# Patient Record
Sex: FEMALE | Race: WHITE | Employment: UNEMPLOYED | URBAN - METROPOLITAN AREA
[De-identification: names, ages, dates, MRNs, and addresses within clinical notes are randomized per-mention and may not be internally consistent; named-entity substitution may affect disease eponyms.]

---

## 2017-01-03 ENCOUNTER — TELEPHONE (OUTPATIENT)
Dept: FAMILY MEDICINE CLINIC | Age: 67
End: 2017-01-03

## 2017-01-13 ENCOUNTER — OFFICE VISIT (OUTPATIENT)
Dept: FAMILY MEDICINE CLINIC | Age: 67
End: 2017-01-13

## 2017-01-13 VITALS
HEART RATE: 68 BPM | HEIGHT: 63 IN | WEIGHT: 214 LBS | SYSTOLIC BLOOD PRESSURE: 166 MMHG | DIASTOLIC BLOOD PRESSURE: 90 MMHG | BODY MASS INDEX: 37.92 KG/M2

## 2017-01-13 DIAGNOSIS — G30.9 ALZHEIMER'S DEMENTIA WITHOUT BEHAVIORAL DISTURBANCE, UNSPECIFIED TIMING OF DEMENTIA ONSET: ICD-10-CM

## 2017-01-13 DIAGNOSIS — E11.40 TYPE 2 DIABETES MELLITUS WITH DIABETIC NEUROPATHY, WITH LONG-TERM CURRENT USE OF INSULIN (HCC): ICD-10-CM

## 2017-01-13 DIAGNOSIS — I10 ESSENTIAL HYPERTENSION: ICD-10-CM

## 2017-01-13 DIAGNOSIS — G40.909 SEIZURE DISORDER (HCC): ICD-10-CM

## 2017-01-13 DIAGNOSIS — Z09 HOSPITAL DISCHARGE FOLLOW-UP: Primary | ICD-10-CM

## 2017-01-13 DIAGNOSIS — F32.A DEPRESSION, UNSPECIFIED DEPRESSION TYPE: ICD-10-CM

## 2017-01-13 DIAGNOSIS — F02.80 ALZHEIMER'S DEMENTIA WITHOUT BEHAVIORAL DISTURBANCE, UNSPECIFIED TIMING OF DEMENTIA ONSET: ICD-10-CM

## 2017-01-13 DIAGNOSIS — Z79.4 TYPE 2 DIABETES MELLITUS WITH DIABETIC NEUROPATHY, WITH LONG-TERM CURRENT USE OF INSULIN (HCC): ICD-10-CM

## 2017-01-13 DIAGNOSIS — F41.9 ANXIETY: ICD-10-CM

## 2017-01-13 PROCEDURE — 99213 OFFICE O/P EST LOW 20 MIN: CPT | Performed by: NURSE PRACTITIONER

## 2017-01-13 RX ORDER — LEVETIRACETAM 500 MG/1
500 TABLET ORAL 2 TIMES DAILY
Qty: 180 TABLET | Refills: 1 | Status: SHIPPED | OUTPATIENT
Start: 2017-01-13 | End: 2017-03-31 | Stop reason: SDUPTHER

## 2017-01-13 RX ORDER — LEVETIRACETAM 500 MG/1
500 TABLET ORAL 2 TIMES DAILY
COMMUNITY
End: 2017-01-13 | Stop reason: SDUPTHER

## 2017-01-13 ASSESSMENT — ENCOUNTER SYMPTOMS
BACK PAIN: 0
VOMITING: 0
COUGH: 0
NAUSEA: 0
CHEST TIGHTNESS: 0
DIARRHEA: 0
TROUBLE SWALLOWING: 0
PHOTOPHOBIA: 0
WHEEZING: 0
SINUS PRESSURE: 0
SHORTNESS OF BREATH: 0

## 2017-01-27 RX ORDER — LEVETIRACETAM 500 MG/1
500 TABLET ORAL 2 TIMES DAILY
Qty: 20 TABLET | Refills: 0 | Status: SHIPPED | OUTPATIENT
Start: 2017-01-27 | End: 2017-11-16

## 2017-02-06 ENCOUNTER — TELEPHONE (OUTPATIENT)
Dept: FAMILY MEDICINE CLINIC | Age: 67
End: 2017-02-06

## 2017-03-31 ENCOUNTER — OFFICE VISIT (OUTPATIENT)
Dept: FAMILY MEDICINE CLINIC | Age: 67
End: 2017-03-31

## 2017-03-31 VITALS
OXYGEN SATURATION: 96 % | HEIGHT: 63 IN | BODY MASS INDEX: 34.02 KG/M2 | SYSTOLIC BLOOD PRESSURE: 150 MMHG | WEIGHT: 192 LBS | DIASTOLIC BLOOD PRESSURE: 90 MMHG | HEART RATE: 80 BPM

## 2017-03-31 DIAGNOSIS — I10 ESSENTIAL HYPERTENSION: ICD-10-CM

## 2017-03-31 DIAGNOSIS — E11.40 TYPE 2 DIABETES MELLITUS WITH DIABETIC NEUROPATHY, WITH LONG-TERM CURRENT USE OF INSULIN (HCC): Primary | ICD-10-CM

## 2017-03-31 DIAGNOSIS — F41.9 ANXIETY: ICD-10-CM

## 2017-03-31 DIAGNOSIS — M79.7 FIBROMYALGIA: ICD-10-CM

## 2017-03-31 DIAGNOSIS — F03.90 DEMENTIA WITHOUT BEHAVIORAL DISTURBANCE, UNSPECIFIED DEMENTIA TYPE: ICD-10-CM

## 2017-03-31 DIAGNOSIS — G43.009 NONINTRACTABLE MIGRAINE, UNSPECIFIED MIGRAINE TYPE: ICD-10-CM

## 2017-03-31 DIAGNOSIS — G25.81 RESTLESS LEGS SYNDROME: ICD-10-CM

## 2017-03-31 DIAGNOSIS — G40.909 SEIZURE DISORDER (HCC): ICD-10-CM

## 2017-03-31 DIAGNOSIS — Z79.4 TYPE 2 DIABETES MELLITUS WITH DIABETIC NEUROPATHY, WITH LONG-TERM CURRENT USE OF INSULIN (HCC): Primary | ICD-10-CM

## 2017-03-31 LAB
A/G RATIO: 1.9 (ref 1.1–2.2)
ALBUMIN SERPL-MCNC: 3.9 G/DL (ref 3.4–5)
ALP BLD-CCNC: 89 U/L (ref 40–129)
ALT SERPL-CCNC: 15 U/L (ref 10–40)
ANION GAP SERPL CALCULATED.3IONS-SCNC: 16 MMOL/L (ref 3–16)
AST SERPL-CCNC: 17 U/L (ref 15–37)
BILIRUB SERPL-MCNC: 0.5 MG/DL (ref 0–1)
BUN BLDV-MCNC: 11 MG/DL (ref 7–20)
CALCIUM SERPL-MCNC: 9.5 MG/DL (ref 8.3–10.6)
CHLORIDE BLD-SCNC: 95 MMOL/L (ref 99–110)
CO2: 28 MMOL/L (ref 21–32)
CREAT SERPL-MCNC: 0.8 MG/DL (ref 0.6–1.2)
GFR AFRICAN AMERICAN: >60
GFR NON-AFRICAN AMERICAN: >60
GLOBULIN: 2.1 G/DL
GLUCOSE BLD-MCNC: 361 MG/DL (ref 70–99)
POTASSIUM SERPL-SCNC: 4.5 MMOL/L (ref 3.5–5.1)
SODIUM BLD-SCNC: 139 MMOL/L (ref 136–145)
TOTAL PROTEIN: 6 G/DL (ref 6.4–8.2)

## 2017-03-31 PROCEDURE — 99214 OFFICE O/P EST MOD 30 MIN: CPT | Performed by: NURSE PRACTITIONER

## 2017-03-31 PROCEDURE — 36415 COLL VENOUS BLD VENIPUNCTURE: CPT | Performed by: NURSE PRACTITIONER

## 2017-03-31 RX ORDER — IRBESARTAN 300 MG/1
300 TABLET ORAL DAILY
Qty: 90 TABLET | Refills: 1 | Status: SHIPPED | OUTPATIENT
Start: 2017-03-31 | End: 2017-11-16 | Stop reason: SDUPTHER

## 2017-03-31 RX ORDER — SIMVASTATIN 40 MG
40 TABLET ORAL NIGHTLY
Qty: 90 TABLET | Refills: 1 | Status: SHIPPED | OUTPATIENT
Start: 2017-03-31 | End: 2017-11-16

## 2017-03-31 RX ORDER — HYDROCHLOROTHIAZIDE 12.5 MG/1
12.5 CAPSULE, GELATIN COATED ORAL DAILY
Qty: 90 CAPSULE | Refills: 1 | Status: SHIPPED | OUTPATIENT
Start: 2017-03-31 | End: 2017-11-16 | Stop reason: SDUPTHER

## 2017-03-31 RX ORDER — DONEPEZIL HYDROCHLORIDE 10 MG/1
10 TABLET, FILM COATED ORAL NIGHTLY
Qty: 90 TABLET | Refills: 1 | Status: SHIPPED | OUTPATIENT
Start: 2017-03-31 | End: 2017-11-16 | Stop reason: SDUPTHER

## 2017-03-31 RX ORDER — LEVETIRACETAM 500 MG/1
500 TABLET ORAL 2 TIMES DAILY
Qty: 180 TABLET | Refills: 1 | Status: SHIPPED | OUTPATIENT
Start: 2017-03-31 | End: 2017-11-16 | Stop reason: SDUPTHER

## 2017-03-31 RX ORDER — MEMANTINE HYDROCHLORIDE 28 MG/1
28 CAPSULE, EXTENDED RELEASE ORAL DAILY
Qty: 90 CAPSULE | Refills: 1 | Status: SHIPPED | OUTPATIENT
Start: 2017-03-31 | End: 2017-11-16 | Stop reason: SDUPTHER

## 2017-03-31 RX ORDER — METFORMIN HYDROCHLORIDE 500 MG/1
1000 TABLET, EXTENDED RELEASE ORAL
Qty: 180 TABLET | Refills: 1 | Status: SHIPPED | OUTPATIENT
Start: 2017-03-31 | End: 2017-11-16 | Stop reason: SDUPTHER

## 2017-03-31 RX ORDER — DILTIAZEM HYDROCHLORIDE 240 MG/1
CAPSULE, EXTENDED RELEASE ORAL
Qty: 90 CAPSULE | Refills: 1 | Status: SHIPPED | OUTPATIENT
Start: 2017-03-31 | End: 2017-11-16 | Stop reason: SDUPTHER

## 2017-03-31 RX ORDER — OMEPRAZOLE 40 MG/1
40 CAPSULE, DELAYED RELEASE ORAL DAILY
Qty: 90 CAPSULE | Refills: 1 | Status: SHIPPED | OUTPATIENT
Start: 2017-03-31 | End: 2017-11-16 | Stop reason: SDUPTHER

## 2017-03-31 RX ORDER — ATENOLOL 100 MG/1
TABLET ORAL
Qty: 90 TABLET | Refills: 1 | Status: SHIPPED | OUTPATIENT
Start: 2017-03-31 | End: 2017-11-16 | Stop reason: SDUPTHER

## 2017-03-31 RX ORDER — ROPINIROLE 3 MG/1
3 TABLET, FILM COATED ORAL 3 TIMES DAILY
Qty: 270 TABLET | Refills: 1 | Status: SHIPPED | OUTPATIENT
Start: 2017-03-31 | End: 2017-11-16 | Stop reason: SDUPTHER

## 2017-03-31 ASSESSMENT — ENCOUNTER SYMPTOMS
VOMITING: 0
NAUSEA: 1
COUGH: 0
CHEST TIGHTNESS: 0
DIARRHEA: 0
SHORTNESS OF BREATH: 0
PHOTOPHOBIA: 0
ABDOMINAL PAIN: 0
WHEEZING: 0

## 2017-04-01 LAB
ESTIMATED AVERAGE GLUCOSE: 274.7 MG/DL
HBA1C MFR BLD: 11.2 %

## 2017-04-03 ENCOUNTER — TELEPHONE (OUTPATIENT)
Dept: FAMILY MEDICINE CLINIC | Age: 67
End: 2017-04-03

## 2017-05-09 DIAGNOSIS — G40.909 SEIZURE DISORDER (HCC): Primary | ICD-10-CM

## 2017-10-18 ENCOUNTER — TELEPHONE (OUTPATIENT)
Dept: FAMILY MEDICINE CLINIC | Age: 67
End: 2017-10-18

## 2017-10-18 DIAGNOSIS — Z12.31 ENCOUNTER FOR SCREENING MAMMOGRAM FOR BREAST CANCER: Primary | ICD-10-CM

## 2017-11-16 ENCOUNTER — OFFICE VISIT (OUTPATIENT)
Dept: FAMILY MEDICINE CLINIC | Age: 67
End: 2017-11-16

## 2017-11-16 VITALS
OXYGEN SATURATION: 96 % | TEMPERATURE: 97.9 F | HEART RATE: 86 BPM | HEIGHT: 63 IN | SYSTOLIC BLOOD PRESSURE: 162 MMHG | WEIGHT: 192 LBS | BODY MASS INDEX: 34.02 KG/M2 | DIASTOLIC BLOOD PRESSURE: 80 MMHG

## 2017-11-16 DIAGNOSIS — M79.7 FIBROMYALGIA: ICD-10-CM

## 2017-11-16 DIAGNOSIS — E11.40 TYPE 2 DIABETES MELLITUS WITH DIABETIC NEUROPATHY, WITH LONG-TERM CURRENT USE OF INSULIN (HCC): ICD-10-CM

## 2017-11-16 DIAGNOSIS — I10 ESSENTIAL HYPERTENSION: ICD-10-CM

## 2017-11-16 DIAGNOSIS — Z12.31 ENCOUNTER FOR SCREENING MAMMOGRAM FOR BREAST CANCER: ICD-10-CM

## 2017-11-16 DIAGNOSIS — G40.909 SEIZURE DISORDER (HCC): ICD-10-CM

## 2017-11-16 DIAGNOSIS — Z23 NEED FOR PROPHYLACTIC VACCINATION AGAINST STREPTOCOCCUS PNEUMONIAE (PNEUMOCOCCUS): ICD-10-CM

## 2017-11-16 DIAGNOSIS — E53.8 VITAMIN B12 DEFICIENCY: ICD-10-CM

## 2017-11-16 DIAGNOSIS — F41.9 ANXIETY: ICD-10-CM

## 2017-11-16 DIAGNOSIS — G25.81 RESTLESS LEGS SYNDROME: ICD-10-CM

## 2017-11-16 DIAGNOSIS — Z72.89 OTHER PROBLEMS RELATED TO LIFESTYLE: ICD-10-CM

## 2017-11-16 DIAGNOSIS — Z91.81 AT HIGH RISK FOR FALLS: ICD-10-CM

## 2017-11-16 DIAGNOSIS — E78.2 MIXED HYPERLIPIDEMIA: ICD-10-CM

## 2017-11-16 DIAGNOSIS — Z23 NEED FOR PROPHYLACTIC VACCINATION WITH TETANUS-DIPHTHERIA (TD): ICD-10-CM

## 2017-11-16 DIAGNOSIS — Z79.4 TYPE 2 DIABETES MELLITUS WITH DIABETIC NEUROPATHY, WITH LONG-TERM CURRENT USE OF INSULIN (HCC): ICD-10-CM

## 2017-11-16 DIAGNOSIS — Z78.0 POST-MENOPAUSAL: ICD-10-CM

## 2017-11-16 DIAGNOSIS — F03.90 DEMENTIA WITHOUT BEHAVIORAL DISTURBANCE, UNSPECIFIED DEMENTIA TYPE: ICD-10-CM

## 2017-11-16 DIAGNOSIS — G43.009 NONINTRACTABLE MIGRAINE, UNSPECIFIED MIGRAINE TYPE: ICD-10-CM

## 2017-11-16 LAB
A/G RATIO: 2 (ref 1.1–2.2)
ALBUMIN SERPL-MCNC: 4.2 G/DL (ref 3.4–5)
ALP BLD-CCNC: 100 U/L (ref 40–129)
ALT SERPL-CCNC: 16 U/L (ref 10–40)
ANION GAP SERPL CALCULATED.3IONS-SCNC: 14 MMOL/L (ref 3–16)
AST SERPL-CCNC: 13 U/L (ref 15–37)
BILIRUB SERPL-MCNC: 0.3 MG/DL (ref 0–1)
BUN BLDV-MCNC: 20 MG/DL (ref 7–20)
CALCIUM SERPL-MCNC: 9.9 MG/DL (ref 8.3–10.6)
CHLORIDE BLD-SCNC: 94 MMOL/L (ref 99–110)
CHOLESTEROL, FASTING: 302 MG/DL (ref 0–199)
CO2: 30 MMOL/L (ref 21–32)
CREAT SERPL-MCNC: 0.9 MG/DL (ref 0.6–1.2)
CREATININE URINE POCT: ABNORMAL
GFR AFRICAN AMERICAN: >60
GFR NON-AFRICAN AMERICAN: >60
GLOBULIN: 2.1 G/DL
GLUCOSE BLD-MCNC: 395 MG/DL (ref 70–99)
HDLC SERPL-MCNC: 61 MG/DL (ref 40–60)
HEPATITIS C ANTIBODY INTERPRETATION: NORMAL
LDL CHOLESTEROL CALCULATED: ABNORMAL MG/DL
LDL CHOLESTEROL DIRECT: 181 MG/DL
MICROALBUMIN/CREAT 24H UR: 100 MG/G{CREAT}
MICROALBUMIN/CREAT UR-RTO: ABNORMAL
POTASSIUM SERPL-SCNC: 5.1 MMOL/L (ref 3.5–5.1)
SODIUM BLD-SCNC: 138 MMOL/L (ref 136–145)
TOTAL PROTEIN: 6.3 G/DL (ref 6.4–8.2)
TRIGLYCERIDE, FASTING: 683 MG/DL (ref 0–150)
VITAMIN B-12: >2000 PG/ML (ref 211–911)
VLDLC SERPL CALC-MCNC: ABNORMAL MG/DL

## 2017-11-16 PROCEDURE — G0008 ADMIN INFLUENZA VIRUS VAC: HCPCS | Performed by: NURSE PRACTITIONER

## 2017-11-16 PROCEDURE — G8427 DOCREV CUR MEDS BY ELIG CLIN: HCPCS | Performed by: NURSE PRACTITIONER

## 2017-11-16 PROCEDURE — 1123F ACP DISCUSS/DSCN MKR DOCD: CPT | Performed by: NURSE PRACTITIONER

## 2017-11-16 PROCEDURE — G8417 CALC BMI ABV UP PARAM F/U: HCPCS | Performed by: NURSE PRACTITIONER

## 2017-11-16 PROCEDURE — 99214 OFFICE O/P EST MOD 30 MIN: CPT | Performed by: NURSE PRACTITIONER

## 2017-11-16 PROCEDURE — 3046F HEMOGLOBIN A1C LEVEL >9.0%: CPT | Performed by: NURSE PRACTITIONER

## 2017-11-16 PROCEDURE — 36415 COLL VENOUS BLD VENIPUNCTURE: CPT | Performed by: NURSE PRACTITIONER

## 2017-11-16 PROCEDURE — 3014F SCREEN MAMMO DOC REV: CPT | Performed by: NURSE PRACTITIONER

## 2017-11-16 PROCEDURE — 96372 THER/PROPH/DIAG INJ SC/IM: CPT | Performed by: NURSE PRACTITIONER

## 2017-11-16 PROCEDURE — 1090F PRES/ABSN URINE INCON ASSESS: CPT | Performed by: NURSE PRACTITIONER

## 2017-11-16 PROCEDURE — 4040F PNEUMOC VAC/ADMIN/RCVD: CPT | Performed by: NURSE PRACTITIONER

## 2017-11-16 PROCEDURE — 3017F COLORECTAL CA SCREEN DOC REV: CPT | Performed by: NURSE PRACTITIONER

## 2017-11-16 PROCEDURE — 1036F TOBACCO NON-USER: CPT | Performed by: NURSE PRACTITIONER

## 2017-11-16 PROCEDURE — 90662 IIV NO PRSV INCREASED AG IM: CPT | Performed by: NURSE PRACTITIONER

## 2017-11-16 PROCEDURE — G8484 FLU IMMUNIZE NO ADMIN: HCPCS | Performed by: NURSE PRACTITIONER

## 2017-11-16 PROCEDURE — 82044 UR ALBUMIN SEMIQUANTITATIVE: CPT | Performed by: NURSE PRACTITIONER

## 2017-11-16 PROCEDURE — G8400 PT W/DXA NO RESULTS DOC: HCPCS | Performed by: NURSE PRACTITIONER

## 2017-11-16 RX ORDER — DONEPEZIL HYDROCHLORIDE 10 MG/1
10 TABLET, FILM COATED ORAL NIGHTLY
Qty: 90 TABLET | Refills: 1 | Status: SHIPPED | OUTPATIENT
Start: 2017-11-16 | End: 2017-12-14 | Stop reason: SDUPTHER

## 2017-11-16 RX ORDER — BUDESONIDE AND FORMOTEROL FUMARATE DIHYDRATE 160; 4.5 UG/1; UG/1
2 AEROSOL RESPIRATORY (INHALATION) 2 TIMES DAILY
Qty: 3 INHALER | Refills: 1 | Status: SHIPPED | OUTPATIENT
Start: 2017-11-16 | End: 2017-12-14 | Stop reason: SDUPTHER

## 2017-11-16 RX ORDER — IRBESARTAN 300 MG/1
300 TABLET ORAL DAILY
Qty: 90 TABLET | Refills: 1 | Status: SHIPPED | OUTPATIENT
Start: 2017-11-16 | End: 2017-12-14 | Stop reason: SDUPTHER

## 2017-11-16 RX ORDER — ROPINIROLE 3 MG/1
3 TABLET, FILM COATED ORAL 3 TIMES DAILY
Qty: 270 TABLET | Refills: 1 | Status: SHIPPED | OUTPATIENT
Start: 2017-11-16 | End: 2017-12-14 | Stop reason: SDUPTHER

## 2017-11-16 RX ORDER — HYDROCHLOROTHIAZIDE 12.5 MG/1
12.5 CAPSULE, GELATIN COATED ORAL DAILY
Qty: 90 CAPSULE | Refills: 1 | Status: SHIPPED | OUTPATIENT
Start: 2017-11-16 | End: 2017-12-14 | Stop reason: SDUPTHER

## 2017-11-16 RX ORDER — OMEPRAZOLE 40 MG/1
40 CAPSULE, DELAYED RELEASE ORAL DAILY
Qty: 90 CAPSULE | Refills: 1 | Status: SHIPPED | OUTPATIENT
Start: 2017-11-16 | End: 2017-12-14 | Stop reason: SDUPTHER

## 2017-11-16 RX ORDER — LEVETIRACETAM 500 MG/1
500 TABLET ORAL 2 TIMES DAILY
Qty: 180 TABLET | Refills: 1 | Status: SHIPPED | OUTPATIENT
Start: 2017-11-16 | End: 2017-12-14 | Stop reason: SDUPTHER

## 2017-11-16 RX ORDER — TETANUS AND DIPHTHERIA TOXOIDS ADSORBED 2; 2 [LF]/.5ML; [LF]/.5ML
0.5 INJECTION INTRAMUSCULAR ONCE
Qty: 0.5 ML | Refills: 0 | Status: SHIPPED | OUTPATIENT
Start: 2017-11-16 | End: 2017-11-16

## 2017-11-16 RX ORDER — ATENOLOL 100 MG/1
TABLET ORAL
Qty: 90 TABLET | Refills: 1 | Status: SHIPPED | OUTPATIENT
Start: 2017-11-16 | End: 2017-12-14 | Stop reason: SDUPTHER

## 2017-11-16 RX ORDER — METFORMIN HYDROCHLORIDE 500 MG/1
1000 TABLET, EXTENDED RELEASE ORAL
Qty: 180 TABLET | Refills: 1 | Status: SHIPPED | OUTPATIENT
Start: 2017-11-16 | End: 2017-12-14 | Stop reason: SDUPTHER

## 2017-11-16 RX ORDER — CYANOCOBALAMIN 1000 UG/ML
1000 INJECTION INTRAMUSCULAR; SUBCUTANEOUS ONCE
Status: COMPLETED | OUTPATIENT
Start: 2017-11-16 | End: 2017-11-16

## 2017-11-16 RX ORDER — ALBUTEROL SULFATE 90 UG/1
2 AEROSOL, METERED RESPIRATORY (INHALATION) 3 TIMES DAILY PRN
Qty: 3 INHALER | Refills: 1 | Status: SHIPPED | OUTPATIENT
Start: 2017-11-16 | End: 2017-12-14 | Stop reason: SDUPTHER

## 2017-11-16 RX ORDER — DILTIAZEM HYDROCHLORIDE 240 MG/1
CAPSULE, EXTENDED RELEASE ORAL
Qty: 90 CAPSULE | Refills: 1 | Status: SHIPPED | OUTPATIENT
Start: 2017-11-16 | End: 2017-12-14 | Stop reason: SDUPTHER

## 2017-11-16 RX ORDER — ATORVASTATIN CALCIUM 20 MG/1
20 TABLET, FILM COATED ORAL DAILY
Qty: 90 TABLET | Refills: 1 | Status: SHIPPED | OUTPATIENT
Start: 2017-11-16 | End: 2017-12-14 | Stop reason: SDUPTHER

## 2017-11-16 RX ORDER — GABAPENTIN 400 MG/1
400 CAPSULE ORAL 3 TIMES DAILY
Qty: 270 CAPSULE | Refills: 1 | Status: SHIPPED | OUTPATIENT
Start: 2017-11-16 | End: 2017-12-14 | Stop reason: SDUPTHER

## 2017-11-16 RX ORDER — MEMANTINE HYDROCHLORIDE 28 MG/1
28 CAPSULE, EXTENDED RELEASE ORAL DAILY
Qty: 90 CAPSULE | Refills: 1 | Status: SHIPPED | OUTPATIENT
Start: 2017-11-16 | End: 2017-12-14 | Stop reason: SDUPTHER

## 2017-11-16 RX ADMIN — CYANOCOBALAMIN 1000 MCG: 1000 INJECTION INTRAMUSCULAR; SUBCUTANEOUS at 18:16

## 2017-11-16 ASSESSMENT — PATIENT HEALTH QUESTIONNAIRE - PHQ9
2. FEELING DOWN, DEPRESSED OR HOPELESS: 0
1. LITTLE INTEREST OR PLEASURE IN DOING THINGS: 0
SUM OF ALL RESPONSES TO PHQ QUESTIONS 1-9: 0
SUM OF ALL RESPONSES TO PHQ9 QUESTIONS 1 & 2: 0

## 2017-11-16 NOTE — PROGRESS NOTES
Patient seen for follow up of HTN, DM, HLD, dementia and seizure disorder. She is accompanied by her elderly , with whom she lives and who is her primary caretaker. I spoke with her daughter by phone who confirmed the medications that she is supposed to be taking, but reported that her mother often refuses to take the medications, including her insulin. Her  confirms this, and the patient states that she \"forgets\" to take them. We have discussed that some of the medications are to help her \"remember\" better. She is complaining of bilateral hand joint pain and worsening memory. She sleeps well. Her appetite is good. She refuses to check her finger sticks. Her daughter reports that they get her a new glucose monitor and she loses it. No other complaints. 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 Head is normocephalic and atraumatic. Eyes: PERRLA  Neck: supple; trachea midline; no bruits. Lungs: CTA  Heart: RRR, no MRG  Abdomen: soft and nontender  Extremities: no edema  Diabetic visual inspection shows no foot ulcers. Sensory exam intact to microfilament is intact. Peripheral pulses are 2+ and equal.  Deformities: No  Rashes:  No  Callous:  No  Edema:  No  Injury:  No  Nails: abnormal>>bilateral onychomycosis      Assessment:    1.  Uncontrolled type 2 diabetes mellitus with complication, with long-term current use of insulin ([address] release tablet    gabapentin (NEURONTIN) 400 MG capsule    budesonide-formoterol (SYMBICORT) 160-4.5 MCG/ACT AERO    albuterol sulfate HFA (PROVENTIL HFA) 108 (90 Base) MCG/ACT inhaler   10. Restless legs syndrome  rOPINIRole (REQUIP) 3 MG tablet    atenolol (TENORMIN) 100 MG tablet    omeprazole (PRILOSEC) 40 MG delayed release capsule    metFORMIN (GLUCOPHAGE-XR) 500 MG extended release tablet    gabapentin (NEURONTIN) 400 MG capsule    budesonide-formoterol (SYMBICORT) 160-4.5 MCG/ACT AERO    albuterol sulfate HFA (PROVENTIL HFA) 108 (90 Base) MCG/ACT inhaler   11. Nonintractable migraine, unspecified migraine type  atenolol (TENORMIN) 100 MG tablet    omeprazole (PRILOSEC) 40 MG delayed release capsule    metFORMIN (GLUCOPHAGE-XR) 500 MG extended release tablet    gabapentin (NEURONTIN) 400 MG capsule    budesonide-formoterol (SYMBICORT) 160-4.5 MCG/ACT AERO    albuterol sulfate HFA (PROVENTIL HFA) 108 (90 Base) MCG/ACT inhaler   12. Dementia without behavioral disturbance, unspecified dementia type  donepezil (ARICEPT) 10 MG tablet    memantine ER (NAMENDA XR) 28 MG CP24 extended release capsule   13. Other problems related to lifestyle  Hepatitis C Antibody   14. Encounter for screening mammogram for breast cancer  SHAMA Digital Screen Bilateral [[id number]]   15. Seizure disorder (HCC)  levETIRAcetam (KEPPRA) 500 MG tablet   16. Need for prophylactic vaccination against Streptococcus pneumoniae (pneumococcus)     17. Need for prophylactic vaccination with tetanus-diphtheria (TD)  diptheria-tetanus toxoids [facility]) 2-2 LF/0.5ML injection       Discussed need for taking medications on regular basis. I doubt this has much impact given patient's mental status. Labs today. B12 given due to being extremely low in past.  This may help with balance, memory, energy, etc.      Plan:    Discussed use, benefit, and side effects of prescribed medications. Barriers to medication compliance addressed.   All patient questions answered. Pt voiced understanding. On the basis of positive falls risk screening, assessment and plan is as follows: patient will follow up in 3 month(s) for further evaluation. Labs today. Continue with cane for assistance with ambulation. B12 injection administered.

## 2017-11-16 NOTE — PROGRESS NOTES
Vaccine Information Sheet, \"Influenza - Inactivated\"  given to Jono Laguerre, or parent/legal guardian of  Jono Laguerre and verbalized understanding. Patient responses:    Have you ever had a reaction to a flu vaccine? No  Are you able to eat eggs without adverse effects? Yes and No  Do you have any current illness? No  Have you ever had Guillian Anna Syndrome? No    Flu vaccine given per order. Please see immunization tab. With patients permission, influenza 0.5  injection was given IM in left deltoid in a standard sterile fashion. The patient tolerated the procedure well with out difficulty.

## 2017-11-17 LAB
ESTIMATED AVERAGE GLUCOSE: 271.9 MG/DL
HBA1C MFR BLD: 11.1 %

## 2017-12-06 DIAGNOSIS — R92.8 ABNORMAL MAMMOGRAM OF LEFT BREAST: Primary | ICD-10-CM

## 2017-12-14 DIAGNOSIS — G40.909 SEIZURE DISORDER (HCC): ICD-10-CM

## 2017-12-14 DIAGNOSIS — M79.7 FIBROMYALGIA: ICD-10-CM

## 2017-12-14 DIAGNOSIS — F03.90 DEMENTIA WITHOUT BEHAVIORAL DISTURBANCE, UNSPECIFIED DEMENTIA TYPE: ICD-10-CM

## 2017-12-14 DIAGNOSIS — G25.81 RESTLESS LEGS SYNDROME: ICD-10-CM

## 2017-12-14 DIAGNOSIS — F41.9 ANXIETY: ICD-10-CM

## 2017-12-14 DIAGNOSIS — Z79.4 TYPE 2 DIABETES MELLITUS WITH DIABETIC NEUROPATHY, WITH LONG-TERM CURRENT USE OF INSULIN (HCC): ICD-10-CM

## 2017-12-14 DIAGNOSIS — I10 ESSENTIAL HYPERTENSION: ICD-10-CM

## 2017-12-14 DIAGNOSIS — G43.009 NONINTRACTABLE MIGRAINE, UNSPECIFIED MIGRAINE TYPE: ICD-10-CM

## 2017-12-14 DIAGNOSIS — E11.40 TYPE 2 DIABETES MELLITUS WITH DIABETIC NEUROPATHY, WITH LONG-TERM CURRENT USE OF INSULIN (HCC): ICD-10-CM

## 2017-12-14 DIAGNOSIS — E78.2 MIXED HYPERLIPIDEMIA: ICD-10-CM

## 2017-12-14 RX ORDER — ATENOLOL 100 MG/1
TABLET ORAL
Qty: 90 TABLET | Refills: 1 | Status: SHIPPED | OUTPATIENT
Start: 2017-12-14

## 2017-12-14 RX ORDER — ROPINIROLE 3 MG/1
3 TABLET, FILM COATED ORAL 3 TIMES DAILY
Qty: 270 TABLET | Refills: 1 | Status: SHIPPED | OUTPATIENT
Start: 2017-12-14

## 2017-12-14 RX ORDER — ATORVASTATIN CALCIUM 20 MG/1
20 TABLET, FILM COATED ORAL DAILY
Qty: 90 TABLET | Refills: 1 | Status: SHIPPED | OUTPATIENT
Start: 2017-12-14

## 2017-12-14 RX ORDER — IRBESARTAN 300 MG/1
300 TABLET ORAL DAILY
Qty: 90 TABLET | Refills: 1 | Status: SHIPPED | OUTPATIENT
Start: 2017-12-14

## 2017-12-14 RX ORDER — DONEPEZIL HYDROCHLORIDE 10 MG/1
10 TABLET, FILM COATED ORAL NIGHTLY
Qty: 90 TABLET | Refills: 1 | Status: SHIPPED | OUTPATIENT
Start: 2017-12-14 | End: 2018-03-08 | Stop reason: SDUPTHER

## 2017-12-14 RX ORDER — HYDROCHLOROTHIAZIDE 12.5 MG/1
12.5 CAPSULE, GELATIN COATED ORAL DAILY
Qty: 90 CAPSULE | Refills: 1 | Status: SHIPPED | OUTPATIENT
Start: 2017-12-14 | End: 2018-03-08 | Stop reason: SDUPTHER

## 2017-12-14 RX ORDER — ALBUTEROL SULFATE 90 UG/1
2 AEROSOL, METERED RESPIRATORY (INHALATION) 3 TIMES DAILY PRN
Qty: 3 INHALER | Refills: 1 | Status: SHIPPED | OUTPATIENT
Start: 2017-12-14

## 2017-12-14 RX ORDER — DILTIAZEM HYDROCHLORIDE 240 MG/1
CAPSULE, EXTENDED RELEASE ORAL
Qty: 90 CAPSULE | Refills: 1 | Status: SHIPPED | OUTPATIENT
Start: 2017-12-14 | End: 2018-03-08 | Stop reason: SDUPTHER

## 2017-12-14 RX ORDER — METFORMIN HYDROCHLORIDE 500 MG/1
1000 TABLET, EXTENDED RELEASE ORAL
Qty: 180 TABLET | Refills: 1 | Status: SHIPPED | OUTPATIENT
Start: 2017-12-14

## 2017-12-14 RX ORDER — LEVETIRACETAM 500 MG/1
500 TABLET ORAL 2 TIMES DAILY
Qty: 180 TABLET | Refills: 1 | Status: SHIPPED | OUTPATIENT
Start: 2017-12-14 | End: 2018-03-08 | Stop reason: SDUPTHER

## 2017-12-14 RX ORDER — MEMANTINE HYDROCHLORIDE 28 MG/1
28 CAPSULE, EXTENDED RELEASE ORAL DAILY
Qty: 90 CAPSULE | Refills: 1 | Status: SHIPPED | OUTPATIENT
Start: 2017-12-14 | End: 2018-03-08 | Stop reason: SDUPTHER

## 2017-12-14 RX ORDER — BUDESONIDE AND FORMOTEROL FUMARATE DIHYDRATE 160; 4.5 UG/1; UG/1
2 AEROSOL RESPIRATORY (INHALATION) 2 TIMES DAILY
Qty: 3 INHALER | Refills: 1 | Status: SHIPPED | OUTPATIENT
Start: 2017-12-14

## 2017-12-14 RX ORDER — OMEPRAZOLE 40 MG/1
40 CAPSULE, DELAYED RELEASE ORAL DAILY
Qty: 90 CAPSULE | Refills: 1 | Status: SHIPPED | OUTPATIENT
Start: 2017-12-14

## 2017-12-14 RX ORDER — GABAPENTIN 400 MG/1
400 CAPSULE ORAL 3 TIMES DAILY
Qty: 270 CAPSULE | Refills: 1 | Status: SHIPPED | OUTPATIENT
Start: 2017-12-14 | End: 2018-03-08 | Stop reason: ALTCHOICE

## 2017-12-21 DIAGNOSIS — R92.8 ABNORMAL MAMMOGRAM OF LEFT BREAST: Primary | ICD-10-CM

## 2018-03-08 ENCOUNTER — OFFICE VISIT (OUTPATIENT)
Dept: FAMILY MEDICINE CLINIC | Age: 68
End: 2018-03-08

## 2018-03-08 VITALS
OXYGEN SATURATION: 98 % | DIASTOLIC BLOOD PRESSURE: 80 MMHG | WEIGHT: 182 LBS | SYSTOLIC BLOOD PRESSURE: 170 MMHG | HEIGHT: 60 IN | HEART RATE: 64 BPM | BODY MASS INDEX: 35.73 KG/M2 | TEMPERATURE: 97.9 F

## 2018-03-08 DIAGNOSIS — N30.00 ACUTE CYSTITIS WITHOUT HEMATURIA: ICD-10-CM

## 2018-03-08 DIAGNOSIS — I10 ESSENTIAL HYPERTENSION: ICD-10-CM

## 2018-03-08 DIAGNOSIS — Z79.4 TYPE 2 DIABETES MELLITUS WITH DIABETIC NEUROPATHY, WITH LONG-TERM CURRENT USE OF INSULIN (HCC): ICD-10-CM

## 2018-03-08 DIAGNOSIS — G62.9 NEUROPATHY: ICD-10-CM

## 2018-03-08 DIAGNOSIS — E11.40 TYPE 2 DIABETES MELLITUS WITH DIABETIC NEUROPATHY, WITH LONG-TERM CURRENT USE OF INSULIN (HCC): ICD-10-CM

## 2018-03-08 DIAGNOSIS — G40.909 SEIZURE DISORDER (HCC): ICD-10-CM

## 2018-03-08 DIAGNOSIS — F03.90 DEMENTIA WITHOUT BEHAVIORAL DISTURBANCE, UNSPECIFIED DEMENTIA TYPE: ICD-10-CM

## 2018-03-08 DIAGNOSIS — G25.81 RLS (RESTLESS LEGS SYNDROME): Primary | ICD-10-CM

## 2018-03-08 LAB
BILIRUBIN, POC: NORMAL
BLOOD URINE, POC: NORMAL
CLARITY, POC: NORMAL
COLOR, POC: NORMAL
GLUCOSE URINE, POC: 500
HCT VFR BLD CALC: 42.1 % (ref 36–48)
HEMOGLOBIN: 13.9 G/DL (ref 12–16)
KETONES, POC: NORMAL
LEUKOCYTE EST, POC: NORMAL
MCH RBC QN AUTO: 29.6 PG (ref 26–34)
MCHC RBC AUTO-ENTMCNC: 33.1 G/DL (ref 31–36)
MCV RBC AUTO: 89.5 FL (ref 80–100)
NITRITE, POC: NORMAL
PDW BLD-RTO: 14 % (ref 12.4–15.4)
PH, POC: 7
PLATELET # BLD: 293 K/UL (ref 135–450)
PMV BLD AUTO: 9.4 FL (ref 5–10.5)
PROTEIN, POC: 30
RBC # BLD: 4.7 M/UL (ref 4–5.2)
SPECIFIC GRAVITY, POC: 1.02
UROBILINOGEN, POC: 0.2
WBC # BLD: 9.5 K/UL (ref 4–11)

## 2018-03-08 PROCEDURE — 3046F HEMOGLOBIN A1C LEVEL >9.0%: CPT | Performed by: NURSE PRACTITIONER

## 2018-03-08 PROCEDURE — 4040F PNEUMOC VAC/ADMIN/RCVD: CPT | Performed by: NURSE PRACTITIONER

## 2018-03-08 PROCEDURE — 1036F TOBACCO NON-USER: CPT | Performed by: NURSE PRACTITIONER

## 2018-03-08 PROCEDURE — 99214 OFFICE O/P EST MOD 30 MIN: CPT | Performed by: NURSE PRACTITIONER

## 2018-03-08 PROCEDURE — G8400 PT W/DXA NO RESULTS DOC: HCPCS | Performed by: NURSE PRACTITIONER

## 2018-03-08 PROCEDURE — 1123F ACP DISCUSS/DSCN MKR DOCD: CPT | Performed by: NURSE PRACTITIONER

## 2018-03-08 PROCEDURE — 1090F PRES/ABSN URINE INCON ASSESS: CPT | Performed by: NURSE PRACTITIONER

## 2018-03-08 PROCEDURE — 81002 URINALYSIS NONAUTO W/O SCOPE: CPT | Performed by: NURSE PRACTITIONER

## 2018-03-08 PROCEDURE — G8482 FLU IMMUNIZE ORDER/ADMIN: HCPCS | Performed by: NURSE PRACTITIONER

## 2018-03-08 PROCEDURE — 3014F SCREEN MAMMO DOC REV: CPT | Performed by: NURSE PRACTITIONER

## 2018-03-08 PROCEDURE — 36415 COLL VENOUS BLD VENIPUNCTURE: CPT | Performed by: NURSE PRACTITIONER

## 2018-03-08 PROCEDURE — G8427 DOCREV CUR MEDS BY ELIG CLIN: HCPCS | Performed by: NURSE PRACTITIONER

## 2018-03-08 PROCEDURE — 3017F COLORECTAL CA SCREEN DOC REV: CPT | Performed by: NURSE PRACTITIONER

## 2018-03-08 PROCEDURE — G8417 CALC BMI ABV UP PARAM F/U: HCPCS | Performed by: NURSE PRACTITIONER

## 2018-03-08 RX ORDER — HYDROCHLOROTHIAZIDE 12.5 MG/1
12.5 CAPSULE, GELATIN COATED ORAL DAILY
Qty: 90 CAPSULE | Refills: 1 | Status: SHIPPED | OUTPATIENT
Start: 2018-03-08

## 2018-03-08 RX ORDER — LEVETIRACETAM 500 MG/1
500 TABLET ORAL 2 TIMES DAILY
Qty: 180 TABLET | Refills: 1 | Status: SHIPPED | OUTPATIENT
Start: 2018-03-08

## 2018-03-08 RX ORDER — DILTIAZEM HYDROCHLORIDE 240 MG/1
CAPSULE, EXTENDED RELEASE ORAL
Qty: 90 CAPSULE | Refills: 1 | Status: SHIPPED | OUTPATIENT
Start: 2018-03-08 | End: 2018-03-08

## 2018-03-08 RX ORDER — GLUCOSAMINE HCL/CHONDROITIN SU 500-400 MG
CAPSULE ORAL
Qty: 300 STRIP | Refills: 5 | Status: SHIPPED | OUTPATIENT
Start: 2018-03-08

## 2018-03-08 RX ORDER — DILTIAZEM HYDROCHLORIDE 420 MG/1
CAPSULE, EXTENDED RELEASE ORAL
Qty: 90 CAPSULE | Refills: 1 | Status: SHIPPED | OUTPATIENT
Start: 2018-03-08 | End: 2018-03-13 | Stop reason: SDUPTHER

## 2018-03-08 RX ORDER — GABAPENTIN 400 MG/1
400 CAPSULE ORAL NIGHTLY
Qty: 30 CAPSULE | Refills: 3 | Status: SHIPPED | OUTPATIENT
Start: 2018-03-08 | End: 2018-03-13 | Stop reason: SDUPTHER

## 2018-03-08 RX ORDER — DONEPEZIL HYDROCHLORIDE 10 MG/1
10 TABLET, FILM COATED ORAL NIGHTLY
Qty: 90 TABLET | Refills: 1 | Status: SHIPPED | OUTPATIENT
Start: 2018-03-08

## 2018-03-08 RX ORDER — MEMANTINE HYDROCHLORIDE 28 MG/1
28 CAPSULE, EXTENDED RELEASE ORAL DAILY
Qty: 90 CAPSULE | Refills: 1 | Status: SHIPPED | OUTPATIENT
Start: 2018-03-08

## 2018-03-08 ASSESSMENT — ENCOUNTER SYMPTOMS
DIARRHEA: 0
NAUSEA: 1
COUGH: 0
CHEST TIGHTNESS: 0
ABDOMINAL PAIN: 0
PHOTOPHOBIA: 0
SHORTNESS OF BREATH: 0
WHEEZING: 0
VOMITING: 0

## 2018-03-08 NOTE — PROGRESS NOTES
Subjective:      Patient ID: Tonya Hopper is a 76 y.o. female. HPI  Patient with history of HTN, dementia, DM, RLS, GERD, and COPD is here for follow up of a hospitalization in January for UTI and sepsis. She was treated with IV antibiotics and fluids, and was released 8 days later to an intermediate care facility, where she remained for approximately one month. Her daughter presents with her reporting that she is doing about as usual.  She continues to have memory deficits that are more pronounced on some days than others. Her  is checking her fingersticks 4 times a day and they are ranging between 80 and 380, with the average in the upper 100's to lower 200's. She has not been taking bydureon, because that was not on the medication sheet from the nursing home. She has been taking NPH 20 units twice daily. She has stopped eating cakes, cookies and chips because they are no longer being brought into the house. She is no longer drinking soda because it is no longer being brought into the house. She denies any urinary symptoms. She denies chest pain or shortness of breath. She continues to have some restless leg symptoms, but does think that the requip helps. She also complains of her hands/fingers hurting at times. No other complaints. 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)     Overweight(278.02)     Restless legs syndrome     TIA (transient ischemic attack)     Type II or unspecified type diabetes mellitus without mention of complication, not stated as uncontrolled        Review of Systems   Constitutional: Negative for activity change, chills, diaphoresis, fatigue and fever. Diet has changed due to the foods being brought into the house.

## 2018-03-09 LAB
A/G RATIO: 2.2 (ref 1.1–2.2)
ALBUMIN SERPL-MCNC: 4.4 G/DL (ref 3.4–5)
ALP BLD-CCNC: 97 U/L (ref 40–129)
ALT SERPL-CCNC: 17 U/L (ref 10–40)
ANION GAP SERPL CALCULATED.3IONS-SCNC: 13 MMOL/L (ref 3–16)
AST SERPL-CCNC: 14 U/L (ref 15–37)
BILIRUB SERPL-MCNC: 0.6 MG/DL (ref 0–1)
BUN BLDV-MCNC: 24 MG/DL (ref 7–20)
CALCIUM SERPL-MCNC: 9.5 MG/DL (ref 8.3–10.6)
CHLORIDE BLD-SCNC: 98 MMOL/L (ref 99–110)
CO2: 32 MMOL/L (ref 21–32)
CREAT SERPL-MCNC: 0.8 MG/DL (ref 0.6–1.2)
ESTIMATED AVERAGE GLUCOSE: 263.3 MG/DL
GFR AFRICAN AMERICAN: >60
GFR NON-AFRICAN AMERICAN: >60
GLOBULIN: 2 G/DL
GLUCOSE BLD-MCNC: 237 MG/DL (ref 70–99)
HBA1C MFR BLD: 10.8 %
POTASSIUM SERPL-SCNC: 4.7 MMOL/L (ref 3.5–5.1)
SODIUM BLD-SCNC: 143 MMOL/L (ref 136–145)
TOTAL PROTEIN: 6.4 G/DL (ref 6.4–8.2)

## 2018-03-10 LAB
ORGANISM: ABNORMAL
URINE CULTURE, ROUTINE: ABNORMAL
URINE CULTURE, ROUTINE: ABNORMAL

## 2018-03-12 RX ORDER — CIPROFLOXACIN 500 MG/1
500 TABLET, FILM COATED ORAL 2 TIMES DAILY
Qty: 6 TABLET | Refills: 0 | Status: SHIPPED | OUTPATIENT
Start: 2018-03-12 | End: 2018-03-15

## 2018-03-13 DIAGNOSIS — G62.9 NEUROPATHY: ICD-10-CM

## 2018-03-13 DIAGNOSIS — I10 ESSENTIAL HYPERTENSION: ICD-10-CM

## 2018-03-13 DIAGNOSIS — G25.81 RLS (RESTLESS LEGS SYNDROME): ICD-10-CM

## 2018-03-13 RX ORDER — DILTIAZEM HYDROCHLORIDE 420 MG/1
CAPSULE, EXTENDED RELEASE ORAL
Qty: 90 CAPSULE | Refills: 0 | Status: SHIPPED | OUTPATIENT
Start: 2018-03-13

## 2018-03-13 RX ORDER — GABAPENTIN 400 MG/1
400 CAPSULE ORAL NIGHTLY
Qty: 90 CAPSULE | Refills: 0 | Status: SHIPPED | OUTPATIENT
Start: 2018-03-13 | End: 2018-06-11

## 2018-03-22 ENCOUNTER — TELEPHONE (OUTPATIENT)
Dept: FAMILY MEDICINE CLINIC | Age: 68
End: 2018-03-22

## 2018-03-22 DIAGNOSIS — N30.00 ACUTE CYSTITIS WITHOUT HEMATURIA: Primary | ICD-10-CM

## 2018-03-22 RX ORDER — SULFAMETHOXAZOLE AND TRIMETHOPRIM 800; 160 MG/1; MG/1
1 TABLET ORAL 2 TIMES DAILY
Qty: 14 TABLET | Refills: 0 | Status: SHIPPED | OUTPATIENT
Start: 2018-03-22 | End: 2018-03-29

## 2018-06-11 ENCOUNTER — APPOINTMENT (RX ONLY)
Dept: URBAN - METROPOLITAN AREA CLINIC 116 | Facility: CLINIC | Age: 68
Setting detail: DERMATOLOGY
End: 2018-06-11

## 2018-06-11 DIAGNOSIS — Z41.9 ENCOUNTER FOR PROCEDURE FOR PURPOSES OTHER THAN REMEDYING HEALTH STATE, UNSPECIFIED: ICD-10-CM

## 2018-06-11 PROCEDURE — ? ULTHERAPY

## 2018-06-11 ASSESSMENT — LOCATION SIMPLE DESCRIPTION DERM
LOCATION SIMPLE: SUBMENTAL CHIN
LOCATION SIMPLE: LEFT CHEEK
LOCATION SIMPLE: NECK
LOCATION SIMPLE: RIGHT CHEEK

## 2018-06-11 ASSESSMENT — LOCATION DETAILED DESCRIPTION DERM
LOCATION DETAILED: LEFT LATERAL BUCCAL CHEEK
LOCATION DETAILED: SUBMENTAL CHIN
LOCATION DETAILED: RIGHT CENTRAL LATERAL NECK
LOCATION DETAILED: LEFT CENTRAL LATERAL NECK
LOCATION DETAILED: RIGHT LATERAL BUCCAL CHEEK
LOCATION DETAILED: LEFT SUPERIOR CENTRAL BUCCAL CHEEK
LOCATION DETAILED: RIGHT SUPERIOR MEDIAL BUCCAL CHEEK
LOCATION DETAILED: RIGHT INFERIOR LATERAL MALAR CHEEK
LOCATION DETAILED: LEFT INFERIOR CENTRAL MALAR CHEEK

## 2018-06-11 ASSESSMENT — LOCATION ZONE DERM
LOCATION ZONE: NECK
LOCATION ZONE: FACE

## 2018-06-11 NOTE — PROCEDURE: ULTHERAPY
Detail Level: Zone
Pre-Procedures Photographs: Yes
Treatment Number (Optional): 1
Total Lines (Use Numbers Only, No Special Characters Or $): [address]
Patient Reported Discomfort: 2
Anesthesia Type: 1% lidocaine with epinephrine
Use Distraction Techniques In Note: No
Post-Care Instructions: I reviewed with the patient in detail post-care instructions. Patient should stay away from the sun and wear sun protection until treated areas are fully healed.
Consent: Written consent obtained, risks reviewed including but not limited to crusting, scabbing, blistering, scarring, darker or lighter pigmentary change, and/or incomplete improvement.

## 2018-07-10 ENCOUNTER — OFFICE VISIT (OUTPATIENT)
Dept: PRIMARY CARE CLINIC | Age: 68
End: 2018-07-10

## 2018-07-10 VITALS
HEIGHT: 63 IN | BODY MASS INDEX: 34.02 KG/M2 | SYSTOLIC BLOOD PRESSURE: 166 MMHG | HEART RATE: 90 BPM | WEIGHT: 192 LBS | TEMPERATURE: 98.5 F | OXYGEN SATURATION: 96 % | DIASTOLIC BLOOD PRESSURE: 86 MMHG

## 2018-07-10 DIAGNOSIS — E11.40 TYPE 2 DIABETES MELLITUS WITH DIABETIC NEUROPATHY, WITH LONG-TERM CURRENT USE OF INSULIN (HCC): ICD-10-CM

## 2018-07-10 DIAGNOSIS — I10 ESSENTIAL HYPERTENSION: ICD-10-CM

## 2018-07-10 DIAGNOSIS — Z79.4 TYPE 2 DIABETES MELLITUS WITH DIABETIC NEUROPATHY, WITH LONG-TERM CURRENT USE OF INSULIN (HCC): ICD-10-CM

## 2018-07-10 DIAGNOSIS — R41.0 CONFUSION: Primary | ICD-10-CM

## 2018-07-10 LAB
A/G RATIO: 2.3 (ref 1.1–2.2)
ALBUMIN SERPL-MCNC: 4.3 G/DL (ref 3.4–5)
ALP BLD-CCNC: 90 U/L (ref 40–129)
ALT SERPL-CCNC: 13 U/L (ref 10–40)
ANION GAP SERPL CALCULATED.3IONS-SCNC: 15 MMOL/L (ref 3–16)
AST SERPL-CCNC: 12 U/L (ref 15–37)
BASOPHILS ABSOLUTE: 0.1 K/UL (ref 0–0.2)
BASOPHILS RELATIVE PERCENT: 0.6 %
BILIRUB SERPL-MCNC: 0.3 MG/DL (ref 0–1)
BUN BLDV-MCNC: 18 MG/DL (ref 7–20)
CALCIUM SERPL-MCNC: 9.7 MG/DL (ref 8.3–10.6)
CHLORIDE BLD-SCNC: 96 MMOL/L (ref 99–110)
CO2: 31 MMOL/L (ref 21–32)
CREAT SERPL-MCNC: 0.8 MG/DL (ref 0.6–1.2)
EOSINOPHILS ABSOLUTE: 0.2 K/UL (ref 0–0.6)
EOSINOPHILS RELATIVE PERCENT: 2.1 %
GFR AFRICAN AMERICAN: >60
GFR NON-AFRICAN AMERICAN: >60
GLOBULIN: 1.9 G/DL
GLUCOSE BLD-MCNC: 282 MG/DL (ref 70–99)
HCT VFR BLD CALC: 43.6 % (ref 36–48)
HEMOGLOBIN: 14.9 G/DL (ref 12–16)
LYMPHOCYTES ABSOLUTE: 2.5 K/UL (ref 1–5.1)
LYMPHOCYTES RELATIVE PERCENT: 21.5 %
MCH RBC QN AUTO: 29.2 PG (ref 26–34)
MCHC RBC AUTO-ENTMCNC: 34.2 G/DL (ref 31–36)
MCV RBC AUTO: 85.3 FL (ref 80–100)
MONOCYTES ABSOLUTE: 0.6 K/UL (ref 0–1.3)
MONOCYTES RELATIVE PERCENT: 5 %
NEUTROPHILS ABSOLUTE: 8.3 K/UL (ref 1.7–7.7)
NEUTROPHILS RELATIVE PERCENT: 70.8 %
PDW BLD-RTO: 12.9 % (ref 12.4–15.4)
PLATELET # BLD: 335 K/UL (ref 135–450)
PMV BLD AUTO: 8.9 FL (ref 5–10.5)
POTASSIUM SERPL-SCNC: 4 MMOL/L (ref 3.5–5.1)
RBC # BLD: 5.11 M/UL (ref 4–5.2)
SODIUM BLD-SCNC: 142 MMOL/L (ref 136–145)
TOTAL PROTEIN: 6.2 G/DL (ref 6.4–8.2)
TSH REFLEX FT4: 2.03 UIU/ML (ref 0.27–4.2)
WBC # BLD: 11.7 K/UL (ref 4–11)

## 2018-07-10 PROCEDURE — G8400 PT W/DXA NO RESULTS DOC: HCPCS | Performed by: NURSE PRACTITIONER

## 2018-07-10 PROCEDURE — 3046F HEMOGLOBIN A1C LEVEL >9.0%: CPT | Performed by: NURSE PRACTITIONER

## 2018-07-10 PROCEDURE — 2022F DILAT RTA XM EVC RTNOPTHY: CPT | Performed by: NURSE PRACTITIONER

## 2018-07-10 PROCEDURE — 3017F COLORECTAL CA SCREEN DOC REV: CPT | Performed by: NURSE PRACTITIONER

## 2018-07-10 PROCEDURE — 3014F SCREEN MAMMO DOC REV: CPT | Performed by: NURSE PRACTITIONER

## 2018-07-10 PROCEDURE — 1036F TOBACCO NON-USER: CPT | Performed by: NURSE PRACTITIONER

## 2018-07-10 PROCEDURE — 4040F PNEUMOC VAC/ADMIN/RCVD: CPT | Performed by: NURSE PRACTITIONER

## 2018-07-10 PROCEDURE — 1123F ACP DISCUSS/DSCN MKR DOCD: CPT | Performed by: NURSE PRACTITIONER

## 2018-07-10 PROCEDURE — 1090F PRES/ABSN URINE INCON ASSESS: CPT | Performed by: NURSE PRACTITIONER

## 2018-07-10 PROCEDURE — G8417 CALC BMI ABV UP PARAM F/U: HCPCS | Performed by: NURSE PRACTITIONER

## 2018-07-10 PROCEDURE — G8427 DOCREV CUR MEDS BY ELIG CLIN: HCPCS | Performed by: NURSE PRACTITIONER

## 2018-07-10 PROCEDURE — 99213 OFFICE O/P EST LOW 20 MIN: CPT | Performed by: NURSE PRACTITIONER

## 2018-07-10 ASSESSMENT — ENCOUNTER SYMPTOMS
EYE REDNESS: 0
BLOOD IN STOOL: 0
COLOR CHANGE: 0
ABDOMINAL PAIN: 0
EYE PAIN: 0
DIARRHEA: 0
VOICE CHANGE: 0
NAUSEA: 0
EYE ITCHING: 0
COUGH: 0
SHORTNESS OF BREATH: 0
CONSTIPATION: 0
WHEEZING: 0
CHEST TIGHTNESS: 0
PHOTOPHOBIA: 0
TROUBLE SWALLOWING: 0
EYE DISCHARGE: 0
VOMITING: 0

## 2018-07-10 NOTE — PROGRESS NOTES
Subjective:      Patient ID: Aleatha Fleischer is a 76 y.o. female. HPI  Patient with history of dementia is here with her family, and they're concerned that her level of confusion has been increased recently as well as a decrease in her appetite. These were the first signs and symptoms of her sepsis a few Months ago. Her  is currently dosing her insulin at 38 units once daily because he did not realize that it was to be twice daily. He will increase it. He reports that the patient frequently REFUSES to take her medications. 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 Negative for activity change, chills, diaphoresis, fatigue and fever. Diet has changed due to the foods being brought into the house. HENT: Negative for congestion, trouble swallowing and voice change. Dental problem: bottom dentures were lost during her inpatient stay. Eyes: Negative for photophobia, pain, discharge, redness, itching and visual disturbance. Eye exam>>5/2016>>reminded of need   Respiratory: Negative for cough, chest tightness, shortness of breath and wheezing. Cardiovascular: Negative for chest pain, palpitations and leg swelling. HTN; HLD   Gastrointestinal: Negative for abdominal pain, blood in stool, constipation, diarrhea, nausea and vomiting.         GERD   Endocrine:        DM   Genitourinary: Negative for decreased urine volume, difficulty urinating, dysuria, flank pain, frequency, hematuria and urgency. Skin: Negative for color change, rash and wound. Neurological: Positive for light-headedness. Negative for dizziness, tremors, seizures (last seizure 1/2017), weakness and headaches. Seizure d/o   Psychiatric/Behavioral: Positive for agitation (reportedly by family) and confusion. Negative for self-injury, sleep disturbance and suicidal ideas. Objective:   Physical Exam   Constitutional: She appears well-developed and well-nourished. No distress. HENT:   Head: Normocephalic and atraumatic. Eyes: Conjunctivae are normal. Pupils are equal, round, and reactive to light. Cardiovascular: Normal rate, regular rhythm and normal heart sounds. Pulmonary/Chest: Effort normal and breath sounds normal. No respiratory distress. She has no wheezes. She has no rales. Abdominal: Soft. She exhibits no distension and no mass. There is no tenderness. Musculoskeletal: She exhibits no edema or tenderness. Neurological: She is alert. Skin: Skin is warm and dry. No rash noted. She is not diaphoretic. No erythema. Psychiatric:   Patient's mood, behavior and though content are at her baseline   Nursing note and vitals reviewed. Assessment:       Diagnosis Orders   1. Confusion  CT Head WO Contrast    XR CHEST STANDARD (2 VW)    URINE RT REFLEX TO CULTURE    CBC Auto Differential    Comprehensive Metabolic Panel    TSH WITH REFLEX TO FT4    Hemoglobin A1C   2. Essential hypertension  URINE RT REFLEX TO CULTURE    CBC Auto Differential    Comprehensive Metabolic Panel    TSH WITH REFLEX TO FT4    Hemoglobin A1C   3. Type 2 diabetes mellitus with diabetic neuropathy, with long-term current use of insulin (HCC)  insulin NPH (HUMULIN N KWIKPEN) 100 UNIT/ML injection pen    URINE RT REFLEX TO CULTURE    CBC Auto Differential    Comprehensive Metabolic Panel    TSH WITH REFLEX TO FT4    Hemoglobin A1C     Lab was unable to wait for late draw.   They will send another  for stat . Will have family take her in for urine specimen because she is unable to provide this today. Will order CT of head and CXR. A1c included with labs. Plan:          Discussed use, benefit, and side effects of prescribed medications. Barriers to medication compliance addressed. All patient questions answered. Pt voiced understanding.

## 2018-07-11 LAB
ESTIMATED AVERAGE GLUCOSE: 312.1 MG/DL
HBA1C MFR BLD: 12.5 %

## 2018-07-20 ENCOUNTER — NURSE ONLY (OUTPATIENT)
Dept: PRIMARY CARE CLINIC | Age: 68
End: 2018-07-20

## 2018-07-20 DIAGNOSIS — R41.0 CONFUSION: Primary | ICD-10-CM

## 2018-07-20 LAB
BILIRUBIN URINE: NEGATIVE
BLOOD, URINE: NEGATIVE
CLARITY: CLEAR
COLOR: ABNORMAL
EPITHELIAL CELLS, UA: 8 /HPF (ref 0–5)
GLUCOSE URINE: 500 MG/DL
HYALINE CASTS: 5 /LPF (ref 0–8)
KETONES, URINE: NEGATIVE MG/DL
LEUKOCYTE ESTERASE, URINE: NEGATIVE
MICROSCOPIC EXAMINATION: YES
NITRITE, URINE: NEGATIVE
PH UA: 5
PROTEIN UA: 100 MG/DL
RBC UA: 5 /HPF (ref 0–4)
SPECIFIC GRAVITY UA: 1.02
URINE REFLEX TO CULTURE: YES
URINE TYPE: ABNORMAL
UROBILINOGEN, URINE: 0.2 E.U./DL
WBC UA: 6 /HPF (ref 0–5)

## 2018-07-22 LAB — URINE CULTURE, ROUTINE: NORMAL

## 2018-07-27 ENCOUNTER — TELEPHONE (OUTPATIENT)
Dept: PRIMARY CARE CLINIC | Age: 68
End: 2018-07-27

## 2020-11-03 PROBLEM — I10 HYPERTENSION: Status: RESOLVED | Noted: 2020-11-03 | Resolved: 2020-11-03

## 2021-05-04 ENCOUNTER — RX ONLY (OUTPATIENT)
Age: 71
Setting detail: RX ONLY
End: 2021-05-04

## 2021-05-04 RX ORDER — FLUTICASONE PROPIONATE 50 UG/1
SPRAY, METERED NASAL
Qty: 1 | Refills: 3 | Status: CANCELLED
Stop reason: CLARIF

## 2021-05-27 LAB — MAMMOGRAPHY, EXTERNAL: NORMAL

## 2022-03-24 ENCOUNTER — APPOINTMENT (OUTPATIENT)
Dept: CT IMAGING | Age: 72
End: 2022-03-24
Payer: MEDICARE

## 2022-03-24 ENCOUNTER — HOSPITAL ENCOUNTER (EMERGENCY)
Age: 72
Discharge: NURSING, REHAB OR LONG-TERM CARE | End: 2022-03-24
Attending: STUDENT IN AN ORGANIZED HEALTH CARE EDUCATION/TRAINING PROGRAM
Payer: MEDICARE

## 2022-03-24 VITALS
DIASTOLIC BLOOD PRESSURE: 59 MMHG | WEIGHT: 210 LBS | BODY MASS INDEX: 37.21 KG/M2 | HEART RATE: 60 BPM | HEIGHT: 63 IN | OXYGEN SATURATION: 100 % | SYSTOLIC BLOOD PRESSURE: 166 MMHG | TEMPERATURE: 97.6 F

## 2022-03-24 DIAGNOSIS — S09.90XA CLOSED HEAD INJURY, INITIAL ENCOUNTER: Primary | ICD-10-CM

## 2022-03-24 PROCEDURE — 70450 CT HEAD/BRAIN W/O DYE: CPT

## 2022-03-24 PROCEDURE — 72125 CT NECK SPINE W/O DYE: CPT

## 2022-03-24 PROCEDURE — 6370000000 HC RX 637 (ALT 250 FOR IP): Performed by: STUDENT IN AN ORGANIZED HEALTH CARE EDUCATION/TRAINING PROGRAM

## 2022-03-24 PROCEDURE — 99284 EMERGENCY DEPT VISIT MOD MDM: CPT

## 2022-03-24 RX ORDER — ACETAMINOPHEN 500 MG
1000 TABLET ORAL ONCE
Status: COMPLETED | OUTPATIENT
Start: 2022-03-24 | End: 2022-03-24

## 2022-03-24 RX ADMIN — ACETAMINOPHEN 1000 MG: 500 TABLET ORAL at 01:16

## 2022-03-24 ASSESSMENT — PAIN SCALES - GENERAL
PAINLEVEL_OUTOF10: 4
PAINLEVEL_OUTOF10: 4

## 2022-03-24 NOTE — Clinical Note
Enriqueta Patiño was seen and treated in our emergency department on 3/24/2022. She may return to work on 03/25/2022. If you have any questions or concerns, please don't hesitate to call.       Render Reef, DO

## 2022-03-24 NOTE — ED NOTES
Report called to Mariel Coelho at [facility].      Valerie Thomson RN  03/24/22 7086

## 2022-03-24 NOTE — ED NOTES
[facility] here to transport pt back to [facility].      Shelby Beltran [facility]  03/24/22 0083

## 2022-03-24 NOTE — ED PROVIDER NOTES
[address] ED      CHIEF COMPLAINT  Fall (Pt from SNF. Per report, pt was walking w/ walker, tripped over rubber mat and fell backwards hitting head on floor. Pt on blood thinners, closed injury, denies LOC. )       HISTORY OF PRESENT ILLNESS  Juan Khan is a 67 y.o. female  who presents to the ED complaining of headache and neck pain after a trip and fall backward, where she hit her head. Patient is reportedly on anticoagulants. She denies any loss of consciousness. Endorses mild headache and lower neck pain currently. No numbness or tingling. No other significant pain. No other complaints, modifying factors or associated symptoms. I have reviewed the following from the nursing documentation.     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)     Overweight(278.02)     Restless legs syndrome     TIA (transient ischemic attack)     Type II or unspecified type diabetes mellitus without mention of complication, not stated as uncontrolled      Past Surgical History:   Procedure Laterality Date    CHOLECYSTECTOMY  1979     Family History   Problem Relation Age of Onset    Hypertension Mother      Social History     Socioeconomic History    Marital status:      Spouse name: Not on file    Number of children: Not on file    Years of education: Not on file    Highest education level: Not on file   Occupational History    Not on file   Tobacco Use    Smoking status: Never Smoker    Smokeless tobacco: Never Used   Substance and Sexual Activity    Alcohol use: No    Drug use: Not on file    Sexual activity: Not on file   Other Topics Concern    Not on file   Social History Narrative    Not on file     Social Determinants of Health Financial Resource Strain:     Difficulty of Paying Living Expenses: Not on file   Food Insecurity:     Worried About Running Out of Food in the Last Year: Not on file    [place] of Food in the Last Year: Not on file   Transportation Needs:     Lack of Transportation (Medical): Not on file    Lack of Transportation (Non-Medical): Not on file   Physical Activity:     Days of Exercise per Week: Not on file    Minutes of Exercise per Session: Not on file   Stress:     Feeling of Stress : Not on file   Social Connections:     Frequency of Communication with Friends and Family: Not on file    Frequency of Social Gatherings with Friends and Family: Not on file    Attends Oriental orthodox Services: Not on file    Active Member of [address] [place] [address] [employer] or Organizations: Not on file    Attends Club or Organization Meetings: Not on file    Marital Status: Not on file   Intimate Partner Violence:     Fear of Current or Ex-Partner: Not on file    Emotionally Abused: Not on file    Physically Abused: Not on file    Sexually Abused: Not on file   Housing Stability:     Unable to Pay for Housing in the Last Year: Not on file    Number of [place] in the Last Year: Not on file    Unstable Housing in the Last Year: Not on file     No current facility-administered medications for this encounter. Current Outpatient Medications   Medication Sig Dispense Refill    insulin NPH (HUMULIN N KWIKPEN) 100 UNIT/ML injection pen 38 units twice daily 270 mL 3    diltiazem (TIAZAC) 420 MG extended release capsule 1 every morning 90 capsule 0    gabapentin (NEURONTIN) 400 MG capsule Take 1 capsule by mouth nightly for 90 days.  90 capsule 0    donepezil (ARICEPT) 10 MG tablet Take 1 tablet by mouth nightly 90 tablet 1    memantine ER (NAMENDA XR) 28 MG CP24 extended release capsule Take 1 capsule by mouth daily 90 capsule 1    levETIRAcetam (KEPPRA) 500 MG tablet Take 1 tablet by mouth 2 times daily 180 tablet 1    hydrochlorothiazide (MICROZIDE) 12.5 MG capsule Take 1 capsule by mouth daily 90 capsule 1    Glucose Blood (BLOOD GLUCOSE TEST STRIPS) STRP Test 4 times daily - ICD10 - E11.9 300 strip 5    irbesartan (AVAPRO) 300 MG tablet Take 1 tablet by mouth daily 90 tablet 1    rOPINIRole (REQUIP) 3 MG tablet Take 1 tablet by mouth 3 times daily 270 tablet 1    atenolol (TENORMIN) 100 MG tablet 1 every morning 90 tablet 1    omeprazole (PRILOSEC) 40 MG delayed release capsule Take 1 capsule by mouth daily 90 capsule 1    metFORMIN (GLUCOPHAGE-XR) 500 MG extended release tablet Take 2 tablets by mouth daily (with breakfast) 180 tablet 1    budesonide-formoterol (SYMBICORT) 160-4.5 MCG/ACT AERO Inhale 2 puffs into the lungs 2 times daily 3 Inhaler 1    albuterol sulfate HFA (PROVENTIL HFA) 108 (90 Base) MCG/ACT inhaler Inhale 2 puffs into the lungs 3 times daily as needed for Wheezing 3 Inhaler 1    atorvastatin (LIPITOR) 20 MG tablet Take 1 tablet by mouth daily 90 tablet 1    Blood Glucose Monitoring Suppl PO 1 kit by Does not apply route once for 1 dose 1 Device 0    Multiple Vitamins-Minerals (THERAPEUTIC MULTIVITAMIN-MINERALS) tablet Take 1 tablet by mouth daily      Blood Glucose Monitoring Suppl (BLOOD GLUCOSE METER) KIT Test blood 3 times daily - ICD9 - 250.92 - Mandi contour meter 1 kit 0     Allergies   Allergen Reactions    Codeine     Erythromycin Nausea And Vomiting    Lisinopril        REVIEW OF SYSTEMS  10 systems reviewed, pertinent positives per HPI otherwise noted to be negative. PHYSICAL EXAM  /76   Pulse 60   Temp 97.6 °F (36.4 °C) (Oral)   Ht 5' 3\" (1.6 m)   Wt 210 lb (95.3 kg)   SpO2 100%   BMI 37.20 kg/m²    General: Appears well. Alert  HEENT: Head atraumatic, Eyes normal inspection, PERRL. Normal ENT inspection, Pharynx normal. No signs of dehydration  NECK: Mild midline tenderness at C5 and C6  RESPIRATORY: Normal breath sounds. No chest wall tenderness.  No respiratory distress  CVS: Heart rate and rhythm regular. No Murmurs  ABDOMEN/GI: Soft, Non-tender, No distention  BACK: Normal inspection  EXTREMITIES: Non-Tender. Full ROM. Normal appearance. No Pedal edema  NEURO: Alert and oriented. Sensation normal. Motor normal  PSYCH: Mood normal. Affect normal.  SKIN: Color normal. No rash. Warm, Dry    RADIOLOGY  CT Head WO Contrast   Final Result   Chronic findings in the brain without acute CT abnormality identified. No acute osseous abnormality identified in the cervical spine. CT Cervical Spine WO Contrast   Final Result   Chronic findings in the brain without acute CT abnormality identified. No acute osseous abnormality identified in the cervical spine. ED COURSE/MDM  Patient seen and evaluated. Old records reviewed. Labs and imaging reviewed and results discussed with patient. CT head and C-spine obtained and shows no acute process. Pain treated with Tylenol. Patient is feeling improved. She will be discharged back to her nursing facility. Given return precautions for severe worsening headaches, abnormal behavior, other concerning symptoms. During the patient's ED course, the patient was given:  Medications   acetaminophen (TYLENOL) tablet 1,000 mg (1,000 mg Oral Given 3/24/22 0116)        CLINICAL IMPRESSION  1. Closed head injury, initial encounter        Blood pressure 138/76, pulse 60, temperature 97.6 °F (36.4 °C), temperature source Oral, height 5' 3\" (1.6 m), weight 210 lb (95.3 kg), SpO2 100 %, not currently breastfeeding. DISPOSITION  Everardo Duff was discharged to SNF in stable condition. Patient was given scripts for the following medications. I counseled patient how to take these medications.    New Prescriptions    No medications on file       Follow-up with:  Chaya Claire MD    Schedule an appointment as soon as possible for a visit in 3 days  Follow up within 3 days, Return to ED sooner if symptoms worsen      DISCLAIMER: This chart was created using Dragon dictation software. Efforts were made by me to ensure accuracy, however some errors may be present due to limitations of this technology and occasionally words are not transcribed correctly.        Shyann Figueroa DO  03/24/22 0071

## 2022-08-18 ENCOUNTER — APPOINTMENT (OUTPATIENT)
Dept: CT IMAGING | Age: 72
End: 2022-08-18
Payer: MEDICARE

## 2022-08-18 ENCOUNTER — APPOINTMENT (OUTPATIENT)
Dept: GENERAL RADIOLOGY | Age: 72
End: 2022-08-18
Payer: MEDICARE

## 2022-08-18 ENCOUNTER — HOSPITAL ENCOUNTER (EMERGENCY)
Age: 72
Discharge: HOME | End: 2022-08-18
Payer: MEDICARE

## 2022-08-18 VITALS
BODY MASS INDEX: 36.05 KG/M2 | HEIGHT: 64 IN | OXYGEN SATURATION: 99 % | DIASTOLIC BLOOD PRESSURE: 80 MMHG | HEART RATE: 69 BPM | TEMPERATURE: 98.3 F | SYSTOLIC BLOOD PRESSURE: 151 MMHG | RESPIRATION RATE: 15 BRPM
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
## 2022-08-18 PROCEDURE — 71045 X-RAY EXAM CHEST 1 VIEW: CPT

## 2022-08-18 PROCEDURE — 70450 CT HEAD/BRAIN W/O DYE: CPT

## 2022-08-18 PROCEDURE — 99284 EMERGENCY DEPT VISIT MOD MDM: CPT

## 2022-08-18 PROCEDURE — 72125 CT NECK SPINE W/O DYE: CPT

## 2022-08-18 PROCEDURE — 72170 X-RAY EXAM OF PELVIS: CPT

## 2022-08-18 ASSESSMENT — ENCOUNTER SYMPTOMS
EYE PAIN: 0
VOMITING: 0
BACK PAIN: 0
ABDOMINAL PAIN: 0
SHORTNESS OF BREATH: 0

## 2022-08-18 ASSESSMENT — PAIN DESCRIPTION - LOCATION: LOCATION: HEAD

## 2022-08-18 ASSESSMENT — PAIN DESCRIPTION - DESCRIPTORS: DESCRIPTORS: ACHING

## 2022-08-18 ASSESSMENT — PAIN SCALES - GENERAL: PAINLEVEL_OUTOF10: 5

## 2022-08-18 ASSESSMENT — PAIN DESCRIPTION - PAIN TYPE: TYPE: ACUTE PAIN

## 2022-08-18 ASSESSMENT — PAIN - FUNCTIONAL ASSESSMENT: PAIN_FUNCTIONAL_ASSESSMENT: 0-10

## 2022-08-18 NOTE — ED PROVIDER NOTES
chest pain. Gastrointestinal:  Negative for abdominal pain and vomiting. Musculoskeletal:  Negative for back pain and neck pain. Neurological:  Negative for dizziness and headaches. 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)     Overweight(278.02)     Restless legs syndrome     TIA (transient ischemic attack)     Type II or unspecified type diabetes mellitus without mention of complication, not stated as uncontrolled        SURGICAL HISTORY     Past Surgical History:   Procedure Laterality Date    Pr-2 [place] By Pass       Discharge Medication List as of 8/18/2022  9:02 PM        CONTINUE these medications which have NOT CHANGED    Details   insulin NPH (HUMULIN N KWIKPEN) 100 UNIT/ML injection pen 38 units twice daily, Disp-270 mL, R-3Adjust Sig      diltiazem (TIAZAC) 420 MG extended release capsule 1 every morning, Disp-90 capsule, R-0This replaces the prescription for 240 mg originally sent. Please disregard that prescription. Normal      gabapentin (NEURONTIN) 400 MG capsule Take 1 capsule by mouth nightly for 90 days. , Disp-90 capsule, R-0Normal      donepezil (ARICEPT) 10 MG tablet Take 1 tablet by mouth nightly, Disp-90 tablet, R-1Normal      memantine ER (NAMENDA XR) 28 MG CP24 extended release capsule Take 1 capsule by mouth daily, Disp-90 capsule, R-1Normal      levETIRAcetam (KEPPRA) 500 MG tablet Take 1 tablet by mouth 2 times daily, Disp-180 tablet, R-1Normal      hydrochlorothiazide (MICROZIDE) 12.5 MG capsule Take 1 capsule by mouth daily, Disp-90 capsule, R-1Normal      Glucose Blood (BLOOD GLUCOSE TEST STRIPS) STRP Test 4 times daily - ICD10 - E11.9, Disp-300 strip, R-5Please give Mandi Contour test strips -give plain contour strips- DO NOT send Contour Next.    ICD10 - E11.9Normal      irbesartan (AVAPRO) 300 MG tablet Take 1 tablet by mouth daily, Disp-90 tablet, R-1Normal      rOPINIRole (REQUIP) 3 MG tablet Take 1 tablet by mouth 3 times daily, Disp-270 tablet, R-1Normal      atenolol (TENORMIN) 100 MG tablet 1 every morning, Disp-90 tablet, R-1Normal      omeprazole (PRILOSEC) 40 MG delayed release capsule Take 1 capsule by mouth daily, Disp-90 capsule, R-1Normal      metFORMIN (GLUCOPHAGE-XR) 500 MG extended release tablet Take 2 tablets by mouth daily (with breakfast), Disp-180 tablet, R-1Normal      budesonide-formoterol (SYMBICORT) 160-4.5 MCG/ACT AERO Inhale 2 puffs into the lungs 2 times daily, Disp-3 Inhaler, R-1Normal      albuterol sulfate HFA (PROVENTIL HFA) 108 (90 Base) MCG/ACT inhaler Inhale 2 puffs into the lungs 3 times daily as needed for Wheezing, Disp-3 Inhaler, R-1Normal      atorvastatin (LIPITOR) 20 MG tablet Take 1 tablet by mouth daily, Disp-90 tablet, R-1Replaces simvastatinNormal      Blood Glucose Monitoring Suppl PO 1 kit by Does not apply route once for 1 dose, Does not apply, ONCE Starting Thu 12/14/2017, 1 dose, Disp-1 Device, R-0, Normal      Multiple Vitamins-Minerals (THERAPEUTIC MULTIVITAMIN-MINERALS) tablet Take 1 tablet by mouth daily      Blood Glucose Monitoring Suppl (BLOOD GLUCOSE METER) KIT Disp-1 kit, R-0, NormalTest blood 3 times daily - ICD9 - 250.92 - Mandi contour meter             ALLERGIES     Codeine, Erythromycin, and Lisinopril    FAMILYHISTORY       Family History   Problem Relation Age of Onset    Hypertension Mother         SOCIAL HISTORY       Social History     Socioeconomic History    Marital status:    Tobacco Use    Smoking status: Never    Smokeless tobacco: Never   Substance and Sexual Activity    Alcohol use: No       SCREENINGS    Jay Coma Scale  Eye Opening: Spontaneous  Best Verbal Response: Confused  Best Motor Response: Obeys commands  Jordyn Coma Scale Score: 14        PHYSICAL EXAM    (up to 7 for level 4, 8 or more for level 5)     ED Triage Vitals [08/18/22 1546]   BP Temp Temp src Heart Rate Resp SpO2 Height Weight   (!) 163/73 98.3 °F (36.8 °C) -- 63 16 96 % 5' 4\" (1.626 m) --       Physical Exam  Vitals and nursing note reviewed. Constitutional:       Appearance: Normal appearance. She is not toxic-appearing or diaphoretic. HENT:      Head: Normocephalic and atraumatic. Nose: Nose normal.   Eyes:      General:         Right eye: No discharge. Left eye: No discharge. Cardiovascular:      Rate and Rhythm: Normal rate and regular rhythm. Pulses: Normal pulses. Heart sounds: No murmur heard. Pulmonary:      Effort: Pulmonary effort is normal. No respiratory distress. Breath sounds: No wheezing or rhonchi. Abdominal:      Palpations: Abdomen is soft. Tenderness: There is no abdominal tenderness. There is no guarding or rebound. Musculoskeletal:         General: Normal range of motion. Cervical back: Normal range of motion and neck supple. Skin:     General: Skin is warm and dry. Neurological:      General: No focal deficit present. Mental Status: She is alert. Mental status is at baseline. She is confused. GCS: GCS eye subscore is 4. GCS verbal subscore is 4. GCS motor subscore is 6. Cranial Nerves: Cranial nerves are intact. No cranial nerve deficit. Sensory: No sensory deficit. Motor: No weakness. Psychiatric:         Mood and Affect: Mood normal.         Behavior: Behavior normal.       DIAGNOSTIC RESULTS   LABS:    Labs Reviewed - No data to display    When ordered, only abnormal lab results are displayed. All other labs were within normal range or not returned as of this dictation. EKG:  When ordered, EKG's are interpreted by the Emergency Department Physician in the absence of a cardiologist.  Please see their note for interpretation of EKG.      RADIOLOGY:   Non-plain film images such as CT, Ultrasound and MRI are read by the radiologist. Plain radiographic images are visualized andpreliminarily interpreted by the  ED Provider with the below findings:        Interpretation perthe Radiologist below, if available at the time of this note:    CT CERVICAL SPINE WO CONTRAST   Final Result   CT BRAIN:      1. No acute intracranial abnormality. 2. Involutional parenchymal changes with microvascular ischemic disease and   chronic right parietal encephalomalacia/gliosis. CT CERVICAL SPINE:      1. No acute abnormality of the cervical spine. 2. Partially visualized nodular opacities in left upper lung, new compared to   03/24/2022. Finding may represent foci of inflammation/infection. Can   consider follow-up chest CT to ensure resolution. CT HEAD WO CONTRAST   Final Result   CT BRAIN:      1. No acute intracranial abnormality. 2. Involutional parenchymal changes with microvascular ischemic disease and   chronic right parietal encephalomalacia/gliosis. CT CERVICAL SPINE:      1. No acute abnormality of the cervical spine. 2. Partially visualized nodular opacities in left upper lung, new compared to   03/24/2022. Finding may represent foci of inflammation/infection. Can   consider follow-up chest CT to ensure resolution. XR PELVIS (1-2 VIEWS)   Final Result   No acute fracture or dislocation. Mild degenerative changes as described   above         XR CHEST PORTABLE   Final Result   No acute findings           No results found.       PROCEDURES   Unless otherwise noted below, none     Procedures    CRITICAL CARE TIME   N/A    CONSULTS:  None      EMERGENCY DEPARTMENT COURSE and DIFFERENTIAL DIAGNOSIS/MDM:   Vitals:    Vitals:    08/18/22 1546 08/18/22 1602 08/18/22 2103 08/18/22 2107   BP: (!) 163/73 (!) 144/57 (!) 144/56 (!) 151/80   Pulse: 63  60 69   Resp: 16  16 15   Temp: 98.3 °F (36.8 °C)      SpO2: 96%   99%   Height: 5' 4\" (1.626 m)          Patient was given thefollowing medications:  Medications - No data to display      Is this patient to be included in the SEP-1 Core Measure due to severe sepsis or septic shock? No   Exclusion criteria - the patient is NOT to be included for SEP-1 Core Measure due to: Infection is not suspected    Patient CT scan, of brain and neck were read as negative. Patient's chest x-ray had pelvic x-ray were also negative. This time I will see any obvious injury from her fall. The patient apparently reported a mechanical type fall. Patient has a history of dementia but is on Eliquis. She has no chest pain or abdominal pain. No bruising , cuts, scrapes or bleeding. Patient verbalized understanding and agrees to treatment plan and discharge. She will be transported back to [facility] for further evaluation and treatment. FINAL IMPRESSION      1.  Fall, initial encounter          DISPOSITION/PLAN   DISPOSITION Decision To Discharge 08/18/2022 06:47:20 PM      PATIENT REFERREDTO:  Rashard Lynn MD  [address] [facility] [address]  [phone]    Schedule an appointment as soon as possible for a visit       [facility] PHYS[facility] ED  [address] [facility] [address]  [phone]  Go to   If symptoms worsen    DISCHARGE MEDICATIONS:  Discharge Medication List as of 8/18/2022  9:02 PM          DISCONTINUED MEDICATIONS:  Discharge Medication List as of 8/18/2022  9:02 PM                 (Please note that portions ofthis note were completed with a voice recognition program.  Efforts were made to edit the dictations but occasionally words are mis-transcribed.)    OKSANA Marx CNP (electronically signed)             OKSANA Marx CNP  08/20/22 8643

## 2023-02-08 ENCOUNTER — APPOINTMENT (OUTPATIENT)
Dept: CT IMAGING | Age: 73
End: 2023-02-08
Payer: MEDICARE

## 2023-02-08 ENCOUNTER — APPOINTMENT (OUTPATIENT)
Dept: GENERAL RADIOLOGY | Age: 73
End: 2023-02-08
Payer: MEDICARE

## 2023-02-08 ENCOUNTER — HOSPITAL ENCOUNTER (EMERGENCY)
Age: 73
Discharge: HOME | End: 2023-02-08
Attending: EMERGENCY MEDICINE
Payer: MEDICARE

## 2023-02-08 VITALS
HEART RATE: 63 BPM | DIASTOLIC BLOOD PRESSURE: 78 MMHG | HEIGHT: 64 IN | WEIGHT: 210 LBS | OXYGEN SATURATION: 92 % | BODY MASS INDEX: 35.85 KG/M2 | RESPIRATION RATE: 18 BRPM | TEMPERATURE: 97.7 F | SYSTOLIC BLOOD PRESSURE: 159 MMHG
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
## 2023-02-08 DIAGNOSIS — S00.03XA HEMATOMA OF SCALP, INITIAL ENCOUNTER: ICD-10-CM

## 2023-02-08 DIAGNOSIS — S09.90XA INJURY OF HEAD, INITIAL ENCOUNTER: Primary | ICD-10-CM

## 2023-02-08 LAB
AMORPHOUS: ABNORMAL /HPF
ANION GAP SERPL CALCULATED.3IONS-SCNC: 8 MMOL/L (ref 3–16)
BACTERIA: ABNORMAL /HPF
BASOPHILS ABSOLUTE: 0.1 K/UL (ref 0–0.2)
BASOPHILS RELATIVE PERCENT: 0.6 %
BILIRUBIN URINE: NEGATIVE
BLOOD, URINE: ABNORMAL
BUN BLDV-MCNC: 39 MG/DL (ref 7–20)
CALCIUM SERPL-MCNC: 8.7 MG/DL (ref 8.3–10.6)
CHLORIDE BLD-SCNC: 103 MMOL/L (ref 99–110)
CLARITY: CLEAR
CO2: 29 MMOL/L (ref 21–32)
COLOR: YELLOW
CREAT SERPL-MCNC: 1.2 MG/DL (ref 0.6–1.2)
EOSINOPHILS ABSOLUTE: 0.4 K/UL (ref 0–0.6)
EOSINOPHILS RELATIVE PERCENT: 3.8 %
GFR SERPL CREATININE-BSD FRML MDRD: 48 ML/MIN/{1.73_M2}
GLUCOSE BLD-MCNC: 104 MG/DL (ref 70–99)
GLUCOSE URINE: NEGATIVE MG/DL
HCT VFR BLD CALC: 37 % (ref 36–48)
HEMOGLOBIN: 12.2 G/DL (ref 12–16)
KETONES, URINE: NEGATIVE MG/DL
LEUKOCYTE ESTERASE, URINE: NEGATIVE
LYMPHOCYTES ABSOLUTE: 1.7 K/UL (ref 1–5.1)
LYMPHOCYTES RELATIVE PERCENT: 17.1 %
MCH RBC QN AUTO: 28.7 PG (ref 26–34)
MCHC RBC AUTO-ENTMCNC: 33 G/DL (ref 31–36)
MCV RBC AUTO: 86.9 FL (ref 80–100)
MICROSCOPIC EXAMINATION: YES
MONOCYTES ABSOLUTE: 0.6 K/UL (ref 0–1.3)
MONOCYTES RELATIVE PERCENT: 6.4 %
MUCUS: ABNORMAL /LPF
NEUTROPHILS ABSOLUTE: 7 K/UL (ref 1.7–7.7)
NEUTROPHILS RELATIVE PERCENT: 72.1 %
NITRITE, URINE: NEGATIVE
PDW BLD-RTO: 14 % (ref 12.4–15.4)
PH UA: 5.5 (ref 5–8)
PLATELET # BLD: 248 K/UL (ref 135–450)
PMV BLD AUTO: 8.3 FL (ref 5–10.5)
POTASSIUM REFLEX MAGNESIUM: 4.9 MMOL/L (ref 3.5–5.1)
PROTEIN UA: >=300 MG/DL
RBC # BLD: 4.26 M/UL (ref 4–5.2)
RBC UA: ABNORMAL /HPF (ref 0–4)
SODIUM BLD-SCNC: 140 MMOL/L (ref 136–145)
SPECIFIC GRAVITY UA: 1.02 (ref 1–1.03)
TROPONIN: <0.01 NG/ML
URINE REFLEX TO CULTURE: ABNORMAL
URINE TYPE: ABNORMAL
UROBILINOGEN, URINE: 0.2 E.U./DL
WBC # BLD: 9.7 K/UL (ref 4–11)
WBC UA: ABNORMAL /HPF (ref 0–5)

## 2023-02-08 PROCEDURE — 73552 X-RAY EXAM OF FEMUR 2/>: CPT

## 2023-02-08 PROCEDURE — 6370000000 HC RX 637 (ALT 250 FOR IP): Performed by: EMERGENCY MEDICINE

## 2023-02-08 PROCEDURE — 70450 CT HEAD/BRAIN W/O DYE: CPT

## 2023-02-08 PROCEDURE — 99284 EMERGENCY DEPT VISIT MOD MDM: CPT

## 2023-02-08 PROCEDURE — 80048 BASIC METABOLIC PNL TOTAL CA: CPT

## 2023-02-08 PROCEDURE — 84484 ASSAY OF TROPONIN QUANT: CPT

## 2023-02-08 PROCEDURE — 72170 X-RAY EXAM OF PELVIS: CPT

## 2023-02-08 PROCEDURE — 81001 URINALYSIS AUTO W/SCOPE: CPT

## 2023-02-08 PROCEDURE — 85025 COMPLETE CBC W/AUTO DIFF WBC: CPT

## 2023-02-08 PROCEDURE — 72125 CT NECK SPINE W/O DYE: CPT

## 2023-02-08 PROCEDURE — 36415 COLL VENOUS BLD VENIPUNCTURE: CPT

## 2023-02-08 PROCEDURE — 73030 X-RAY EXAM OF SHOULDER: CPT

## 2023-02-08 RX ORDER — METOPROLOL SUCCINATE 200 MG/1
200 TABLET, EXTENDED RELEASE ORAL DAILY
COMMUNITY
Start: 2021-01-21

## 2023-02-08 RX ORDER — AMLODIPINE BESYLATE 10 MG/1
10 TABLET ORAL DAILY
COMMUNITY
Start: 2019-03-27

## 2023-02-08 RX ORDER — CLONIDINE 0.3 MG/24H
1 PATCH, EXTENDED RELEASE TRANSDERMAL WEEKLY
COMMUNITY
Start: 2021-12-27

## 2023-02-08 RX ORDER — ACETAMINOPHEN 325 MG/1
650 TABLET ORAL ONCE
Status: COMPLETED | OUTPATIENT
Start: 2023-02-08 | End: 2023-02-08

## 2023-02-08 RX ORDER — LANOLIN ALCOHOL/MO/W.PET/CERES
18 CREAM (GRAM) TOPICAL
COMMUNITY

## 2023-02-08 RX ORDER — DULAGLUTIDE 0.75 MG/.5ML
INJECTION, SOLUTION SUBCUTANEOUS
COMMUNITY
Start: 2023-02-04

## 2023-02-08 RX ORDER — ATORVASTATIN CALCIUM 80 MG/1
TABLET, FILM COATED ORAL
COMMUNITY
Start: 2023-02-08

## 2023-02-08 RX ORDER — TRAMADOL HYDROCHLORIDE 50 MG/1
50 TABLET ORAL EVERY 6 HOURS PRN
COMMUNITY
Start: 2021-01-21

## 2023-02-08 RX ORDER — INSULIN GLARGINE 300 U/ML
45 INJECTION, SOLUTION SUBCUTANEOUS 2 TIMES DAILY
COMMUNITY
Start: 2021-12-27

## 2023-02-08 RX ORDER — RIVASTIGMINE 9.5 MG/24H
1 PATCH, EXTENDED RELEASE TRANSDERMAL DAILY
COMMUNITY
Start: 2021-12-27

## 2023-02-08 RX ORDER — MIRTAZAPINE 7.5 MG/1
TABLET, FILM COATED ORAL
COMMUNITY
Start: 2023-01-28

## 2023-02-08 RX ORDER — ESCITALOPRAM OXALATE 10 MG/1
10 TABLET ORAL DAILY
COMMUNITY

## 2023-02-08 RX ORDER — BUSPIRONE HYDROCHLORIDE 10 MG/1
TABLET ORAL
COMMUNITY
Start: 2023-02-01

## 2023-02-08 RX ORDER — ACETAMINOPHEN 500 MG
500 TABLET ORAL
COMMUNITY

## 2023-02-08 RX ADMIN — ACETAMINOPHEN 650 MG: 325 TABLET ORAL at 13:14

## 2023-02-08 ASSESSMENT — PAIN - FUNCTIONAL ASSESSMENT: PAIN_FUNCTIONAL_ASSESSMENT: 0-10

## 2023-02-08 ASSESSMENT — ENCOUNTER SYMPTOMS: COLOR CHANGE: 1

## 2023-02-08 ASSESSMENT — PAIN SCALES - GENERAL
PAINLEVEL_OUTOF10: 9
PAINLEVEL_OUTOF10: 10
PAINLEVEL_OUTOF10: 9

## 2023-02-08 ASSESSMENT — LIFESTYLE VARIABLES
HOW OFTEN DO YOU HAVE A DRINK CONTAINING ALCOHOL: NEVER
HOW MANY STANDARD DRINKS CONTAINING ALCOHOL DO YOU HAVE ON A TYPICAL DAY: PATIENT DOES NOT DRINK

## 2023-02-08 NOTE — ED NOTES
1718-Call to [facility] patient transport, booked transport back to the [facility] nursing facility with an eta of 1800.      Tashia Walsh  02/08/23 1736

## 2023-02-08 NOTE — ED NOTES
Report given to Baron Fran BROWN.       Dereck Santos, RN  02/08/23 [facility] Kimberly Kim RN  02/08/23 7106

## 2023-02-08 NOTE — ED PROVIDER NOTES
Emergency Department Attending Physician Note  Location: [place] [address] ED  2/8/2023       Pt Name: Michael Francis  MRN: [id number]  [place] 1950    Date of evaluation: 2/8/2023  Provider: Pieter Clinton DO  PCP: Avi Smith MD    Note Started: 12:41 PM EST 2/8/23    CHIEF COMPLAINT  Chief Complaint   Patient presents with    Fall     Patient is from [facility] where EMS states she had fallen out of bed, hit head, has a contusion and a knot on forehead. Denies LOC. Patient complaints of bilateral knee pain, and thumb pain where she tried to catch herself. HISTORY OF PRESENT ILLNESS:  History obtained by patient. Limitations to history : None. Michael Francis is a 67 y.o. female with a significant PMHx of dementia, possibly Parkinson's disease as she is on Requip, Aricept, and Namenda, anxiety, depression, previous TIAs, diabetes, and other comorbidities as listed below, that presents emergency department today with unwitnessed fall; patient does not know why she fell, but says her head and shoulder hurt. Has an obvious hematoma to her forehead, no wounds or bleeding. Per EMS, she had actually fallen out of bed, and no one had witnessed LOC. She says she cannot move her arm. Otherwise, history is limited secondary to patient's clinical picture. Does not remember what happened, is moaning in pain on arrival.  I do not see Eliquis on her current medication list, but recently on a PCPs note, it had mentioned Eliquis 5 mg daily. Unclear if on Eliquis on initial arrival.      Nursing Notes were all reviewed and agreed with or any disagreements were addressed in the HPI.     MEDICAL HISTORY  Past Medical History:   Diagnosis Date    Amenorrhea     Anxiety     Carpal tunnel syndrome of right wrist 7/16/2013    Depression     Fibromyalgia     Gastritis     HTN (hypertension)     Hypertension     Migraines     premenstrual    Narcolepsy without cataplexy(347.00) Overweight(278.02)     Restless legs syndrome     TIA (transient ischemic attack)     Type II or unspecified type diabetes mellitus without mention of complication, not stated as uncontrolled         SURGICAL HISTORY  Past Surgical History:   Procedure Laterality Date    CHOLECYSTECTOMY  1979       CURRENT MEDICATIONS  Discharge Medication List as of 2/8/2023  6:04 PM        CONTINUE these medications which have NOT CHANGED    Details   amLODIPine (NORVASC) 10 MG tablet Take 10 mg by mouth dailyHistorical Med      cloNIDine (CATAPRES) 0.3 MG/24HR PTWK Place 1 patch onto the skin once a weekHistorical Med      rivastigmine (EXELON) 9.5 MG/24HR Place 1 patch onto the skin dailyHistorical Med      metoprolol succinate (TOPROL XL) 200 MG extended release tablet Take 200 mg by mouth dailyHistorical Med      insulin glargine, 1 unit dial, (TOUJEO SOLOSTAR) 300 UNIT/ML concentrated injection pen Inject 45 Units into the skin 2 times dailyHistorical Med      traMADol (ULTRAM) 50 MG tablet Take 50 mg by mouth every 6 hours as needed. Historical Med      insulin aspart (NOVOLOG) 100 UNIT/ML injection pen Inject 10 Units into the skinHistorical Med      apixaban (ELIQUIS) 5 MG TABS tablet Take 5 mg by mouth 2 times dailyHistorical Med      B Complex Vitamins (VITAMIN B COMPLEX PO) Take by mouthHistorical Med      atorvastatin (LIPITOR) 80 MG tablet Historical Med      escitalopram (LEXAPRO) 10 MG tablet Take 10 mg by mouth dailyHistorical Med      melatonin 3 MG TABS tablet Take 18 mg by mouthHistorical Med      mirtazapine (REMERON) 7.5 MG tablet Historical Med      TRULICITY 8.26 LS/9.1KI SOPN DAWHistorical Med      busPIRone (BUSPAR) 10 MG tablet Historical Med      metFORMIN (GLUCOPHAGE) 500 MG tablet Historical Med      acetaminophen (TYLENOL) 500 MG tablet Take 500 mg by mouthHistorical Med      gabapentin (NEURONTIN) 400 MG capsule Take 1 capsule by mouth nightly for 90 days. , Disp-90 capsule, R-0Normal      memantine ER (NAMENDA XR) 28 MG CP24 extended release capsule Take 1 capsule by mouth daily, Disp-90 capsule, R-1Normal      levETIRAcetam (KEPPRA) 500 MG tablet Take 1 tablet by mouth 2 times daily, Disp-180 tablet, R-1Normal      hydrochlorothiazide (MICROZIDE) 12.5 MG capsule Take 1 capsule by mouth daily, Disp-90 capsule, R-1Normal      Glucose Blood (BLOOD GLUCOSE TEST STRIPS) STRP Test 4 times daily - ICD10 - E11.9, Disp-300 strip, R-5Please give Mandi Contour test strips -give plain contour strips- DO NOT send Contour Next.    ICD10 - E11.9Normal      irbesartan (AVAPRO) 300 MG tablet Take 1 tablet by mouth daily, Disp-90 tablet, R-1Normal      rOPINIRole (REQUIP) 3 MG tablet Take 1 tablet by mouth 3 times daily, Disp-270 tablet, R-1Normal      omeprazole (PRILOSEC) 40 MG delayed release capsule Take 1 capsule by mouth daily, Disp-90 capsule, R-1Normal      budesonide-formoterol (SYMBICORT) 160-4.5 MCG/ACT AERO Inhale 2 puffs into the lungs 2 times daily, Disp-3 Inhaler, R-1Normal      albuterol sulfate HFA (PROVENTIL HFA) 108 (90 Base) MCG/ACT inhaler Inhale 2 puffs into the lungs 3 times daily as needed for Wheezing, Disp-3 Inhaler, R-1Normal      Blood Glucose Monitoring Suppl PO 1 kit by Does not apply route once for 1 dose, Does not apply, ONCE Starting Thu 12/14/2017, 1 dose, Disp-1 Device, R-0, Normal      Blood Glucose Monitoring Suppl (BLOOD GLUCOSE METER) KIT Disp-1 kit, R-0, NormalTest blood 3 times daily - ICD9 - 250.92 - Mandi contour meter             ALLERGIES  Codeine, Erythromycin, and Lisinopril    FAMILYHISTORY  Family History   Problem Relation Age of Onset    Hypertension Mother        SOCIAL HISTORY  Social History     Tobacco Use    Smoking status: Never    Smokeless tobacco: Never   Substance Use Topics    Alcohol use: No    Drug use: Never       SCREENINGS    Jordyn Coma Scale  Eye Opening: Spontaneous  Best Verbal Response: Oriented  Best Motor Response: Obeys commands  [place] Coma Scale Score: 15       CIWA Assessment  BP: (!) 159/78  Heart Rate: 63             REVIEW OF SYSTEMS:    Review of Systems   Unable to perform ROS: Dementia   Musculoskeletal:  Positive for arthralgias (left shoulder). Skin:  Positive for color change (forehead hematoma). Negative for wound. Neurological:  Positive for headaches. Positives and Pertinent negatives as per HPI. PHYSICAL EXAM:     Vitals:    02/08/23 1632 02/08/23 1735 02/08/23 1810 02/08/23 1811   BP: (!) 168/76 (!) 155/73  (!) 159/78   Pulse: 60 77 63    Resp: 15 19 18    Temp:       TempSrc:       SpO2: 97% 95% 92%    Weight:       Height:           Physical Exam  Vitals reviewed. Constitutional:       General: She is not in acute distress. Appearance: Normal appearance. She is not ill-appearing. HENT:      Head: Normocephalic and atraumatic. Right Ear: External ear normal.      Left Ear: External ear normal.      Nose: Nose normal. No congestion. Mouth/Throat:      Mouth: Mucous membranes are moist.      Pharynx: Oropharynx is clear. Eyes:      General:         Right eye: No discharge. Left eye: No discharge. Conjunctiva/sclera: Conjunctivae normal.   Cardiovascular:      Rate and Rhythm: Normal rate and regular rhythm. Pulmonary:      Effort: Pulmonary effort is normal. No respiratory distress. Breath sounds: Normal breath sounds. Abdominal:      General: Abdomen is flat. There is no distension. Palpations: Abdomen is soft. Tenderness: There is no abdominal tenderness. Musculoskeletal:         General: Tenderness and signs of injury present. No swelling. Cervical back: Normal range of motion and neck supple. Comments: Not range of motion her left upper extremity. Tenderness to palpation along the proximal humerus. Patient says she has knee pain, on my palpation of the lower extremities, no tenderness. Has full range of motion of the knees, no wounds or abrasions.   Full range of motion the ankles. No swelling. Skin:     General: Skin is warm and dry. Neurological:      General: No focal deficit present. Mental Status: She is alert and oriented to person, place, and time. Psychiatric:         Mood and Affect: Mood normal.         Behavior: Behavior normal.         DIAGNOSTIC RESULTS:  LABS:    Labs Reviewed   BASIC METABOLIC PANEL W/ REFLEX TO MG FOR LOW K - Abnormal; Notable for the following components:       Result Value    Glucose 104 (*)     BUN 39 (*)     Est, Glom Filt Rate 48 (*)     All other components within normal limits   URINALYSIS WITH REFLEX TO CULTURE - Abnormal; Notable for the following components:    Blood, Urine TRACE-INTACT (*)     Protein, UA >=300 (*)     All other components within normal limits   MICROSCOPIC URINALYSIS - Abnormal; Notable for the following components:    Mucus, UA Rare (*)     Bacteria, UA 1+ (*)     All other components within normal limits   CBC WITH AUTO DIFFERENTIAL   TROPONIN       When ordered, only abnormal lab results are displayed. All other labs were within normal range or not returned as of this dictation. RADIOLOGY:    Plain radiographic images are visualized and preliminarily interpreted by the ED Provider with the below findings: No obvious osseous abnormalities acutely to the pelvis, or underlying the area of her bruise, midshaft femur. Non-plain film images such as CT, Ultrasound and MRI are read by the radiologist. Interpretation per the Radiologist below, if available at the time of this note:  XR PELVIS (1-2 VIEWS)   Final Result      No acute findings in the pelvis. XR FEMUR LEFT (MIN 2 VIEWS)   Final Result      No acute findings in the left femur. CT CERVICAL SPINE WO CONTRAST   Final Result      No evidence of fracture with degenerative changes as described. CT HEAD WO CONTRAST   Final Result   No acute intracranial abnormality. Moderate cerebral atrophy.   White matter microvascular disease. Right frontal scalp hematoma. XR SHOULDER LEFT (MIN 2 VIEWS)   Final Result   1. No acute abnormality. PROCEDURES:  Unless otherwise noted below, none. Procedures      MEDICATIONS:   Medications   acetaminophen (TYLENOL) tablet 650 mg (650 mg Oral Given 2/8/23 1314)     _____________________________________    MEDICAL DECISION MAKING:     Patient is a 67 y.o. female with a significant PMHx of dementia, Parkinson's disease based on her medications, and other comorbidities as above, presenting in the nursing with reports of falling out of bed. Patient initially arrives on arrival, is talkative, alert, no acute distress. She has a hematoma on her forehead, and on my physical exam no significant tenderness to palpation in her chest, abdomen, back, pelvis, legs. She does have tenderness to palpation to her left shoulder, no obvious deformities, but she will not move it. X-rays pending. Additionally, basic labs to rule out infectious process, anemia, or ACS pending, as this was an unwitnessed fall. Hypertensive to 159/78 on arrival to emergency department, otherwise no acute clinical cardiopulmonary distress    Laboratory evaluation today reveals negative troponin, baseline CKD, however no significant electrolyte dyscrasia. CBC without acute anemia, no significant signs of leukocytosis. Urinalysis without signs of UTI. CT head without acute process. Shoulder x-ray without fracture. 6:07 PM EST  As transport is arriving for patient to go back to the nursing home, patient has begun to have what we suspect is sundowning. She has been in the emergency department for over 6 hours and while observed, she has remained comfortable, and has stood on both legs for hygiene. At this time, now she is screaming in pain, grabbing at her brief, very confused asking what is :between my legs? \"  On my reevaluation, she is now screaming in pain to her left leg, despite having walked here in the emergency department. On my initial evaluation as above, she had no tenderness to palpation to her bilateral lower extremities. Before going home. I order more x-rays to rule out midshaft femur fracture where she is screaming in pain, and on my read in the emergency department, I do not see any obvious fractures of the femur, but hip. Will discharge home back to the nursing home at this time. On her return back to the emergency department from [id number] [id number], patient is quite upset with staff that she never needed x-rays, because she knew she did not have a broken bone. Discharge back to nursing home in stable condition. Is this patient to be included in the SEP-1 Core Measure due to severe sepsis or septic shock? No   Exclusion criteria - the patient is NOT to be included for SEP-1 Core Measure due to: Infection is not suspected      CLINICAL IMPRESSION:     ICD-10-CM    1. Injury of head, initial encounter  S09.90XA       2. Hematoma of scalp, initial encounter  S00. 03XA       3. Fall from bed, initial encounter  W06. XXXA             DISPOSITION:  I discussed the results, including any incidental findings, with patient and through shared decision making. Disposition today from the ED will be: Discharge to nursing home in good condition. Questions answered. They are agreeable to plan and express understanding of plan.      Social Determinants : dementia, nursing home      DISCHARGE MEDICATIONS (if applicable):  Discharge Medication List as of 2/8/2023  6:04 PM          DISCONTINUED MEDICATIONS:  Discharge Medication List as of 2/8/2023  6:04 PM        STOP taking these medications       insulin NPH (HUMULIN N KWIKPEN) 100 UNIT/ML injection pen Comments:   Reason for Stopping:         diltiazem (TIAZAC) 420 MG extended release capsule Comments:   Reason for Stopping:         donepezil (ARICEPT) 10 MG tablet Comments:   Reason for Stopping:         atenolol (TENORMIN) 100 MG tablet Comments:   Reason for Stopping:         metFORMIN (GLUCOPHAGE-XR) 500 MG extended release tablet Comments:   Reason for Stopping:         Multiple Vitamins-Minerals (THERAPEUTIC MULTIVITAMIN-MINERALS) tablet Comments:   Reason for Stopping:                    DISPOSITION REFERRAL (if applicable):  [place] [facility] [place] [facility]  [address]  [phone]    If symptoms worsen, As needed    Mariia Yi MD  [address] [facility]  [phone]    Schedule an appointment as soon as possible for a visit       _____________________________________      Earileana Bejarano DO, ([facility]) am the primary attending of record and contributed the majority of evaluation and treatment of emergent care for this encounter. This chart was generated in part by using Dragon Dictation system and may contain errors related to that system including errors in grammar, punctuation, and spelling, as well as words and phrases that may be inappropriate. If there are any questions or concerns please feel free to contact the dictating provider for clarification.      JONI BEJARANO DO (electronically signed)   [address] [facility],   02/10/23 9216

## 2023-02-08 NOTE — ED NOTES
Asked Fany Kaiser to start working on transport back to [facility].       Dereck Santos, STEPHANIE  02/08/23 7645

## 2023-02-08 NOTE — ED NOTES
Ambulated patient, tolerated semi well. Patient urinated in bed, complete linen change, placed in gown, and pericare provided. Patient stood to be changed and rolled very well. Dr Andrew Whitaker informed.       Andrey Rosales RN  02/08/23 4223

## 2023-09-12 ENCOUNTER — APPOINTMENT (OUTPATIENT)
Dept: GENERAL RADIOLOGY | Age: 73
DRG: 291 | End: 2023-09-12
Payer: MEDICARE

## 2023-09-12 ENCOUNTER — HOSPITAL ENCOUNTER (INPATIENT)
Age: 73
LOS: 11 days | Discharge: NURSING, REHAB OR LONG-TERM CARE | DRG: 291 | End: 2023-09-23
Attending: EMERGENCY MEDICINE | Admitting: STUDENT IN AN ORGANIZED HEALTH CARE EDUCATION/TRAINING PROGRAM
Payer: MEDICARE

## 2023-09-12 DIAGNOSIS — I50.9 ACUTE CONGESTIVE HEART FAILURE, UNSPECIFIED HEART FAILURE TYPE (HCC): Primary | ICD-10-CM

## 2023-09-12 DIAGNOSIS — G25.81 RESTLESS LEGS SYNDROME: ICD-10-CM

## 2023-09-12 DIAGNOSIS — I10 ESSENTIAL HYPERTENSION: ICD-10-CM

## 2023-09-12 DIAGNOSIS — M79.7 FIBROMYALGIA: ICD-10-CM

## 2023-09-12 DIAGNOSIS — R06.02 SHORTNESS OF BREATH: ICD-10-CM

## 2023-09-12 PROBLEM — I50.43 CHF (CONGESTIVE HEART FAILURE), NYHA CLASS I, ACUTE ON CHRONIC, COMBINED (HCC): Status: ACTIVE | Noted: 2023-09-12

## 2023-09-12 LAB
ALBUMIN SERPL-MCNC: 3.4 G/DL (ref 3.4–5)
ALBUMIN/GLOB SERPL: 1.8 {RATIO} (ref 1.1–2.2)
ALP SERPL-CCNC: 88 U/L (ref 40–129)
ALT SERPL-CCNC: 27 U/L (ref 10–40)
ANION GAP SERPL CALCULATED.3IONS-SCNC: 7 MMOL/L (ref 3–16)
AST SERPL-CCNC: 18 U/L (ref 15–37)
BASOPHILS # BLD: 0.1 K/UL (ref 0–0.2)
BASOPHILS NFR BLD: 0.6 %
BILIRUB SERPL-MCNC: 0.3 MG/DL (ref 0–1)
BUN SERPL-MCNC: 46 MG/DL (ref 7–20)
CALCIUM SERPL-MCNC: 8.8 MG/DL (ref 8.3–10.6)
CHLORIDE SERPL-SCNC: 105 MMOL/L (ref 99–110)
CO2 SERPL-SCNC: 28 MMOL/L (ref 21–32)
CREAT SERPL-MCNC: 1.8 MG/DL (ref 0.6–1.2)
DEPRECATED RDW RBC AUTO: 13.4 % (ref 12.4–15.4)
EOSINOPHIL # BLD: 0.3 K/UL (ref 0–0.6)
EOSINOPHIL NFR BLD: 3.1 %
GFR SERPLBLD CREATININE-BSD FMLA CKD-EPI: 29 ML/MIN/{1.73_M2}
GLUCOSE BLD-MCNC: 226 MG/DL (ref 70–99)
GLUCOSE SERPL-MCNC: 60 MG/DL (ref 70–99)
HCT VFR BLD AUTO: 33.7 % (ref 36–48)
HGB BLD-MCNC: 11.5 G/DL (ref 12–16)
LYMPHOCYTES # BLD: 1.8 K/UL (ref 1–5.1)
LYMPHOCYTES NFR BLD: 18.4 %
MCH RBC QN AUTO: 29.5 PG (ref 26–34)
MCHC RBC AUTO-ENTMCNC: 34.2 G/DL (ref 31–36)
MCV RBC AUTO: 86.3 FL (ref 80–100)
MONOCYTES # BLD: 0.8 K/UL (ref 0–1.3)
MONOCYTES NFR BLD: 8 %
NEUTROPHILS # BLD: 6.8 K/UL (ref 1.7–7.7)
NEUTROPHILS NFR BLD: 69.9 %
NT-PROBNP SERPL-MCNC: 6741 PG/ML (ref 0–124)
PERFORMED ON: ABNORMAL
PLATELET # BLD AUTO: 313 K/UL (ref 135–450)
PMV BLD AUTO: 7.8 FL (ref 5–10.5)
POTASSIUM SERPL-SCNC: 4.6 MMOL/L (ref 3.5–5.1)
PROT SERPL-MCNC: 5.3 G/DL (ref 6.4–8.2)
RBC # BLD AUTO: 3.9 M/UL (ref 4–5.2)
SODIUM SERPL-SCNC: 140 MMOL/L (ref 136–145)
TROPONIN, HIGH SENSITIVITY: 27 NG/L (ref 0–14)
TROPONIN, HIGH SENSITIVITY: 29 NG/L (ref 0–14)
WBC # BLD AUTO: 9.8 K/UL (ref 4–11)

## 2023-09-12 PROCEDURE — 84484 ASSAY OF TROPONIN QUANT: CPT

## 2023-09-12 PROCEDURE — 6360000002 HC RX W HCPCS: Performed by: PHYSICIAN ASSISTANT

## 2023-09-12 PROCEDURE — 6360000002 HC RX W HCPCS: Performed by: STUDENT IN AN ORGANIZED HEALTH CARE EDUCATION/TRAINING PROGRAM

## 2023-09-12 PROCEDURE — 2580000003 HC RX 258: Performed by: STUDENT IN AN ORGANIZED HEALTH CARE EDUCATION/TRAINING PROGRAM

## 2023-09-12 PROCEDURE — 93005 ELECTROCARDIOGRAM TRACING: CPT | Performed by: EMERGENCY MEDICINE

## 2023-09-12 PROCEDURE — 80053 COMPREHEN METABOLIC PANEL: CPT

## 2023-09-12 PROCEDURE — 6370000000 HC RX 637 (ALT 250 FOR IP): Performed by: STUDENT IN AN ORGANIZED HEALTH CARE EDUCATION/TRAINING PROGRAM

## 2023-09-12 PROCEDURE — 99285 EMERGENCY DEPT VISIT HI MDM: CPT

## 2023-09-12 PROCEDURE — 71046 X-RAY EXAM CHEST 2 VIEWS: CPT

## 2023-09-12 PROCEDURE — 96374 THER/PROPH/DIAG INJ IV PUSH: CPT

## 2023-09-12 PROCEDURE — 1200000000 HC SEMI PRIVATE

## 2023-09-12 PROCEDURE — 83880 ASSAY OF NATRIURETIC PEPTIDE: CPT

## 2023-09-12 PROCEDURE — 85025 COMPLETE CBC W/AUTO DIFF WBC: CPT

## 2023-09-12 PROCEDURE — 6370000000 HC RX 637 (ALT 250 FOR IP): Performed by: NURSE PRACTITIONER

## 2023-09-12 RX ORDER — BUSPIRONE HYDROCHLORIDE 5 MG/1
10 TABLET ORAL 2 TIMES DAILY
Status: DISCONTINUED | OUTPATIENT
Start: 2023-09-12 | End: 2023-09-23 | Stop reason: HOSPADM

## 2023-09-12 RX ORDER — SODIUM CHLORIDE 9 MG/ML
INJECTION, SOLUTION INTRAVENOUS PRN
Status: DISCONTINUED | OUTPATIENT
Start: 2023-09-12 | End: 2023-09-23 | Stop reason: HOSPADM

## 2023-09-12 RX ORDER — ONDANSETRON 2 MG/ML
4 INJECTION INTRAMUSCULAR; INTRAVENOUS EVERY 6 HOURS PRN
Status: DISCONTINUED | OUTPATIENT
Start: 2023-09-12 | End: 2023-09-23 | Stop reason: HOSPADM

## 2023-09-12 RX ORDER — SODIUM CHLORIDE 0.9 % (FLUSH) 0.9 %
5-40 SYRINGE (ML) INJECTION EVERY 12 HOURS SCHEDULED
Status: DISCONTINUED | OUTPATIENT
Start: 2023-09-12 | End: 2023-09-23 | Stop reason: HOSPADM

## 2023-09-12 RX ORDER — VIBEGRON 75 MG/1
75 TABLET, FILM COATED ORAL DAILY
COMMUNITY
Start: 2023-09-05

## 2023-09-12 RX ORDER — POTASSIUM CHLORIDE 7.45 MG/ML
10 INJECTION INTRAVENOUS PRN
Status: DISCONTINUED | OUTPATIENT
Start: 2023-09-12 | End: 2023-09-13

## 2023-09-12 RX ORDER — FUROSEMIDE 10 MG/ML
40 INJECTION INTRAMUSCULAR; INTRAVENOUS ONCE
Status: COMPLETED | OUTPATIENT
Start: 2023-09-12 | End: 2023-09-12

## 2023-09-12 RX ORDER — ATORVASTATIN CALCIUM 80 MG/1
80 TABLET, FILM COATED ORAL
Status: DISCONTINUED | OUTPATIENT
Start: 2023-09-12 | End: 2023-09-23 | Stop reason: HOSPADM

## 2023-09-12 RX ORDER — POTASSIUM CHLORIDE 750 MG/1
10 CAPSULE, EXTENDED RELEASE ORAL DAILY
Status: ON HOLD | COMMUNITY
End: 2023-09-23 | Stop reason: HOSPADM

## 2023-09-12 RX ORDER — QUETIAPINE FUMARATE 25 MG/1
12 TABLET, FILM COATED ORAL 2 TIMES DAILY
Status: ON HOLD | COMMUNITY
Start: 2023-09-09 | End: 2023-09-23 | Stop reason: HOSPADM

## 2023-09-12 RX ORDER — ACETAMINOPHEN 650 MG/1
650 SUPPOSITORY RECTAL EVERY 6 HOURS PRN
Status: DISCONTINUED | OUTPATIENT
Start: 2023-09-12 | End: 2023-09-23 | Stop reason: HOSPADM

## 2023-09-12 RX ORDER — METOPROLOL SUCCINATE 50 MG/1
200 TABLET, EXTENDED RELEASE ORAL DAILY
Status: DISCONTINUED | OUTPATIENT
Start: 2023-09-13 | End: 2023-09-13

## 2023-09-12 RX ORDER — INSULIN GLARGINE 100 [IU]/ML
20 INJECTION, SOLUTION SUBCUTANEOUS NIGHTLY
Status: DISCONTINUED | OUTPATIENT
Start: 2023-09-12 | End: 2023-09-13

## 2023-09-12 RX ORDER — DOXYCYCLINE HYCLATE 100 MG
100 TABLET ORAL 2 TIMES DAILY
Status: ON HOLD | COMMUNITY
Start: 2023-09-08 | End: 2023-09-23 | Stop reason: HOSPADM

## 2023-09-12 RX ORDER — ACETAMINOPHEN 325 MG/1
650 TABLET ORAL EVERY 6 HOURS PRN
Status: DISCONTINUED | OUTPATIENT
Start: 2023-09-12 | End: 2023-09-23 | Stop reason: HOSPADM

## 2023-09-12 RX ORDER — DEXTROSE MONOHYDRATE 100 MG/ML
INJECTION, SOLUTION INTRAVENOUS CONTINUOUS PRN
Status: DISCONTINUED | OUTPATIENT
Start: 2023-09-12 | End: 2023-09-19 | Stop reason: SDUPTHER

## 2023-09-12 RX ORDER — POLYETHYLENE GLYCOL 3350 17 G/17G
17 POWDER, FOR SOLUTION ORAL DAILY PRN
Status: DISCONTINUED | OUTPATIENT
Start: 2023-09-12 | End: 2023-09-23 | Stop reason: HOSPADM

## 2023-09-12 RX ORDER — AMLODIPINE BESYLATE 5 MG/1
10 TABLET ORAL DAILY
Status: DISCONTINUED | OUTPATIENT
Start: 2023-09-12 | End: 2023-09-13

## 2023-09-12 RX ORDER — QUETIAPINE FUMARATE 25 MG/1
12.5 TABLET, FILM COATED ORAL 2 TIMES DAILY
Status: DISCONTINUED | OUTPATIENT
Start: 2023-09-12 | End: 2023-09-23 | Stop reason: HOSPADM

## 2023-09-12 RX ORDER — SODIUM CHLORIDE 0.9 % (FLUSH) 0.9 %
5-40 SYRINGE (ML) INJECTION PRN
Status: DISCONTINUED | OUTPATIENT
Start: 2023-09-12 | End: 2023-09-23 | Stop reason: HOSPADM

## 2023-09-12 RX ORDER — CHOLECALCIFEROL (VITAMIN D3) 1250 MCG
50000 CAPSULE ORAL WEEKLY
COMMUNITY

## 2023-09-12 RX ORDER — LANOLIN ALCOHOL/MO/W.PET/CERES
6 CREAM (GRAM) TOPICAL NIGHTLY PRN
Status: DISCONTINUED | OUTPATIENT
Start: 2023-09-12 | End: 2023-09-23 | Stop reason: HOSPADM

## 2023-09-12 RX ORDER — TRAMADOL HYDROCHLORIDE 50 MG/1
50 TABLET ORAL EVERY 6 HOURS PRN
Status: DISCONTINUED | OUTPATIENT
Start: 2023-09-12 | End: 2023-09-19

## 2023-09-12 RX ORDER — ALBUTEROL SULFATE 90 UG/1
2 AEROSOL, METERED RESPIRATORY (INHALATION) 3 TIMES DAILY PRN
Status: DISCONTINUED | OUTPATIENT
Start: 2023-09-12 | End: 2023-09-23 | Stop reason: HOSPADM

## 2023-09-12 RX ORDER — ROPINIROLE 0.5 MG/1
0.5 TABLET, FILM COATED ORAL NIGHTLY
Status: DISCONTINUED | OUTPATIENT
Start: 2023-09-12 | End: 2023-09-23 | Stop reason: HOSPADM

## 2023-09-12 RX ORDER — FUROSEMIDE 40 MG/1
40 TABLET ORAL DAILY
COMMUNITY

## 2023-09-12 RX ORDER — FUROSEMIDE 10 MG/ML
40 INJECTION INTRAMUSCULAR; INTRAVENOUS 2 TIMES DAILY
Status: COMPLETED | OUTPATIENT
Start: 2023-09-12 | End: 2023-09-14

## 2023-09-12 RX ORDER — ONDANSETRON 4 MG/1
4 TABLET, ORALLY DISINTEGRATING ORAL EVERY 8 HOURS PRN
Status: DISCONTINUED | OUTPATIENT
Start: 2023-09-12 | End: 2023-09-23 | Stop reason: HOSPADM

## 2023-09-12 RX ORDER — MAGNESIUM SULFATE IN WATER 40 MG/ML
2000 INJECTION, SOLUTION INTRAVENOUS PRN
Status: DISCONTINUED | OUTPATIENT
Start: 2023-09-12 | End: 2023-09-23 | Stop reason: HOSPADM

## 2023-09-12 RX ORDER — INSULIN LISPRO 100 [IU]/ML
5 INJECTION, SOLUTION INTRAVENOUS; SUBCUTANEOUS
Status: DISCONTINUED | OUTPATIENT
Start: 2023-09-12 | End: 2023-09-19

## 2023-09-12 RX ORDER — POTASSIUM CHLORIDE 20 MEQ/1
40 TABLET, EXTENDED RELEASE ORAL PRN
Status: DISCONTINUED | OUTPATIENT
Start: 2023-09-12 | End: 2023-09-13

## 2023-09-12 RX ADMIN — APIXABAN 5 MG: 5 TABLET, FILM COATED ORAL at 21:29

## 2023-09-12 RX ADMIN — FUROSEMIDE 40 MG: 10 INJECTION, SOLUTION INTRAMUSCULAR; INTRAVENOUS at 22:43

## 2023-09-12 RX ADMIN — QUETIAPINE FUMARATE 12.5 MG: 25 TABLET ORAL at 21:27

## 2023-09-12 RX ADMIN — SODIUM CHLORIDE, PRESERVATIVE FREE 10 ML: 5 INJECTION INTRAVENOUS at 21:30

## 2023-09-12 RX ADMIN — ROPINIROLE HYDROCHLORIDE 0.5 MG: 0.5 TABLET, FILM COATED ORAL at 22:14

## 2023-09-12 RX ADMIN — INSULIN GLARGINE 20 UNITS: 100 INJECTION, SOLUTION SUBCUTANEOUS at 22:14

## 2023-09-12 RX ADMIN — Medication 6 MG: at 21:27

## 2023-09-12 RX ADMIN — FUROSEMIDE 40 MG: 10 INJECTION, SOLUTION INTRAMUSCULAR; INTRAVENOUS at 14:41

## 2023-09-12 RX ADMIN — BUSPIRONE HYDROCHLORIDE 10 MG: 5 TABLET ORAL at 21:28

## 2023-09-12 RX ADMIN — ATORVASTATIN CALCIUM 80 MG: 80 TABLET, FILM COATED ORAL at 21:27

## 2023-09-12 ASSESSMENT — PAIN - FUNCTIONAL ASSESSMENT
PAIN_FUNCTIONAL_ASSESSMENT: 0-10
PAIN_FUNCTIONAL_ASSESSMENT: 0-10

## 2023-09-12 ASSESSMENT — PAIN SCALES - GENERAL
PAINLEVEL_OUTOF10: 0

## 2023-09-12 NOTE — ED PROVIDER NOTES
I independently performed a history and physical on Urban Barthel. All diagnostic, treatment, and disposition decisions were made by myself in conjunction with the advanced practice provider/resident. For further details of Damian Bateman emergency department encounter, please see the LIN/resident's documentation. I personally saw the patient and performed a substantive portion of the visit including all aspects of the medical decision making. Briefly, this is a 66-year-old female presenting for evaluation of of shortness of breath. She was recently placed on some diuretics. She has no known history of ischemic CAD. She reports worsening shortness of breath and family has noted swelling over the past several weeks or so. She has no diagnosis of heart failure. No overt respiratory distress on exam she does have signs of volume overload, laboratory evaluation is reveal some degree of renal insufficiency with creatinine 1.8. Patient will be initiated diuresis, chest x-ray with findings concerning for pulmonary edema. Family is agreeable with plan for admission for continued cardiac work-up. EKG  The Ekg interpreted by myself in the emergency department in the absence of a cardiologist.  normal pacemaker rhythm with a rate of 71, occasional PVCs  Axis is   Normal  QTc is   404  Intervals and Durations are unremarkable. No specific ST-T wave changes appreciated. No evidence of acute ischemia. No prior EKG available for comparison      I personally saw the patient and independently provided 35 minutes of non-concurrent critical care out of the total shared critical care time provided. I, Dr. Nilo Monteiro, am the primary clinician of record. Comment: Please note this report has been produced using speech recognition software and may contain errors related to that system including errors in grammar, punctuation, and spelling, as well as words and phrases that may be inappropriate.  If there

## 2023-09-12 NOTE — H&P
pacemaker. There is no pneumothorax. 1. Congestive heart failure. 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  Authorizing Provider   B Complex Vitamins (VITAMIN B COMPLEX PO) Take by mouth    Historical Provider, MD   amLODIPine (NORVASC) 10 MG tablet Take 10 mg by mouth daily 3/27/19   Historical Provider, MD   atorvastatin (LIPITOR) 80 MG tablet  2/8/23   Historical Provider, MD   cloNIDine (CATAPRES) 0.3 MG/24HR PTWK Place 1 patch onto the skin once a week 12/27/21   Historical Provider, MD   escitalopram (LEXAPRO) 10 MG tablet Take 10 mg by mouth daily    Historical Provider, MD   melatonin 3 MG TABS tablet Take 18 mg by mouth    Historical Provider, MD   mirtazapine (REMERON) 7.5 MG tablet  1/28/23   Historical Provider, MD   rivastigmine (EXELON) 9.5 MG/24HR Place 1 patch onto the skin daily 12/27/21   Historical Provider, MD   metoprolol succinate (TOPROL XL) 200 MG extended release tablet Take 200 mg by mouth daily 1/21/21   Historical Provider, MD   insulin glargine, 1 unit dial, (TOUJEO SOLOSTAR) 300 UNIT/ML concentrated injection pen Inject 45 Units into the skin 2 times daily 12/27/21   Historical Provider, MD VANGITY 0.52 AL/3.8KR SOPN  2/4/23   Historical Provider, MD   busPIRone (BUSPAR) 10 MG tablet  2/1/23   Historical Provider, MD metFORMIN (GLUCOPHAGE) 500 MG tablet  2/6/23   Historical Provider, MD   traMADol (ULTRAM) 50 MG tablet Take 50 mg by mouth every 6 hours as needed. 1/21/21   Historical Provider, MD   insulin aspart (NOVOLOG) 100 UNIT/ML injection pen Inject 10 Units into the skin 12/27/21   Historical Provider, MD   apixaban (ELIQUIS) 5 MG TABS tablet Take 5 mg by mouth 2 times daily 9/18/18   Historical Provider, MD   acetaminophen (TYLENOL) 500 MG tablet Take 500 mg by mouth    Historical Provider, MD   gabapentin (NEURONTIN) 400 MG capsule Take 1 capsule by mouth nightly for 90 days.  3/13/18 6/11/18  OKSANA Gaspar CNP   memantine ER (NAMENDA XR) 28 MG CP24 extended release capsule Take 1 capsule by mouth daily 3/8/18   OKSANA Gaspar CNP   levETIRAcetam (KEPPRA) 500 MG tablet Take 1 tablet by mouth 2 times daily 3/8/18   OKSANA Gaspar CNP   hydrochlorothiazide (MICROZIDE) 12.5 MG capsule Take 1 capsule by mouth daily 3/8/18   OKSANA Gaspar CNP   Glucose Blood (BLOOD GLUCOSE TEST STRIPS) STRP Test 4 times daily - ICD10 - E11.9 3/8/18   OKSANA Gaspar CNP   irbesartan (AVAPRO) 300 MG tablet Take 1 tablet by mouth daily 12/14/17   OKSANA Gaspar CNP   rOPINIRole (REQUIP) 3 MG tablet Take 1 tablet by mouth 3 times daily 12/14/17   OKSANA Gaspar CNP   omeprazole (PRILOSEC) 40 MG delayed release capsule Take 1 capsule by mouth daily 12/14/17   OKSANA Gaspar CNP   budesonide-formoterol [facility]) 160-4.5 MCG/ACT AERO Inhale 2 puffs into the lungs 2 times daily 12/14/17   OKSANA Gaspar CNP   albuterol sulfate HFA (PROVENTIL HFA) 108 (90 Base) MCG/ACT inhaler Inhale 2 puffs into the lungs 3 times daily as needed for Wheezing 12/14/17   OKSANA Gaspar CNP   Blood Glucose Monitoring Suppl PO 1 kit by Does not apply route once for 1 dose 12/14/17 7/10/18  Darcella Essex

## 2023-09-12 NOTE — PROGRESS NOTES
4 Eyes Skin Assessment     NAME:  Ivan Muse  YOB: 1950  MEDICAL RECORD NUMBER:  [id number]    The patient is being assessed for  Admission    I agree that at least one RN has performed a thorough Head to Toe Skin Assessment on the patient. ALL assessment sites listed below have been assessed. Areas assessed by both nurses:    Head, Face, Ears, Shoulders, Back, Chest, Arms, Elbows, Hands, Sacrum. Buttock, Coccyx, Ischium, and Legs. Feet and Heels        Does the Patient have a Wound?  No noted wound(s)       Austin Prevention initiated by RN: Yes  Wound Care Orders initiated by RN: No    Pressure Injury (Stage 3,4, Unstageable, DTI, NWPT, and Complex wounds) if present, place Wound referral order by RN under : No    New Ostomies, if present place, Ostomy referral order under : No     Nurse 1 eSignature: Electronically signed by Yajaira Driver RN on 9/12/23 at 6:44 PM EDT    **SHARE this note so that the co-signing nurse can place an eSignature**    Nurse 2 eSignature: Electronically signed by Emmanuel Gordon LPN on 0/78/69 at 9:25 PM EDT

## 2023-09-12 NOTE — PROGRESS NOTES
Patient admitted to room 364 from ED. Patient oriented to room, call light, bed rails, phone, lights and bathroom. Patient instructed about the schedule of the day including: vital sign frequency, lab draws, possible tests, frequency of MD and staff rounds, including RN/MD rounding together at bedside, daily weights, and I &O's. Patient instructed about prescribed diet, how to use 8MENU, and television. bed alarm in place, patient aware of placement and reason. Telemetry box  in place, patient aware of placement and reason. Bed locked, in lowest position, side rails up 2/4, call light within reach. Will continue to monitor.

## 2023-09-12 NOTE — ED TRIAGE NOTES
Patient identified as a positive fall risk on the ED triage fall screening. Patient placed in fall precautions which includes:  yellow fall risk bracelet on wrist and yellow socks on feet. Patient instructed on importance of not getting out of bed or ambulating without assistance for safety.

## 2023-09-12 NOTE — PROGRESS NOTES
Daughter POA called inquired about abx for PNA md notified, per Md no signs of pna on chest xray and abx no longer indicated.

## 2023-09-12 NOTE — ED PROVIDER NOTES
[address] [place] [address]  ED  EMERGENCY DEPARTMENT ENCOUNTER        Pt Name: Junior Ahmadi  MRN: [id number]  [address] [facility] 1950  Date of evaluation: 9/12/2023  Provider: TASHA Salazar  PCP: Carline Kat MD  Note Started: 12:50 PM EDT 9/12/23       I have seen and evaluated this patient with my supervising physician Uriel Guallpa MD.      [address]       Chief Complaint   Patient presents with    Shortness of Breath     Patient comes from [facility] for shortness of breath. Patient with new onset CHF, Lasix 40mg/daily. EMS reports giving one breathing tx in route. HISTORY OF PRESENT ILLNESS: 1 or more Elements     History from : Patient, EMS, and SNF    Limitations to history : None    Junior Ahmadi is a 68 y.o. female who presents to the emergency department via EMS from [facility]. Patient presents complaining of shortness of breath. She is also complaining of bilateral leg swelling, hand swelling and face swelling. I did speak with the nursing home staff who reported swelling began on Thursday and bilateral ankles, has progressed to face and now notes. Swelling and pain began today. She does complain of shortness of breath. Diagnosed with left lower lobe pneumonia last Thursday and started on antibiotics. Her breathing did seem to improve since then. Patient does not have a history of CHF however BNP at the nursing home was elevated. She was given a breathing treatment in route by EMS which did help to alleviate some of her symptoms. She has history of A-fib on anticoagulation, diabetes, and dementia. Started on low-dose Lasix yesterday. Nursing Notes were all reviewed and agreed with or any disagreements were addressed in the HPI. REVIEW OF SYSTEMS :      Review of Systems    Positives and Pertinent negatives as per HPI.      SURGICAL HISTORY     Past Surgical History:   Procedure Laterality Date    CHOLECYSTECTOMY  1979 DISPOSITION        PATIENT REFERRED TO:  No follow-up provider specified.     DISCHARGE MEDICATIONS:  New Prescriptions    No medications on file       DISCONTINUED MEDICATIONS:  Discontinued Medications    No medications on file              (Please note that portions of this note were completed with a voice recognition program.  Efforts were made to edit the dictations but occasionally words are mis-transcribed.)    TASHA Scott (electronically signed)           TASHA Scott  09/12/23 9491

## 2023-09-13 LAB
ANION GAP SERPL CALCULATED.3IONS-SCNC: 10 MMOL/L (ref 3–16)
BUN SERPL-MCNC: 43 MG/DL (ref 7–20)
CALCIUM SERPL-MCNC: 9 MG/DL (ref 8.3–10.6)
CHLORIDE SERPL-SCNC: 101 MMOL/L (ref 99–110)
CHOLEST SERPL-MCNC: 162 MG/DL (ref 0–199)
CO2 SERPL-SCNC: 29 MMOL/L (ref 21–32)
CREAT SERPL-MCNC: 1.7 MG/DL (ref 0.6–1.2)
EKG ATRIAL RATE: 0 BPM
EKG DIAGNOSIS: NORMAL
EKG Q-T INTERVAL: 372 MS
EKG QRS DURATION: 122 MS
EKG QTC CALCULATION (BAZETT): 404 MS
EKG R AXIS: 82 DEGREES
EKG T AXIS: -89 DEGREES
EKG VENTRICULAR RATE: 71 BPM
GFR SERPLBLD CREATININE-BSD FMLA CKD-EPI: 31 ML/MIN/{1.73_M2}
GLUCOSE BLD-MCNC: 102 MG/DL (ref 70–99)
GLUCOSE BLD-MCNC: 104 MG/DL (ref 70–99)
GLUCOSE BLD-MCNC: 151 MG/DL (ref 70–99)
GLUCOSE BLD-MCNC: 364 MG/DL (ref 70–99)
GLUCOSE BLD-MCNC: 364 MG/DL (ref 70–99)
GLUCOSE BLD-MCNC: 51 MG/DL (ref 70–99)
GLUCOSE BLD-MCNC: 75 MG/DL (ref 70–99)
GLUCOSE SERPL-MCNC: 52 MG/DL (ref 70–99)
HDLC SERPL-MCNC: 78 MG/DL (ref 40–60)
LDLC SERPL CALC-MCNC: 67 MG/DL
MAGNESIUM SERPL-MCNC: 2.3 MG/DL (ref 1.8–2.4)
PERFORMED ON: ABNORMAL
PERFORMED ON: NORMAL
POTASSIUM SERPL-SCNC: 3.6 MMOL/L (ref 3.5–5.1)
PROCALCITONIN SERPL IA-MCNC: 0.11 NG/ML (ref 0–0.15)
SODIUM SERPL-SCNC: 140 MMOL/L (ref 136–145)
TRIGL SERPL-MCNC: 84 MG/DL (ref 0–150)
VLDLC SERPL CALC-MCNC: 17 MG/DL

## 2023-09-13 PROCEDURE — 6370000000 HC RX 637 (ALT 250 FOR IP): Performed by: NURSE PRACTITIONER

## 2023-09-13 PROCEDURE — 93010 ELECTROCARDIOGRAM REPORT: CPT | Performed by: INTERNAL MEDICINE

## 2023-09-13 PROCEDURE — 83735 ASSAY OF MAGNESIUM: CPT

## 2023-09-13 PROCEDURE — 97530 THERAPEUTIC ACTIVITIES: CPT

## 2023-09-13 PROCEDURE — 1200000000 HC SEMI PRIVATE

## 2023-09-13 PROCEDURE — 84145 PROCALCITONIN (PCT): CPT

## 2023-09-13 PROCEDURE — 80048 BASIC METABOLIC PNL TOTAL CA: CPT

## 2023-09-13 PROCEDURE — 36415 COLL VENOUS BLD VENIPUNCTURE: CPT

## 2023-09-13 PROCEDURE — 6370000000 HC RX 637 (ALT 250 FOR IP): Performed by: STUDENT IN AN ORGANIZED HEALTH CARE EDUCATION/TRAINING PROGRAM

## 2023-09-13 PROCEDURE — 6360000002 HC RX W HCPCS: Performed by: STUDENT IN AN ORGANIZED HEALTH CARE EDUCATION/TRAINING PROGRAM

## 2023-09-13 PROCEDURE — 93005 ELECTROCARDIOGRAM TRACING: CPT | Performed by: INTERNAL MEDICINE

## 2023-09-13 PROCEDURE — C8929 TTE W OR WO FOL WCON,DOPPLER: HCPCS

## 2023-09-13 PROCEDURE — 80061 LIPID PANEL: CPT

## 2023-09-13 PROCEDURE — 97162 PT EVAL MOD COMPLEX 30 MIN: CPT

## 2023-09-13 PROCEDURE — 99222 1ST HOSP IP/OBS MODERATE 55: CPT | Performed by: STUDENT IN AN ORGANIZED HEALTH CARE EDUCATION/TRAINING PROGRAM

## 2023-09-13 RX ORDER — HYDRALAZINE HYDROCHLORIDE 25 MG/1
25 TABLET, FILM COATED ORAL EVERY 8 HOURS SCHEDULED
Status: DISCONTINUED | OUTPATIENT
Start: 2023-09-13 | End: 2023-09-23 | Stop reason: HOSPADM

## 2023-09-13 RX ORDER — CARVEDILOL 25 MG/1
25 TABLET ORAL 2 TIMES DAILY WITH MEALS
Status: DISCONTINUED | OUTPATIENT
Start: 2023-09-13 | End: 2023-09-23 | Stop reason: HOSPADM

## 2023-09-13 RX ORDER — DOXYCYCLINE HYCLATE 100 MG
100 TABLET ORAL EVERY 12 HOURS SCHEDULED
Status: COMPLETED | OUTPATIENT
Start: 2023-09-13 | End: 2023-09-16

## 2023-09-13 RX ORDER — POTASSIUM CHLORIDE 20 MEQ/1
20 TABLET, EXTENDED RELEASE ORAL DAILY
Status: DISCONTINUED | OUTPATIENT
Start: 2023-09-13 | End: 2023-09-16

## 2023-09-13 RX ORDER — INSULIN GLARGINE 100 [IU]/ML
10 INJECTION, SOLUTION SUBCUTANEOUS NIGHTLY
Status: DISCONTINUED | OUTPATIENT
Start: 2023-09-13 | End: 2023-09-20

## 2023-09-13 RX ADMIN — METOPROLOL SUCCINATE 200 MG: 50 TABLET, EXTENDED RELEASE ORAL at 09:59

## 2023-09-13 RX ADMIN — FUROSEMIDE 40 MG: 10 INJECTION, SOLUTION INTRAMUSCULAR; INTRAVENOUS at 17:17

## 2023-09-13 RX ADMIN — APIXABAN 5 MG: 5 TABLET, FILM COATED ORAL at 21:13

## 2023-09-13 RX ADMIN — BUSPIRONE HYDROCHLORIDE 10 MG: 5 TABLET ORAL at 21:13

## 2023-09-13 RX ADMIN — BUSPIRONE HYDROCHLORIDE 10 MG: 5 TABLET ORAL at 10:00

## 2023-09-13 RX ADMIN — INSULIN LISPRO 5 UNITS: 100 INJECTION, SOLUTION INTRAVENOUS; SUBCUTANEOUS at 12:35

## 2023-09-13 RX ADMIN — Medication 6 MG: at 23:17

## 2023-09-13 RX ADMIN — POTASSIUM CHLORIDE 20 MEQ: 1500 TABLET, EXTENDED RELEASE ORAL at 15:11

## 2023-09-13 RX ADMIN — HYDRALAZINE HYDROCHLORIDE 25 MG: 25 TABLET, FILM COATED ORAL at 21:12

## 2023-09-13 RX ADMIN — APIXABAN 5 MG: 5 TABLET, FILM COATED ORAL at 10:00

## 2023-09-13 RX ADMIN — INSULIN LISPRO 5 UNITS: 100 INJECTION, SOLUTION INTRAVENOUS; SUBCUTANEOUS at 17:17

## 2023-09-13 RX ADMIN — ATORVASTATIN CALCIUM 80 MG: 80 TABLET, FILM COATED ORAL at 21:13

## 2023-09-13 RX ADMIN — FUROSEMIDE 40 MG: 10 INJECTION, SOLUTION INTRAMUSCULAR; INTRAVENOUS at 10:00

## 2023-09-13 RX ADMIN — AMLODIPINE BESYLATE 10 MG: 5 TABLET ORAL at 10:00

## 2023-09-13 RX ADMIN — HYDRALAZINE HYDROCHLORIDE 25 MG: 25 TABLET, FILM COATED ORAL at 15:11

## 2023-09-13 RX ADMIN — CARVEDILOL 25 MG: 25 TABLET, FILM COATED ORAL at 17:17

## 2023-09-13 RX ADMIN — DOXYCYCLINE HYCLATE 100 MG: 100 TABLET, COATED ORAL at 21:13

## 2023-09-13 RX ADMIN — ROPINIROLE HYDROCHLORIDE 0.5 MG: 0.5 TABLET, FILM COATED ORAL at 21:12

## 2023-09-13 RX ADMIN — QUETIAPINE FUMARATE 12.5 MG: 25 TABLET ORAL at 10:03

## 2023-09-13 RX ADMIN — QUETIAPINE FUMARATE 12.5 MG: 25 TABLET ORAL at 21:13

## 2023-09-13 NOTE — PROGRESS NOTES
Physical Therapy  Facility/Department: [facility] B3 - MED SURG  Physical Therapy Initial Assessment    Name: Raoul Oswald  : 1950  MRN: [id number]  Date of Service: 2023    Discharge Recommendations:  [address] [place] [address] with PT   PT Equipment Recommendations  Other: TBD pending progress      Patient Diagnosis(es): The primary encounter diagnosis was Acute congestive heart failure, unspecified heart failure type ([address]). A diagnosis of Shortness of breath was also pertinent to this visit. 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 Past Surgical History:  has a past surgical history that includes Cholecystectomy (). Assessment   Body Structures, Functions, Activity Limitations Requiring Skilled Therapeutic Intervention: Decreased functional mobility ; Decreased strength;Decreased safe awareness;Decreased cognition;Decreased endurance;Decreased balance;Decreased posture  Assessment: Pt is a 68year old female admitted from nursing home with SOB and acute exacerbation of HF. Pt able to complete bed mobility with min-mod A and demo's sitting balance with CGA. Pt unwilling to attempt functional transfers this session d/t fatigue and reports of dizziness when seated (although pt unable to sit long enough to obtain BP reading). Session overall limited by pt dementia.  Unclear as to pt's PLOF as pt unable to provide information d/t dementia, and no family present at time of eval. Per chart review/case management note, pt was at [facility] [address] prior to admission and was able to with baseline dementia)   []Medical Status   []Readiness to learn  []Refusal   []Language barrier  []Decreased vision/hearing    []No family present     Should this change during treatment, education will be initiated. If family/ pt's support system is present at subsequent therapy session, will attempt to initiate education. AM-PAC Score  AM-PAC Inpatient Mobility Raw Score : 13 (09/13/23 1138)  AM-PAC Inpatient T-Scale Score : 36.74 (09/13/23 1138)  Mobility Inpatient CMS 0-100% Score: 64.91 (09/13/23 1138)  Mobility Inpatient CMS G-Code Modifier : CL (09/13/23 1138)          Goals  Short Term Goals  Time Frame for Short Term Goals: 1 week- 9/20/23  Short Term Goal 1: Pt will complete bed mobility with CGA  Short Term Goal 2: Pt will tolerate sitting EOB x 5 mins with SBA for balance  Short Term Goal 3: Pt will complete functional transfers with min A  Short Term Goal 4: Pt will participate in 10-15 reps BLE exercises for strengthening by 9/18/23  Short Term Goal 5: Pt will tolerate gait assessment and appropriate updated goal to be set  Patient Goals   Patient Goals : none stated d/t pt cognition       Education  Patient Education  Education Given To: Patient  Education Provided: Role of Therapy;Plan of Care;Transfer Training; Fall Prevention Strategies  Education Method: Demonstration;Verbal  Barriers to Learning: Cognition  Education Outcome: Verbalized understanding;Continued education needed      Therapy Time   Individual Concurrent Group Co-treatment   Time In 0839         Time Out 0912         Minutes 33         Timed Code Treatment Minutes: 23 Minutes (10 min eval)       Promise Greenfield, PT, DPT       If pt is unable to be seen after this session, please let this note serve as discharge summary. Please see case management note for discharge disposition. Thank you.

## 2023-09-13 NOTE — PLAN OF CARE
Patient's EF (Ejection Fraction) is  40%    Heart Failure Medications:  Diuretics[de-identified] Furosemide    (One of the following REQUIRED for EF </= 40%/SYSTOLIC FAILURE but MAY be used in EF% >40%/DIASTOLIC FAILURE)        ACE[de-identified] None        ARB[de-identified] None         ARNI[de-identified] None    (Beta Blockers)  NON- Evidenced Based Beta Blocker (for EF% >40%/DIASTOLIC FAILURE): None    Evidenced Based Beta Blocker::(REQUIRED for EF% <40%/SYSTOLIC FAILURE) Metoprolol SUCCinate- Toprol XL  . .................................................................................................................................................. Healthy Weight Tracking - BMI + Meds 7/10/2018 3/24/2022 8/18/2022 2/8/2023 9/12/2023 9/12/2023 9/13/2023   Weight 192 lb 210 lb - 210 lb 210 lb 210 lb 175 lb 9.6 oz   Height 5' 2.5\" 5' 3\" 5' 4\" 5' 4\" - 5' 3\" -   Body Mass Index 34.55 kg/m2 37.2 kg/m2 - 36.04 kg/m2 - 37.2 kg/m2 31.11 kg/m2   Some recent data might be hidden         Patient's weights and intake/output reviewed: Yes    Daily Weight log at bedside, patient/family participation in use of log: \"yes    Patient's Last Weight: 175 lbs obtained by standing scale. Difference of 35 lbs less than last documented weight. Intake/Output Summary (Last 24 hours) at 9/13/2023 0617  Last data filed at 9/13/2023 3567  Gross per 24 hour   Intake 240 ml   Output 2400 ml   Net -2160 ml       Education Booklet Provided: yes    Comorbidities Reviewed Yes    Patient has a past medical history of Amenorrhea, Anxiety, Carpal tunnel syndrome of right wrist, Depression, Fibromyalgia, Gastritis, HTN (hypertension), Hypertension, Migraines, Narcolepsy without cataplexy(347.00), Overweight(278.02), Restless legs syndrome, TIA (transient ischemic attack), and Type II or unspecified type diabetes mellitus without mention of complication, not stated as uncontrolled.      >>For CHF and Comorbidity documentation on Education Time and Topics, please see Education

## 2023-09-13 NOTE — CARE COORDINATION
Case Management Assessment  Initial Evaluation    Date/Time of Evaluation: 9/13/2023 10:47 AM  Assessment Completed by: Kyleigh Cristobal RN    If patient is discharged prior to next notation, then this note serves as note for discharge by case management. Patient Name: Sourav Gilliam                   YOB: 1950  Diagnosis: Shortness of breath [R06.02]  CHF (congestive heart failure), NYHA class I, acute on chronic, combined ([address]) [I50.43]  Acute congestive heart failure, unspecified heart failure type ([address]) [I50.9]                   Date / Time: 9/12/2023 12:39 PM    Patient Admission Status: Inpatient   Readmission Risk (Low < 19, Mod (19-27), High > 27): Readmission Risk Score: 16.1    Current PCP: Lea Cordoba MD  PCP verified by CM? Chart Reviewed: Yes      History Provided by:    Patient Orientation:      Patient Cognition:      Hospitalization in the last 30 days (Readmission):  No    If yes, Readmission Assessment in  Navigator will be completed. Advance Directives:      Code Status: Full Code   Patient's Primary Decision Maker is:        Discharge Planning:    Patient lives with: Alone Type of Home: Long-Term Care  Primary Care Giver:    Patient Support Systems include: Family Members   Current Financial resources:    Current community resources:    Current services prior to admission: [address]            Current DME:              Type of Home Care services:  PT, OT, Aide Services    ADLS  Prior functional level:    Current functional level:      PT AM-PAC:   /24  OT AM-PAC:   /24    Family can provide assistance at DC: Would you like Case Management to discuss the discharge plan with any other family members/significant others, and if so, who?     Plans to Return to Present Housing:    Other Identified Issues/Barriers to RETURNING to current housing:   Potential Assistance needed at discharge: [address]            Potential DME:

## 2023-09-13 NOTE — PROGRESS NOTES
Shift assessment completed and charted. A&Ox2. Bed alarm on. Check and change. Purewick in place. Bed locked and in lowest position. Call light within reach. Pt denies any other needs at this time. Will continue to monitor.

## 2023-09-13 NOTE — PLAN OF CARE
Patient's EF (Ejection Fraction) is greater than 40%    Heart Failure Medications:  Diuretics[de-identified] Furosemide    (One of the following REQUIRED for EF </= 40%/SYSTOLIC FAILURE but MAY be used in EF% >40%/DIASTOLIC FAILURE)        ACE[de-identified] None        ARB[de-identified] None         ARNI[de-identified] None    (Beta Blockers)  NON- Evidenced Based Beta Blocker (for EF% >40%/DIASTOLIC FAILURE): None    Evidenced Based Beta Blocker::(REQUIRED for EF% <40%/SYSTOLIC FAILURE) Metoprolol SUCCinate- Toprol XL  . .................................................................................................................................................. Healthy Weight Tracking - BMI + Meds 7/10/2018 3/24/2022 8/18/2022 2/8/2023 9/12/2023 9/12/2023 9/13/2023   Weight 192 lb 210 lb - 210 lb 210 lb 210 lb 175 lb 9.6 oz   Height 5' 2.5\" 5' 3\" 5' 4\" 5' 4\" - 5' 3\" -   Body Mass Index 34.55 kg/m2 37.2 kg/m2 - 36.04 kg/m2 - 37.2 kg/m2 31.11 kg/m2   Some recent data might be hidden         Patient's weights and intake/output reviewed: Yes    Daily Weight log at bedside, patient/family participation in use of log: \"yes    Patient's Last Weight: 175 lb 9.2 oz     obtained by bed scale. Admission weight. Intake/Output Summary (Last 24 hours) at 9/13/2023 1128  Last data filed at 9/13/2023 8096  Gross per 24 hour   Intake 480 ml   Output 2900 ml   Net -2420 ml       Education Booklet Provided: yes    Comorbidities Reviewed Yes    Patient has a past medical history of Amenorrhea, Anxiety, Carpal tunnel syndrome of right wrist, Depression, Fibromyalgia, Gastritis, HTN (hypertension), Hypertension, Migraines, Narcolepsy without cataplexy(347.00), Overweight(278.02), Restless legs syndrome, TIA (transient ischemic attack), and Type II or unspecified type diabetes mellitus without mention of complication, not stated as uncontrolled.      >>For CHF and Comorbidity documentation on Education Time and Topics, please see Education Tab    Progressive Mobility

## 2023-09-13 NOTE — PROGRESS NOTES
4 Eyes Skin Assessment     NAME:  Zully Rapp  YOB: 1950  MEDICAL RECORD NUMBER:  [id number]    The patient is being assessed for  Admission    I agree that at least one RN has performed a thorough Head to Toe Skin Assessment on the patient. ALL assessment sites listed below have been assessed. Areas assessed by both nurses:    Head, Face, Ears, Shoulders, Back, Chest, Arms, Elbows, Hands, Sacrum. Buttock, Coccyx, Ischium, Legs. Feet and Heels, and Under Medical Devices         Does the Patient have a Wound?  No noted wound(s)       [place] Prevention initiated by RN: No  Wound Care Orders initiated by RN: No    Pressure Injury (Stage 3,4, Unstageable, DTI, NWPT, and Complex wounds) if present, place Wound referral order by RN under : No    New Ostomies, if present place, Ostomy referral order under : No     Nurse 1 eSignature: Electronically signed by Cecilio Herrera RN on 9/13/23 at 6:04 AM EDT    **SHARE this note so that the co-signing nurse can place an eSignature**    Nurse 2 eSignature: Electronically signed by Porsha Conde RN on 9/13/23 at 6:05 AM EDT

## 2023-09-13 NOTE — PROGRESS NOTES
Hospital Medicine Progress Note      Date of Admission: 9/12/2023  Hospital Day: 2    Chief Admission Complaint:    Chief Complaint   Patient presents with    Shortness of Breath     Patient comes from [facility] for shortness of breath. Patient with new onset CHF, Lasix 40mg/daily. EMS reports giving one breathing tx in route. Subjective:      Pt seen, also with cardiology, at bedside. She tells us she is feeling better today. She is overall a poor historian due to her advanced dementia. She does have some dyspnea while lying down speaking to us, also c/o of orthopnea. Discussed plan of care with the patient's daughter, Antonio Leiva, over the phone today. Confirmed FULL CODE status with her. Presenting Admission History: This is a 77-year-old female with past medical history of hypertension, hyperlipidemia, type 2 diabetes mellitus, HFrEF, dementia, DVT, restless leg syndrome who presented to the emergency department with increasing shortness of breath. Patient poor historian due to dementia. According to the daughter and nursing home facility patient has been having increasing shortness of breath as well as lower extremity edema. Patient was started on antibiotics last week did have some improvement but due to edema she was started on Lasix a couple days ago. Despite treatment patient still had shortness of breath and edema prompting emergency room evaluation. At the time of evaluation patient remains on room air saturating above 92%. She is hemodynamically stable. Initial labs revealing elevated creatinine at 1.8, elevated BNP. Chest x-ray with vascular congestion. 09/13: Discussed plan of care with the patient's daughter, Antonio Leiva, over the phone today. Confirmed FULL CODE status with her. Assessment/Plan:       Acute on chronic exacerbation systolic CHF.  POA: dyspnea, lower extremity edema  - Echocardiogram from 2018 on [facility] with LVEF of 40-45% with mildly Pending   [x]Seen with Recommendations for:  [x]Long term care independently  []Home w/ assist  []HHC  []SNF  []Acute Rehab    Anticipated Discharge Day/Date:  3-4 days     Anticipated Discharge Location: []Home  []HHC  []SNF  []Acute Rehab  []ECF  []LTAC  []Hospice  []Other -  long term care    Consults:      IP CONSULT TO HEART FAILURE NURSE/COORDINATOR  IP CONSULT TO DIETITIAN      This patient has a high likelihood of being discharged tomorrow and is appropriate for A1 Discharge Unit in AM pending clinical course overnight: []Yes  [x]No    ------------------------------------------------------------------------------------------------------------------------------------------------------------------------    MDM    [x] High (any 2)    A. Problems (any 1)  [] Acute/Chronic Illness/injury posing threat to life or bodily function:  chf exac  [x] Severe exacerbation of chronic illness:  chf exac  ---------------------------------------------------------------------  B. Risk of Treatment (any 1)   [x] Drugs/treatments that require intensive monitoring for toxicity include:  iv lasix  [] Change in code status:    [] Decision to escalate care:    [] Major surgery/procedure with associated risk factors:    ----------------------------------------------------------------------  C. Data (any 2)  [x] Discussed management of the case with:  cm, cardio, nephro  [x] Imaging personally reviewed and interpreted, includes:  cxr  [x] Data Review (any 3)  [x] Collateral history obtained from:  pt, her daughter,   [x] All available Consultant notes from yesterday/today were reviewed  [x] All current labs were reviewed and interpreted for clinical significance   [x] Appropriate follow-up labs were ordered    Medications:  Personally reviewed in detail in conjunction w/ labs as documented for evidence of drug toxicity.      Infusion Medications    sodium chloride      dextrose       Scheduled Medications    amLODIPine  10 mg Oral

## 2023-09-13 NOTE — CONSULTS
Comprehensive Nutrition Assessment    Type and Reason for Visit:  Initial, Consult    Nutrition Recommendations/Plan:   Modify to CC4 STEWART diet  Initiate glucerna BID - monitor for acceptance  1800 ml FR  Monitor nutrition adequacy, pertinent labs, bowel habits, wt changes, and clinical progress     Malnutrition Assessment:  Malnutrition Status: At risk for malnutrition (Comment) (09/13/23 1306)    Context:  Acute Illness     Findings of the 6 clinical characteristics of malnutrition:  Energy Intake:  Mild decrease in energy intake (Comment)  Fluid Accumulation:  Mild Extremities    Nutrition Assessment:    Initial: 69 yo F w PMH HTN, DM, dementia, CHF admitted w CHF. Currently on cardiac 2g Na diet w 1800 ml FR. PO intakes % per EMR. Limited/unspecified wt hx per EMR, difficult to assess wt changes. Pt from nursing home and sleeping at time of visit, not appropriate for diet education. Recommend modifying diet to carb control d/t elevated BG levels and adding ONS to promote po intakes. Will monitor. Nutrition Related Findings:    BG  x24 hrs. +2 pitting BLE edema. Nonpitting BUE edema. Wound Type: None       Current Nutrition Intake & Therapies:    Average Meal Intake: 51-75%, %  Average Supplements Intake: None Ordered  ADULT DIET; Regular; Low Fat/Low Chol/High Fiber/STEWART; Low Sodium (2 gm); 1800 ml    Anthropometric Measures:  Height: 5' 3\" (160 cm)  Ideal Body Weight (IBW): 115 lbs (52 kg)       Current Body Weight: 175 lb 11.3 oz (79.7 kg),   IBW. Weight Source: Standing Scale  Current BMI (kg/m2): 31.1        Weight Adjustment For: No Adjustment                 BMI Categories: Obese Class 1 (BMI 30.0-34. 9)    Estimated Daily Nutrient Needs:  Energy Requirements Based On: Kcal/kg (25-30)  Weight Used for Energy Requirements: Ideal  Energy (kcal/day): 7553-0490 kcals  Weight Used for Protein Requirements: Ideal (1-1.2)  Protein (g/day): 52-62 g  Method Used for Fluid Requirements: Other

## 2023-09-13 NOTE — CONSULTS
40%       stenosis. 8.  Right coronary: Medium-sized. Mild diffuse disease. 9.  Right posterior descending: Mid-vessel lesion: There is a 40%       stenosis. 10. LVEDP 17 mmHg. Impression and Plan:      Acute on chronic HFrEF  Elevated Troponin   Hx of SSS s/p dual chamber PPM in 9/2018   -hx of prolonged sinus pauses  Coronary Artery Disease, diffuse non-obstructive CAD on LHC 2018  Essential HTN, uncontrolled    --160s since admission   CKD IV? PAF on eliquis   Hx of CVA  Advanced Dementia   Type 2 DM    PTA cardiac meds: hctz 12.5mg qd, Toprol 200mg, irbesartan 300mg daily, amlodipine 10mg, lipitor 80mg   Pertinent labs:   ProBNP elevated 6741  Troponin elevated 27-29  Scr 1.8 on admission, 1.7 today; (Cr 1.6-1.8, labs 2022). LFTs normal    Plan  -Patient presenting with acute decompensated HFrEF. She is warm/wet on exam and appears comfortable. Conversationally dyspneic, however. Her last echocardiogram in 2018 per the report at OSH, demonstrated an EF of 40% with global hypokinesis. She underwent LHC around that time which showed moderate, non obstructive CAD. -Mild Troponin (negative delta) 2/2 HF/CKD; no concern for ACS. -Repeat echocardiogram  -She is net negative 2.4L since admission (received IV lasix 40mg in ED and this AM); continue IV lasix 40mg BID. If robust response, we may need to once daily. Goal around -2L in the next 24h. -Agree daily potassium supplementation with diuresis as ordered by primary; K goal 4.0  -Check magnesium level; goal of Mg 2.0  -Suspect patient has underlying CKD rather than THELMA. Her baseline Cr on labs at OSH in 2022 (1.6-1.8). Recommend nephrology consultation for assistance during diuresis in patient with suspected advanced CKD. -Frequent PVCs and occasional ventricular bigeminy noted on telemetry since admission; however, since diuresis PVC frequency is decreasing.  Suspect PVC frequency 2/2 decompensated HF.   -HF GDMT: Change toprol (200mg) to coreg 25mg BID as BP is uncontrolled (Starting tomorrow; received toprol this AM). Avoid ACEi/Arb/arni/MRA. Stop HCTZ/CCB. Await nephrology recommendations for acei/arb/arni. Kourtney Sapp.   -HF education; but may be difficult 2/2 advanced dementia.   -Continue eliquis for [facility] for PAF    I will address the patient's cardiac risk factors and adjusted pharmacologic treatment as needed. In addition, I have reinforced the need for patient directed risk factor modification. All questions and concerns were addressed to the patient/family. Alternatives to my treatment were discussed. Thank you for allowing us to participate in the care of Kellie Rodrigues. Please call me with any questions [phone].     Larisa Mendosa, DO   Cardiovascular Disease  [address] [place] [address]  [phone] [facility]  [phone] [address] [facility]  9/13/2023 11:23 AM

## 2023-09-14 ENCOUNTER — APPOINTMENT (OUTPATIENT)
Dept: ULTRASOUND IMAGING | Age: 73
DRG: 291 | End: 2023-09-14
Payer: MEDICARE

## 2023-09-14 PROBLEM — I50.9 ACUTE CONGESTIVE HEART FAILURE (HCC): Status: ACTIVE | Noted: 2023-09-14

## 2023-09-14 PROBLEM — I49.3 PVC'S (PREMATURE VENTRICULAR CONTRACTIONS): Status: ACTIVE | Noted: 2023-09-14

## 2023-09-14 PROBLEM — I25.10 CORONARY ARTERY DISEASE INVOLVING NATIVE CORONARY ARTERY OF NATIVE HEART WITHOUT ANGINA PECTORIS: Status: ACTIVE | Noted: 2023-09-14

## 2023-09-14 PROBLEM — N18.4 STAGE 4 CHRONIC KIDNEY DISEASE (HCC): Status: ACTIVE | Noted: 2023-09-14

## 2023-09-14 PROBLEM — Z95.0 PACEMAKER: Status: ACTIVE | Noted: 2023-09-14

## 2023-09-14 PROBLEM — I48.0 PAF (PAROXYSMAL ATRIAL FIBRILLATION) (HCC): Status: ACTIVE | Noted: 2023-09-14

## 2023-09-14 LAB
ANION GAP SERPL CALCULATED.3IONS-SCNC: 8 MMOL/L (ref 3–16)
BACTERIA URNS QL MICRO: ABNORMAL /HPF
BILIRUB UR QL STRIP.AUTO: NEGATIVE
BUN SERPL-MCNC: 42 MG/DL (ref 7–20)
CALCIUM SERPL-MCNC: 8.6 MG/DL (ref 8.3–10.6)
CHLORIDE SERPL-SCNC: 105 MMOL/L (ref 99–110)
CLARITY UR: CLEAR
CO2 SERPL-SCNC: 30 MMOL/L (ref 21–32)
COLOR UR: YELLOW
CREAT SERPL-MCNC: 1.7 MG/DL (ref 0.6–1.2)
CREAT UR-MCNC: 29.6 MG/DL (ref 28–259)
EKG ATRIAL RATE: 84 BPM
EKG DIAGNOSIS: NORMAL
EKG Q-T INTERVAL: 396 MS
EKG QRS DURATION: 80 MS
EKG QTC CALCULATION (BAZETT): 467 MS
EKG R AXIS: 13 DEGREES
EKG T AXIS: 93 DEGREES
EKG VENTRICULAR RATE: 84 BPM
EPI CELLS #/AREA URNS HPF: ABNORMAL /HPF (ref 0–5)
GFR SERPLBLD CREATININE-BSD FMLA CKD-EPI: 31 ML/MIN/{1.73_M2}
GLUCOSE BLD-MCNC: 137 MG/DL (ref 70–99)
GLUCOSE BLD-MCNC: 256 MG/DL (ref 70–99)
GLUCOSE BLD-MCNC: 312 MG/DL (ref 70–99)
GLUCOSE BLD-MCNC: 87 MG/DL (ref 70–99)
GLUCOSE SERPL-MCNC: 106 MG/DL (ref 70–99)
GLUCOSE UR STRIP.AUTO-MCNC: 100 MG/DL
HGB UR QL STRIP.AUTO: NEGATIVE
KETONES UR STRIP.AUTO-MCNC: NEGATIVE MG/DL
LEUKOCYTE ESTERASE UR QL STRIP.AUTO: NEGATIVE
MAGNESIUM SERPL-MCNC: 2.1 MG/DL (ref 1.8–2.4)
NITRITE UR QL STRIP.AUTO: NEGATIVE
PERFORMED ON: ABNORMAL
PERFORMED ON: NORMAL
PH UR STRIP.AUTO: 7 [PH] (ref 5–8)
POTASSIUM SERPL-SCNC: 4.2 MMOL/L (ref 3.5–5.1)
PROT UR STRIP.AUTO-MCNC: 100 MG/DL
PROT UR-MCNC: 161 MG/DL
PROT/CREAT UR-RTO: 5.4 MG/DL
RBC #/AREA URNS HPF: ABNORMAL /HPF (ref 0–4)
SODIUM SERPL-SCNC: 143 MMOL/L (ref 136–145)
SP GR UR STRIP.AUTO: 1.02 (ref 1–1.03)
TSH SERPL DL<=0.005 MIU/L-ACNC: 2 UIU/ML (ref 0.27–4.2)
UA DIPSTICK W REFLEX MICRO PNL UR: YES
URN SPEC COLLECT METH UR: ABNORMAL
UROBILINOGEN UR STRIP-ACNC: 0.2 E.U./DL
WBC #/AREA URNS HPF: ABNORMAL /HPF (ref 0–5)

## 2023-09-14 PROCEDURE — 2580000003 HC RX 258

## 2023-09-14 PROCEDURE — 6360000002 HC RX W HCPCS: Performed by: NURSE PRACTITIONER

## 2023-09-14 PROCEDURE — 6360000002 HC RX W HCPCS: Performed by: STUDENT IN AN ORGANIZED HEALTH CARE EDUCATION/TRAINING PROGRAM

## 2023-09-14 PROCEDURE — 93010 ELECTROCARDIOGRAM REPORT: CPT | Performed by: INTERNAL MEDICINE

## 2023-09-14 PROCEDURE — 2580000003 HC RX 258: Performed by: STUDENT IN AN ORGANIZED HEALTH CARE EDUCATION/TRAINING PROGRAM

## 2023-09-14 PROCEDURE — 82570 ASSAY OF URINE CREATININE: CPT

## 2023-09-14 PROCEDURE — 6370000000 HC RX 637 (ALT 250 FOR IP): Performed by: NURSE PRACTITIONER

## 2023-09-14 PROCEDURE — 6370000000 HC RX 637 (ALT 250 FOR IP): Performed by: STUDENT IN AN ORGANIZED HEALTH CARE EDUCATION/TRAINING PROGRAM

## 2023-09-14 PROCEDURE — 1200000000 HC SEMI PRIVATE

## 2023-09-14 PROCEDURE — 83735 ASSAY OF MAGNESIUM: CPT

## 2023-09-14 PROCEDURE — 81001 URINALYSIS AUTO W/SCOPE: CPT

## 2023-09-14 PROCEDURE — 84443 ASSAY THYROID STIM HORMONE: CPT

## 2023-09-14 PROCEDURE — 76770 US EXAM ABDO BACK WALL COMP: CPT

## 2023-09-14 PROCEDURE — 84156 ASSAY OF PROTEIN URINE: CPT

## 2023-09-14 PROCEDURE — 99233 SBSQ HOSP IP/OBS HIGH 50: CPT | Performed by: NURSE PRACTITIONER

## 2023-09-14 PROCEDURE — 80048 BASIC METABOLIC PNL TOTAL CA: CPT

## 2023-09-14 RX ORDER — WATER 1000 ML/1000ML
INJECTION, SOLUTION INTRAVENOUS
Status: COMPLETED
Start: 2023-09-14 | End: 2023-09-14

## 2023-09-14 RX ORDER — ZIPRASIDONE MESYLATE 20 MG/ML
10 INJECTION, POWDER, LYOPHILIZED, FOR SOLUTION INTRAMUSCULAR ONCE
Status: COMPLETED | OUTPATIENT
Start: 2023-09-14 | End: 2023-09-14

## 2023-09-14 RX ORDER — TORSEMIDE 20 MG/1
20 TABLET ORAL DAILY
Status: DISCONTINUED | OUTPATIENT
Start: 2023-09-15 | End: 2023-09-16

## 2023-09-14 RX ADMIN — FUROSEMIDE 40 MG: 10 INJECTION, SOLUTION INTRAMUSCULAR; INTRAVENOUS at 11:11

## 2023-09-14 RX ADMIN — ATORVASTATIN CALCIUM 80 MG: 80 TABLET, FILM COATED ORAL at 20:28

## 2023-09-14 RX ADMIN — WATER 1.2 ML: 1 INJECTION INTRAMUSCULAR; INTRAVENOUS; SUBCUTANEOUS at 02:55

## 2023-09-14 RX ADMIN — INSULIN LISPRO 5 UNITS: 100 INJECTION, SOLUTION INTRAVENOUS; SUBCUTANEOUS at 14:03

## 2023-09-14 RX ADMIN — CARVEDILOL 25 MG: 25 TABLET, FILM COATED ORAL at 17:29

## 2023-09-14 RX ADMIN — INSULIN GLARGINE 10 UNITS: 100 INJECTION, SOLUTION SUBCUTANEOUS at 20:28

## 2023-09-14 RX ADMIN — HYDRALAZINE HYDROCHLORIDE 25 MG: 25 TABLET, FILM COATED ORAL at 04:49

## 2023-09-14 RX ADMIN — DOXYCYCLINE HYCLATE 100 MG: 100 TABLET, COATED ORAL at 11:08

## 2023-09-14 RX ADMIN — INSULIN LISPRO 5 UNITS: 100 INJECTION, SOLUTION INTRAVENOUS; SUBCUTANEOUS at 18:41

## 2023-09-14 RX ADMIN — CARVEDILOL 25 MG: 25 TABLET, FILM COATED ORAL at 11:08

## 2023-09-14 RX ADMIN — FUROSEMIDE 40 MG: 10 INJECTION, SOLUTION INTRAMUSCULAR; INTRAVENOUS at 17:29

## 2023-09-14 RX ADMIN — DOXYCYCLINE HYCLATE 100 MG: 100 TABLET, COATED ORAL at 20:29

## 2023-09-14 RX ADMIN — SODIUM CHLORIDE, PRESERVATIVE FREE 10 ML: 5 INJECTION INTRAVENOUS at 11:10

## 2023-09-14 RX ADMIN — QUETIAPINE FUMARATE 12.5 MG: 25 TABLET ORAL at 11:08

## 2023-09-14 RX ADMIN — POTASSIUM CHLORIDE 20 MEQ: 1500 TABLET, EXTENDED RELEASE ORAL at 11:08

## 2023-09-14 RX ADMIN — ROPINIROLE HYDROCHLORIDE 0.5 MG: 0.5 TABLET, FILM COATED ORAL at 20:28

## 2023-09-14 RX ADMIN — ACETAMINOPHEN 650 MG: 325 TABLET ORAL at 04:53

## 2023-09-14 RX ADMIN — APIXABAN 5 MG: 5 TABLET, FILM COATED ORAL at 20:28

## 2023-09-14 RX ADMIN — APIXABAN 5 MG: 5 TABLET, FILM COATED ORAL at 11:08

## 2023-09-14 RX ADMIN — ZIPRASIDONE MESYLATE 10 MG: 20 INJECTION, POWDER, LYOPHILIZED, FOR SOLUTION INTRAMUSCULAR at 02:55

## 2023-09-14 RX ADMIN — BUSPIRONE HYDROCHLORIDE 10 MG: 5 TABLET ORAL at 11:08

## 2023-09-14 RX ADMIN — HYDRALAZINE HYDROCHLORIDE 25 MG: 25 TABLET, FILM COATED ORAL at 20:28

## 2023-09-14 RX ADMIN — HYDRALAZINE HYDROCHLORIDE 25 MG: 25 TABLET, FILM COATED ORAL at 14:03

## 2023-09-14 RX ADMIN — SODIUM CHLORIDE, PRESERVATIVE FREE 10 ML: 5 INJECTION INTRAVENOUS at 20:29

## 2023-09-14 RX ADMIN — Medication 6 MG: at 20:29

## 2023-09-14 RX ADMIN — QUETIAPINE FUMARATE 12.5 MG: 25 TABLET ORAL at 20:28

## 2023-09-14 RX ADMIN — BUSPIRONE HYDROCHLORIDE 10 MG: 5 TABLET ORAL at 20:29

## 2023-09-14 ASSESSMENT — PAIN DESCRIPTION - ORIENTATION: ORIENTATION: RIGHT;LEFT

## 2023-09-14 ASSESSMENT — PAIN DESCRIPTION - DESCRIPTORS: DESCRIPTORS: ACHING

## 2023-09-14 ASSESSMENT — PAIN SCALES - GENERAL
PAINLEVEL_OUTOF10: 6
PAINLEVEL_OUTOF10: 4

## 2023-09-14 ASSESSMENT — PAIN DESCRIPTION - LOCATION: LOCATION: ARM;LEG

## 2023-09-14 NOTE — CARE COORDINATION
Chart reviewed day 2. Care provided by nephro, cards, and IM. Pt is from LTC at [facility]. She mostly uses a wheelchair but can stand to pivot to chair and toilet per her daughter. Pt on IV lasix, with 2300 diuresed yesterday. Pt had a rapid response called for irregular heartbeat between 20's-100's. Plan is for pt to return to [facility] when D/George. Continue to follow course for needs.  Morteza Florez RN

## 2023-09-14 NOTE — PLAN OF CARE
Patient's EF (Ejection Fraction) is greater than 40%    Heart Failure Medications:  Diuretics[de-identified] Furosemide    (One of the following REQUIRED for EF </= 40%/SYSTOLIC FAILURE but MAY be used in EF% >40%/DIASTOLIC FAILURE)        ACE[de-identified] None        ARB[de-identified] None         ARNI[de-identified] None    (Beta Blockers)  NON- Evidenced Based Beta Blocker (for EF% >40%/DIASTOLIC FAILURE): Other    Evidenced Based Beta Blocker::(REQUIRED for EF% <40%/SYSTOLIC FAILURE) Metoprolol SUCCinate- Toprol XL  . .................................................................................................................................................. Healthy Weight Tracking - BMI + Meds 3/24/2022 8/18/2022 2/8/2023 9/12/2023 9/12/2023 9/13/2023 9/14/2023   Weight 210 lb - 210 lb 210 lb 210 lb 175 lb 9.6 oz 174 lb 3.2 oz   Height 5' 3\" 5' 4\" 5' 4\" - 5' 3\" - -   Body Mass Index 37.2 kg/m2 - 36.04 kg/m2 - 37.2 kg/m2 31.11 kg/m2 30.86 kg/m2   Some recent data might be hidden         Patient's weights and intake/output reviewed: Yes    Daily Weight log at bedside, patient/family participation in use of log: \"yes    Patient's Last Weight: 174 lbs obtained by standing scale. Difference of 1 lbs less than last documented weight. 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      >>For CHF and Comorbidity documentation

## 2023-09-14 NOTE — PLAN OF CARE
For patient safety, an avasure camera has been placed in patient's room. AVASURE monitoring needed for Confusion, Increased Fall Risk, Pulling at Lines, and Delirium. Writer placed call to monitor observer to verify camera  and review patient name, reason for monitoring, and contact information for primary RN. Patient notified of use of remote monitoring upon implementation of camera and verbalized understanding.

## 2023-09-14 NOTE — PROGRESS NOTES
detail in conjunction w/ labs as documented for evidence of drug toxicity. Infusion Medications    sodium chloride      dextrose       Scheduled Medications    potassium chloride  20 mEq Oral Daily    carvedilol  25 mg Oral BID WC    hydrALAZINE  25 mg Oral 3 times per day    insulin glargine  10 Units SubCUTAneous Nightly    doxycycline hyclate  100 mg Oral 2 times per day    apixaban  5 mg Oral BID    atorvastatin  80 mg Oral QHS    busPIRone  10 mg Oral BID    insulin lispro  5 Units SubCUTAneous TID WC    QUEtiapine  12.5 mg Oral BID    sodium chloride flush  5-40 mL IntraVENous 2 times per day    furosemide  40 mg IntraVENous BID    rOPINIRole  0.5 mg Oral Nightly     PRN Meds: albuterol sulfate HFA, melatonin, traMADol, sodium chloride flush, sodium chloride, ondansetron **OR** ondansetron, polyethylene glycol, acetaminophen **OR** acetaminophen, magnesium sulfate, perflutren lipid microspheres, glucose, dextrose bolus **OR** dextrose bolus, glucagon (rDNA), dextrose     Labs:  Personally reviewed and interpreted for clinical significance. Recent Labs     09/12/23  1255   WBC 9.8   HGB 11.5*   HCT 33.7*          Recent Labs     09/12/23  1255 09/13/23  0803 09/14/23  0630    140 143   K 4.6 3.6 4.2    101 105   CO2 28 29 30   BUN 46* 43* 42*   CREATININE 1.8* 1.7* 1.7*   CALCIUM 8.8 9.0 8.6   MG  --  2.30 2.10       Recent Labs     09/12/23  1255 09/12/23  1453   PROBNP 6,741*  --    TROPHS 29* 27*       No results for input(s): \"LABA1C\" in the last 72 hours. Recent Labs     09/12/23  1255   AST 18   ALT 27   BILITOT 0.3   ALKPHOS 88       No results for input(s): \"INR\", \"LACTA\", \"TSH\" in the last 72 hours. Urine Cultures:   Lab Results   Component Value Date/Time    LABURIN  07/20/2018 10:56 AM     <50,000 CFU/ml mixed skin/urogenital rey.  No further workup     Blood Cultures: No results found for: \"BC\"  No results found for: \"BLOODCULT2\"  Organism:   Lab Results   Component Value Date/Time    ORG Proteus species 03/08/2018 02:35 PM         OKSANA Mullen - NP

## 2023-09-14 NOTE — PLAN OF CARE
Problem: Discharge Planning  Goal: Discharge to home or other facility with appropriate resources  Outcome: Progressing  Flowsheets (Taken 9/13/2023 2000)  Discharge to home or other facility with appropriate resources: Identify barriers to discharge with patient and caregiver     Problem: Pain  Goal: Verbalizes/displays adequate comfort level or baseline comfort level  Outcome: Progressing     Problem: Skin/Tissue Integrity  Goal: Absence of new skin breakdown  Description: 1. Monitor for areas of redness and/or skin breakdown  2. Assess vascular access sites hourly  3. Every 4-6 hours minimum:  Change oxygen saturation probe site  4. Every 4-6 hours:  If on nasal continuous positive airway pressure, respiratory therapy assess nares and determine need for appliance change or resting period. Outcome: Progressing     Problem: Confusion  Goal: Confusion, delirium, dementia, or psychosis is improved or at baseline  Description: INTERVENTIONS:  1. Assess for possible contributors to thought disturbance, including medications, impaired vision or hearing, underlying metabolic abnormalities, dehydration, psychiatric diagnoses, and notify attending LIP  2. [place] high risk fall precautions, as indicated  3. Provide frequent short contacts to provide reality reorientation, refocusing and direction  4. Decrease environmental stimuli, including noise as appropriate  5. Monitor and intervene to maintain adequate nutrition, hydration, elimination, sleep and activity  6. If unable to ensure safety without constant attention obtain sitter and review sitter guidelines with assigned personnel  7.  Initiate Psychosocial CNS and Spiritual Care consult, as indicated  Outcome: Progressing     Problem: ABCDS Injury Assessment  Goal: Absence of physical injury  Outcome: Progressing     Problem: Safety - Adult  Goal: Free from fall injury  Outcome: Progressing     Problem: Nutrition Deficit:  Goal: Optimize nutritional status  Outcome: Progressing     Problem: Chronic Conditions and Co-morbidities  Goal: Patient's chronic conditions and co-morbidity symptoms are monitored and maintained or improved  Outcome: Progressing  Flowsheets (Taken 9/13/2023 2000)  Care Plan - Patient's Chronic Conditions and Co-Morbidity Symptoms are Monitored and Maintained or Improved: Monitor and assess patient's chronic conditions and comorbid symptoms for stability, deterioration, or improvement

## 2023-09-14 NOTE — ACP (ADVANCE CARE PLANNING)
Advance Care Planning     General Advance Care Planning (ACP) Conversation    Date of Conversation: 9/13/2023  Conducted with: Patient with Decision Making Capacity   Healthcare Decision Maker: Next of Kin by law (only applies in absence of above) (name) herberth Madden Laurie Decision Maker:  No healthcare decision makers have been documented. Click here to complete [address] including selection of the Healthcare Decision Maker Relationship (ie \"Primary\")  Today we documented Decision Maker(s) consistent with Legal Next of Kin hierarchy.     Content/Action Overview:  Has NO ACP documents/care preferences - information provided, considering goals and options  Reviewed DNR/DNI and patient elects Full Code (Attempt Resuscitation)      Length of Voluntary ACP Conversation in minutes:  <16 minutes (Non-Billable)    Morteza Florez RN

## 2023-09-14 NOTE — CONSULTS
[address]  FAILURE PROGRAM      Leida Portillo 1950    History:  Past Medical History:   Diagnosis Date    Amenorrhea     Anxiety     Carpal tunnel syndrome of right wrist 07/16/2013    CHF (congestive heart failure) (HCC)     Dementia (HCC)     Depression     Fibromyalgia     Gastritis     HTN (hypertension)     Hypertension     Migraines     premenstrual    Narcolepsy without cataplexy(347.00)     Overweight(278.02)     Restless legs syndrome     TIA (transient ischemic attack)     Type II or unspecified type diabetes mellitus without mention of complication, not stated as uncontrolled        ECHO:  9/13/23   55-60%  HgA1C:  7/10/18   12.5  Iron Saturation:    Ferritin:     ACE/ARB/ARNI:   BB: Carvedilol 25 mg BID  Spironolactone:  Hydralazine 25 mg TID  SGLT2:    Last Hospital Admission:  1/28/22 uncontrolled type 2 DM  [facility]    Family Present: none  Advanced Directives: patient  full code    Writer went to floor to see patient for CHF education. Pt confused and yelling out. Not appropriate for CHF education. No family at bedside. Chart review completed. Patient a 68year old female, admitted for shortness of breath. Cardiology and hospital medicine both managing care. IV lasix in place. Plans are to change to oral diuretic tomorrow. Once medically stable pt will return to [facility] where she lives LTC. There they will assist with monitoring for s/s of CHF including daily weights. They will also assist with diet and fluid restrictions.        Patient recent weights and intake/output reviewed:    Patient Vitals for the past 96 hrs (Last 3 readings):   Weight   09/14/23 0441 174 lb 3.2 oz (79 kg)   09/13/23 0549 175 lb 9.6 oz (79.7 kg)   09/12/23 2303 210 lb (95.3 kg)         Intake/Output Summary (Last 24 hours) at 9/14/2023 1346  Last data filed at 9/14/2023 1121  Gross per 24 hour   Intake 940 ml   Output 1800 ml   Net -860 ml

## 2023-09-14 NOTE — PROGRESS NOTES
Called [place] (pt healthcare decision maker) 2x to inform about the rapid response and update pt status however her phone was not reachable.

## 2023-09-15 ENCOUNTER — APPOINTMENT (OUTPATIENT)
Dept: GENERAL RADIOLOGY | Age: 73
DRG: 291 | End: 2023-09-15
Payer: MEDICARE

## 2023-09-15 LAB
ALBUMIN SERPL-MCNC: 2.7 G/DL (ref 3.4–5)
ANION GAP SERPL CALCULATED.3IONS-SCNC: 10 MMOL/L (ref 3–16)
BUN SERPL-MCNC: 44 MG/DL (ref 7–20)
CALCIUM SERPL-MCNC: 8.6 MG/DL (ref 8.3–10.6)
CHLORIDE SERPL-SCNC: 105 MMOL/L (ref 99–110)
CO2 SERPL-SCNC: 28 MMOL/L (ref 21–32)
CREAT SERPL-MCNC: 1.7 MG/DL (ref 0.6–1.2)
GFR SERPLBLD CREATININE-BSD FMLA CKD-EPI: 31 ML/MIN/{1.73_M2}
GLUCOSE BLD-MCNC: 179 MG/DL (ref 70–99)
GLUCOSE BLD-MCNC: 254 MG/DL (ref 70–99)
GLUCOSE BLD-MCNC: 279 MG/DL (ref 70–99)
GLUCOSE BLD-MCNC: 80 MG/DL (ref 70–99)
GLUCOSE SERPL-MCNC: 79 MG/DL (ref 70–99)
MAGNESIUM SERPL-MCNC: 2.2 MG/DL (ref 1.8–2.4)
NT-PROBNP SERPL-MCNC: 3025 PG/ML (ref 0–124)
PERFORMED ON: ABNORMAL
PERFORMED ON: NORMAL
POTASSIUM SERPL-SCNC: 4.5 MMOL/L (ref 3.5–5.1)
SODIUM SERPL-SCNC: 143 MMOL/L (ref 136–145)

## 2023-09-15 PROCEDURE — 80048 BASIC METABOLIC PNL TOTAL CA: CPT

## 2023-09-15 PROCEDURE — 6370000000 HC RX 637 (ALT 250 FOR IP): Performed by: NURSE PRACTITIONER

## 2023-09-15 PROCEDURE — 1200000000 HC SEMI PRIVATE

## 2023-09-15 PROCEDURE — 83735 ASSAY OF MAGNESIUM: CPT

## 2023-09-15 PROCEDURE — 51798 US URINE CAPACITY MEASURE: CPT

## 2023-09-15 PROCEDURE — 99232 SBSQ HOSP IP/OBS MODERATE 35: CPT | Performed by: NURSE PRACTITIONER

## 2023-09-15 PROCEDURE — 6360000002 HC RX W HCPCS: Performed by: INTERNAL MEDICINE

## 2023-09-15 PROCEDURE — 2580000003 HC RX 258: Performed by: STUDENT IN AN ORGANIZED HEALTH CARE EDUCATION/TRAINING PROGRAM

## 2023-09-15 PROCEDURE — 6370000000 HC RX 637 (ALT 250 FOR IP): Performed by: STUDENT IN AN ORGANIZED HEALTH CARE EDUCATION/TRAINING PROGRAM

## 2023-09-15 PROCEDURE — 36415 COLL VENOUS BLD VENIPUNCTURE: CPT

## 2023-09-15 PROCEDURE — 83880 ASSAY OF NATRIURETIC PEPTIDE: CPT

## 2023-09-15 PROCEDURE — 71045 X-RAY EXAM CHEST 1 VIEW: CPT

## 2023-09-15 PROCEDURE — 82040 ASSAY OF SERUM ALBUMIN: CPT

## 2023-09-15 RX ORDER — FUROSEMIDE 10 MG/ML
40 INJECTION INTRAMUSCULAR; INTRAVENOUS ONCE
Status: COMPLETED | OUTPATIENT
Start: 2023-09-15 | End: 2023-09-15

## 2023-09-15 RX ADMIN — INSULIN GLARGINE 10 UNITS: 100 INJECTION, SOLUTION SUBCUTANEOUS at 20:40

## 2023-09-15 RX ADMIN — DOXYCYCLINE HYCLATE 100 MG: 100 TABLET, COATED ORAL at 20:40

## 2023-09-15 RX ADMIN — APIXABAN 5 MG: 5 TABLET, FILM COATED ORAL at 08:53

## 2023-09-15 RX ADMIN — BUSPIRONE HYDROCHLORIDE 10 MG: 5 TABLET ORAL at 08:52

## 2023-09-15 RX ADMIN — TORSEMIDE 20 MG: 20 TABLET ORAL at 08:53

## 2023-09-15 RX ADMIN — HYDRALAZINE HYDROCHLORIDE 25 MG: 25 TABLET, FILM COATED ORAL at 06:23

## 2023-09-15 RX ADMIN — CARVEDILOL 25 MG: 25 TABLET, FILM COATED ORAL at 08:53

## 2023-09-15 RX ADMIN — FUROSEMIDE 40 MG: 10 INJECTION, SOLUTION INTRAMUSCULAR; INTRAVENOUS at 15:50

## 2023-09-15 RX ADMIN — ATORVASTATIN CALCIUM 80 MG: 80 TABLET, FILM COATED ORAL at 20:41

## 2023-09-15 RX ADMIN — HYDRALAZINE HYDROCHLORIDE 25 MG: 25 TABLET, FILM COATED ORAL at 15:53

## 2023-09-15 RX ADMIN — CARVEDILOL 25 MG: 25 TABLET, FILM COATED ORAL at 16:48

## 2023-09-15 RX ADMIN — SODIUM CHLORIDE, PRESERVATIVE FREE 10 ML: 5 INJECTION INTRAVENOUS at 20:41

## 2023-09-15 RX ADMIN — DOXYCYCLINE HYCLATE 100 MG: 100 TABLET, COATED ORAL at 08:53

## 2023-09-15 RX ADMIN — POTASSIUM CHLORIDE 20 MEQ: 1500 TABLET, EXTENDED RELEASE ORAL at 08:52

## 2023-09-15 RX ADMIN — BUSPIRONE HYDROCHLORIDE 10 MG: 5 TABLET ORAL at 20:41

## 2023-09-15 RX ADMIN — HYDRALAZINE HYDROCHLORIDE 25 MG: 25 TABLET, FILM COATED ORAL at 20:41

## 2023-09-15 RX ADMIN — APIXABAN 5 MG: 5 TABLET, FILM COATED ORAL at 20:40

## 2023-09-15 RX ADMIN — QUETIAPINE FUMARATE 12.5 MG: 25 TABLET ORAL at 08:52

## 2023-09-15 RX ADMIN — QUETIAPINE FUMARATE 12.5 MG: 25 TABLET ORAL at 20:40

## 2023-09-15 RX ADMIN — INSULIN LISPRO 5 UNITS: 100 INJECTION, SOLUTION INTRAVENOUS; SUBCUTANEOUS at 16:49

## 2023-09-15 RX ADMIN — ROPINIROLE HYDROCHLORIDE 0.5 MG: 0.5 TABLET, FILM COATED ORAL at 20:41

## 2023-09-15 NOTE — PROGRESS NOTES
For patient safety, an [place] has been placed in patient's room. AVASURE monitoring needed for   , Confusion, Increased Fall Risk, Pulling at Lines, and Delirium. Writer placed call to monitor observer to verify camera number 8 and review patient name, reason for monitoring, and contact information for primary RN. Patient notified of use of remote monitoring upon implementation of camera and verbalized understanding.

## 2023-09-15 NOTE — PROGRESS NOTES
Physician Progress Note      PATIENT:               Bruna Sanchez  CSN #:                  [id number]  :                       1950  ADMIT DATE:       2023 12:39 PM  [address] [facility] DATE:  RESPONDING  PROVIDER #:        Meche Hunter NP          QUERY TEXT:    Patient admitted with CHF, noted to have atrial fibrillation and is maintained   on Eliquis. If possible, please document in progress notes and discharge   summary if you are evaluating and/or treating any of the following: The medical record reflects the following:  Risk Factors: CHF, HTN, DM, Age, Female  Clinical Indicators: Per ED \" history of A-fib and is on anticoagulation\"  Treatment: Eliquis, telemetry monitoring, supportive care    Thank you, Shira Carrero RN BSN CCDS CRCR  [email or web address]  Options provided:  -- Secondary hypercoagulable state in a patient with atrial fibrillation  -- Other - I will add my own diagnosis  -- Disagree - Not applicable / Not valid  -- Disagree - Clinically unable to determine / Unknown  -- Refer to Clinical Documentation Reviewer    PROVIDER RESPONSE TEXT:    This patient has secondary hypercoagulable state in a patient with atrial   fibrillation.     Query created by: Sarah Villagomez on 2023 11:12 AM      Electronically signed by:  Meche Hunter NP 9/15/2023 12:12 PM

## 2023-09-15 NOTE — CARE COORDINATION
Chart reviewed day 3. Care provided by nephro, cards and IM. Pt is LTC from [facility]. Pt received one dose of IV lasix today, She is getting a CT scan of chest, abd/pelvis tomorrow. She will return to [facility] when medically stable. Continue to follow.  Corona Wakefield RN

## 2023-09-15 NOTE — PROGRESS NOTES
Hospital Medicine Progress Note      Date of Admission: 2023  Hospital Day: 4    Chief Admission Complaint:    Chief Complaint   Patient presents with    Shortness of Breath     Patient comes from [facility] for shortness of breath. Patient with new onset CHF, Lasix 40mg/daily. EMS reports giving one breathing tx in route. Subjective:      Renal ultrasound revealing mild right hydronephrosis. Cr stable at 1.7. Discussed with nephrology today. Planning for non con CT chest/abd/pel tomorrow to further evaluate pleural effusions and right hydronephrosis, ordered for tomorrow morning. Discussed plan of care with the patient's daughter over the phone today. Pt is doing well overall, not on any oxygen and tells me she feels better. Presenting Admission History: This is a 79-year-old female with past medical history of hypertension, hyperlipidemia, type 2 diabetes mellitus, HFrEF, dementia, DVT, restless leg syndrome who presented to the emergency department with increasing shortness of breath. Patient poor historian due to dementia. According to the daughter and nursing home facility patient has been having increasing shortness of breath as well as lower extremity edema. Patient was started on antibiotics last week did have some improvement but due to edema she was started on Lasix a couple days ago. Despite treatment patient still had shortness of breath and edema prompting emergency room evaluation. At the time of evaluation patient remains on room air saturating above 92%. She is hemodynamically stable. Initial labs revealing elevated creatinine at 1.8, elevated BNP. Chest x-ray with vascular congestion. : Discussed plan of care with the patient's daughter, Majel Apgar, over the phone today. Confirmed FULL CODE status with her. Assessment/Plan:       Acute on chronic exacerbation systolic CHF.  POA: dyspnea, lower extremity edema  - Echocardiogram from  on

## 2023-09-16 ENCOUNTER — APPOINTMENT (OUTPATIENT)
Dept: CT IMAGING | Age: 73
DRG: 291 | End: 2023-09-16
Payer: MEDICARE

## 2023-09-16 LAB
ALBUMIN SERPL-MCNC: 2.8 G/DL (ref 3.4–5)
ANION GAP SERPL CALCULATED.3IONS-SCNC: 8 MMOL/L (ref 3–16)
BUN SERPL-MCNC: 50 MG/DL (ref 7–20)
CALCIUM SERPL-MCNC: 8.4 MG/DL (ref 8.3–10.6)
CHLORIDE SERPL-SCNC: 103 MMOL/L (ref 99–110)
CO2 SERPL-SCNC: 29 MMOL/L (ref 21–32)
CREAT SERPL-MCNC: 2 MG/DL (ref 0.6–1.2)
GFR SERPLBLD CREATININE-BSD FMLA CKD-EPI: 26 ML/MIN/{1.73_M2}
GLUCOSE BLD-MCNC: 185 MG/DL (ref 70–99)
GLUCOSE BLD-MCNC: 219 MG/DL (ref 70–99)
GLUCOSE BLD-MCNC: 236 MG/DL (ref 70–99)
GLUCOSE BLD-MCNC: 267 MG/DL (ref 70–99)
GLUCOSE SERPL-MCNC: 190 MG/DL (ref 70–99)
MAGNESIUM SERPL-MCNC: 2.2 MG/DL (ref 1.8–2.4)
PERFORMED ON: ABNORMAL
PHOSPHATE SERPL-MCNC: 4 MG/DL (ref 2.5–4.9)
POTASSIUM SERPL-SCNC: 4.2 MMOL/L (ref 3.5–5.1)
SODIUM SERPL-SCNC: 140 MMOL/L (ref 136–145)

## 2023-09-16 PROCEDURE — 51798 US URINE CAPACITY MEASURE: CPT

## 2023-09-16 PROCEDURE — 6370000000 HC RX 637 (ALT 250 FOR IP): Performed by: STUDENT IN AN ORGANIZED HEALTH CARE EDUCATION/TRAINING PROGRAM

## 2023-09-16 PROCEDURE — 80069 RENAL FUNCTION PANEL: CPT

## 2023-09-16 PROCEDURE — 6370000000 HC RX 637 (ALT 250 FOR IP): Performed by: NURSE PRACTITIONER

## 2023-09-16 PROCEDURE — 36415 COLL VENOUS BLD VENIPUNCTURE: CPT

## 2023-09-16 PROCEDURE — 1200000000 HC SEMI PRIVATE

## 2023-09-16 PROCEDURE — 71250 CT THORAX DX C-: CPT

## 2023-09-16 PROCEDURE — 83735 ASSAY OF MAGNESIUM: CPT

## 2023-09-16 RX ADMIN — TRAMADOL HYDROCHLORIDE 50 MG: 50 TABLET, COATED ORAL at 20:12

## 2023-09-16 RX ADMIN — DOXYCYCLINE HYCLATE 100 MG: 100 TABLET, COATED ORAL at 11:48

## 2023-09-16 RX ADMIN — ROPINIROLE HYDROCHLORIDE 0.5 MG: 0.5 TABLET, FILM COATED ORAL at 20:12

## 2023-09-16 RX ADMIN — POTASSIUM CHLORIDE 20 MEQ: 1500 TABLET, EXTENDED RELEASE ORAL at 11:48

## 2023-09-16 RX ADMIN — INSULIN LISPRO 5 UNITS: 100 INJECTION, SOLUTION INTRAVENOUS; SUBCUTANEOUS at 18:59

## 2023-09-16 RX ADMIN — TORSEMIDE 20 MG: 20 TABLET ORAL at 11:49

## 2023-09-16 RX ADMIN — APIXABAN 5 MG: 5 TABLET, FILM COATED ORAL at 11:49

## 2023-09-16 RX ADMIN — HYDRALAZINE HYDROCHLORIDE 25 MG: 25 TABLET, FILM COATED ORAL at 20:12

## 2023-09-16 RX ADMIN — QUETIAPINE FUMARATE 12.5 MG: 25 TABLET ORAL at 20:13

## 2023-09-16 RX ADMIN — ACETAMINOPHEN 650 MG: 325 TABLET ORAL at 14:37

## 2023-09-16 RX ADMIN — APIXABAN 5 MG: 5 TABLET, FILM COATED ORAL at 20:12

## 2023-09-16 RX ADMIN — CARVEDILOL 25 MG: 25 TABLET, FILM COATED ORAL at 11:48

## 2023-09-16 RX ADMIN — BUSPIRONE HYDROCHLORIDE 10 MG: 5 TABLET ORAL at 20:12

## 2023-09-16 RX ADMIN — CARVEDILOL 25 MG: 25 TABLET, FILM COATED ORAL at 18:59

## 2023-09-16 RX ADMIN — ATORVASTATIN CALCIUM 80 MG: 80 TABLET, FILM COATED ORAL at 20:12

## 2023-09-16 RX ADMIN — BUSPIRONE HYDROCHLORIDE 10 MG: 5 TABLET ORAL at 11:47

## 2023-09-16 RX ADMIN — INSULIN GLARGINE 10 UNITS: 100 INJECTION, SOLUTION SUBCUTANEOUS at 20:13

## 2023-09-16 RX ADMIN — QUETIAPINE FUMARATE 12.5 MG: 25 TABLET ORAL at 11:48

## 2023-09-16 RX ADMIN — INSULIN LISPRO 5 UNITS: 100 INJECTION, SOLUTION INTRAVENOUS; SUBCUTANEOUS at 14:00

## 2023-09-16 ASSESSMENT — PAIN DESCRIPTION - DESCRIPTORS
DESCRIPTORS: ACHING
DESCRIPTORS: ACHING

## 2023-09-16 ASSESSMENT — PAIN DESCRIPTION - ORIENTATION: ORIENTATION: RIGHT;LEFT;LOWER

## 2023-09-16 ASSESSMENT — PAIN SCALES - GENERAL
PAINLEVEL_OUTOF10: 0
PAINLEVEL_OUTOF10: 7

## 2023-09-16 ASSESSMENT — PAIN - FUNCTIONAL ASSESSMENT: PAIN_FUNCTIONAL_ASSESSMENT: ACTIVITIES ARE NOT PREVENTED

## 2023-09-16 ASSESSMENT — PAIN DESCRIPTION - LOCATION
LOCATION: LEG
LOCATION: BACK

## 2023-09-16 NOTE — PROGRESS NOTES
Hospital Medicine Progress Note      Date of Admission: 9/12/2023  Hospital Day: 5    Chief Admission Complaint:    Chief Complaint   Patient presents with    Shortness of Breath     Patient comes from [facility] for shortness of breath. Patient with new onset CHF, Lasix 40mg/daily. EMS reports giving one breathing tx in route. Subjective:      CT chest/abd/pel is pending. Cr up to 2.0 today. Pt tells me she does still feel some shortness of breath during my visit today. No peripheral edema noted. 2:50pm- Attempted to call patient's daughter to update on plan of care, no answer. Will attempt again tomorrow. Presenting Admission History: This is a 79-year-old female with past medical history of hypertension, hyperlipidemia, type 2 diabetes mellitus, HFrEF, dementia, DVT, restless leg syndrome who presented to the emergency department with increasing shortness of breath. Patient poor historian due to dementia. According to the daughter and nursing home facility patient has been having increasing shortness of breath as well as lower extremity edema. Patient was started on antibiotics last week did have some improvement but due to edema she was started on Lasix a couple days ago. Despite treatment patient still had shortness of breath and edema prompting emergency room evaluation. At the time of evaluation patient remains on room air saturating above 92%. She is hemodynamically stable. Initial labs revealing elevated creatinine at 1.8, elevated BNP. Chest x-ray with vascular congestion. 09/13: Discussed plan of care with the patient's daughter, Montana Franco, over the phone today. Confirmed FULL CODE status with her. Assessment/Plan:       Acute on chronic exacerbation systolic CHF. POA: dyspnea, lower extremity edema  - Echocardiogram from 2018 on [facility] with LVEF of 40-45% with mildly reduced LV systolic function and diffuse hypokinesis.   - Repeat TTE on 09/13/23 Date/Time    ORG Proteus species 03/08/2018 02:35 PM         OKSANA Mullen - NP

## 2023-09-16 NOTE — PROGRESS NOTES
ATTEMPTED TO REINSERT IV SEVERAL TIMES BUT PT CONTS TO REFUSE. STATED THAT SHE DID NOT WANT ANYMORE IVS OR TO BE POKED. PERFECT SERVE SENT TO MD. PT TOLERATING PO FLDS AND MEDS. ALSO SENT PERFECT SERVE TO MD THAT RESULTS OF CT SCAN ARE AVAILABLE.

## 2023-09-16 NOTE — PLAN OF CARE
Problem: Discharge Planning  Goal: Discharge to home or other facility with appropriate resources  Outcome: Progressing     Problem: Pain  Goal: Verbalizes/displays adequate comfort level or baseline comfort level  Outcome: Progressing     Problem: Skin/Tissue Integrity  Goal: Absence of new skin breakdown  Description: 1. Monitor for areas of redness and/or skin breakdown  2. Assess vascular access sites hourly  3. Every 4-6 hours minimum:  Change oxygen saturation probe site  4. Every 4-6 hours:  If on nasal continuous positive airway pressure, respiratory therapy assess nares and determine need for appliance change or resting period. Outcome: Progressing     Problem: Confusion  Goal: Confusion, delirium, dementia, or psychosis is improved or at baseline  Description: INTERVENTIONS:  1. Assess for possible contributors to thought disturbance, including medications, impaired vision or hearing, underlying metabolic abnormalities, dehydration, psychiatric diagnoses, and notify attending LIP  2. [place] high risk fall precautions, as indicated  3. Provide frequent short contacts to provide reality reorientation, refocusing and direction  4. Decrease environmental stimuli, including noise as appropriate  5. Monitor and intervene to maintain adequate nutrition, hydration, elimination, sleep and activity  6. If unable to ensure safety without constant attention obtain sitter and review sitter guidelines with assigned personnel  7.  Initiate Psychosocial CNS and Spiritual Care consult, as indicated  Outcome: Progressing     Problem: ABCDS Injury Assessment  Goal: Absence of physical injury  Outcome: Progressing     Problem: Safety - Adult  Goal: Free from fall injury  Outcome: Progressing     Problem: Nutrition Deficit:  Goal: Optimize nutritional status  Outcome: Progressing     Problem: Chronic Conditions and Co-morbidities  Goal: Patient's chronic conditions and co-morbidity symptoms are monitored and maintained or improved  Outcome: Progressing

## 2023-09-16 NOTE — PLAN OF CARE
Patient's EF (Ejection Fraction) is greater than 40%    Heart Failure Medications:  Diuretics[de-identified] Torsemide    (One of the following REQUIRED for EF </= 40%/SYSTOLIC FAILURE but MAY be used in EF% >40%/DIASTOLIC FAILURE)        ACE[de-identified] None        ARB[de-identified] None         ARNI[de-identified] None    (Beta Blockers)  NON- Evidenced Based Beta Blocker (for EF% >40%/DIASTOLIC FAILURE): None    Evidenced Based Beta Blocker::(REQUIRED for EF% <40%/SYSTOLIC FAILURE) Carvedilol- Coreg  . .................................................................................................................................................. Healthy Weight Tracking - BMI + Meds 2/8/2023 9/12/2023 9/12/2023 9/13/2023 9/14/2023 9/15/2023 9/16/2023   Weight 210 lb 210 lb 210 lb 175 lb 9.6 oz 174 lb 3.2 oz 170 lb 171 lb 11.8 oz   Height 5' 4\" - 5' 3\" - - - -   Body Mass Index 36.04 kg/m2 - 37.2 kg/m2 31.11 kg/m2 30.86 kg/m2 30.11 kg/m2 30.42 kg/m2   Some recent data might be hidden         Patient's weights and intake/output reviewed: Yes    Daily Weight log at bedside, patient/family participation in use of log: \"yes    Patient's Last Weight: 174 lbs obtained by standing scale. Difference of 1 lbs less than last documented weight. Intake/Output Summary (Last 24 hours) at 9/16/2023 1604  Last data filed at 9/16/2023 0630  Gross per 24 hour   Intake 240 ml   Output 1700 ml   Net -1460 ml       Education Booklet Provided: yes    Comorbidities Reviewed Yes    Patient has a past medical history of Amenorrhea, Anxiety, Carpal tunnel syndrome of right wrist, CHF (congestive heart failure) ([address]), Dementia ([address]), Depression, Fibromyalgia, Gastritis, HTN (hypertension), Hypertension, Migraines, Narcolepsy without cataplexy(347.00), Overweight(278.02), Restless legs syndrome, TIA (transient ischemic attack), and Type II or unspecified type diabetes mellitus without mention of complication, not stated as uncontrolled.      >>For CHF and Comorbidity documentation

## 2023-09-17 LAB
ANION GAP SERPL CALCULATED.3IONS-SCNC: 7 MMOL/L (ref 3–16)
BACTERIA URNS QL MICRO: ABNORMAL /HPF
BILIRUB UR QL STRIP.AUTO: NEGATIVE
BUN SERPL-MCNC: 53 MG/DL (ref 7–20)
CALCIUM SERPL-MCNC: 8.3 MG/DL (ref 8.3–10.6)
CHLORIDE SERPL-SCNC: 106 MMOL/L (ref 99–110)
CLARITY UR: CLEAR
CO2 SERPL-SCNC: 27 MMOL/L (ref 21–32)
COLOR UR: YELLOW
CREAT SERPL-MCNC: 2.1 MG/DL (ref 0.6–1.2)
EPI CELLS #/AREA URNS HPF: ABNORMAL /HPF (ref 0–5)
GFR SERPLBLD CREATININE-BSD FMLA CKD-EPI: 24 ML/MIN/{1.73_M2}
GLUCOSE BLD-MCNC: 121 MG/DL (ref 70–99)
GLUCOSE BLD-MCNC: 130 MG/DL (ref 70–99)
GLUCOSE BLD-MCNC: 200 MG/DL (ref 70–99)
GLUCOSE BLD-MCNC: 280 MG/DL (ref 70–99)
GLUCOSE SERPL-MCNC: 133 MG/DL (ref 70–99)
GLUCOSE UR STRIP.AUTO-MCNC: 100 MG/DL
HGB UR QL STRIP.AUTO: NEGATIVE
KETONES UR STRIP.AUTO-MCNC: NEGATIVE MG/DL
LEUKOCYTE ESTERASE UR QL STRIP.AUTO: NEGATIVE
MAGNESIUM SERPL-MCNC: 2.2 MG/DL (ref 1.8–2.4)
NITRITE UR QL STRIP.AUTO: NEGATIVE
PERFORMED ON: ABNORMAL
PH UR STRIP.AUTO: 7 [PH] (ref 5–8)
POTASSIUM SERPL-SCNC: 4.7 MMOL/L (ref 3.5–5.1)
PROT UR STRIP.AUTO-MCNC: >=300 MG/DL
RBC #/AREA URNS HPF: ABNORMAL /HPF (ref 0–4)
SODIUM SERPL-SCNC: 140 MMOL/L (ref 136–145)
SP GR UR STRIP.AUTO: 1.02 (ref 1–1.03)
UA DIPSTICK W REFLEX MICRO PNL UR: YES
URN SPEC COLLECT METH UR: ABNORMAL
UROBILINOGEN UR STRIP-ACNC: 0.2 E.U./DL
WBC #/AREA URNS HPF: ABNORMAL /HPF (ref 0–5)

## 2023-09-17 PROCEDURE — 6370000000 HC RX 637 (ALT 250 FOR IP): Performed by: NURSE PRACTITIONER

## 2023-09-17 PROCEDURE — 80048 BASIC METABOLIC PNL TOTAL CA: CPT

## 2023-09-17 PROCEDURE — 36415 COLL VENOUS BLD VENIPUNCTURE: CPT

## 2023-09-17 PROCEDURE — 83735 ASSAY OF MAGNESIUM: CPT

## 2023-09-17 PROCEDURE — 6370000000 HC RX 637 (ALT 250 FOR IP): Performed by: STUDENT IN AN ORGANIZED HEALTH CARE EDUCATION/TRAINING PROGRAM

## 2023-09-17 PROCEDURE — 81001 URINALYSIS AUTO W/SCOPE: CPT

## 2023-09-17 PROCEDURE — 1200000000 HC SEMI PRIVATE

## 2023-09-17 PROCEDURE — 51798 US URINE CAPACITY MEASURE: CPT

## 2023-09-17 RX ADMIN — HYDRALAZINE HYDROCHLORIDE 25 MG: 25 TABLET, FILM COATED ORAL at 21:12

## 2023-09-17 RX ADMIN — CARVEDILOL 25 MG: 25 TABLET, FILM COATED ORAL at 18:02

## 2023-09-17 RX ADMIN — INSULIN LISPRO 5 UNITS: 100 INJECTION, SOLUTION INTRAVENOUS; SUBCUTANEOUS at 18:02

## 2023-09-17 RX ADMIN — QUETIAPINE FUMARATE 12.5 MG: 25 TABLET ORAL at 10:16

## 2023-09-17 RX ADMIN — CARVEDILOL 25 MG: 25 TABLET, FILM COATED ORAL at 10:16

## 2023-09-17 RX ADMIN — ACETAMINOPHEN 650 MG: 325 TABLET ORAL at 10:16

## 2023-09-17 RX ADMIN — APIXABAN 5 MG: 5 TABLET, FILM COATED ORAL at 10:16

## 2023-09-17 RX ADMIN — HYDRALAZINE HYDROCHLORIDE 25 MG: 25 TABLET, FILM COATED ORAL at 05:47

## 2023-09-17 RX ADMIN — BUSPIRONE HYDROCHLORIDE 10 MG: 5 TABLET ORAL at 10:16

## 2023-09-17 RX ADMIN — QUETIAPINE FUMARATE 12.5 MG: 25 TABLET ORAL at 20:02

## 2023-09-17 RX ADMIN — HYDRALAZINE HYDROCHLORIDE 25 MG: 25 TABLET, FILM COATED ORAL at 15:54

## 2023-09-17 RX ADMIN — INSULIN GLARGINE 10 UNITS: 100 INJECTION, SOLUTION SUBCUTANEOUS at 21:13

## 2023-09-17 RX ADMIN — APIXABAN 5 MG: 5 TABLET, FILM COATED ORAL at 20:02

## 2023-09-17 RX ADMIN — ATORVASTATIN CALCIUM 80 MG: 80 TABLET, FILM COATED ORAL at 20:02

## 2023-09-17 RX ADMIN — ROPINIROLE HYDROCHLORIDE 0.5 MG: 0.5 TABLET, FILM COATED ORAL at 20:02

## 2023-09-17 RX ADMIN — BUSPIRONE HYDROCHLORIDE 10 MG: 5 TABLET ORAL at 20:02

## 2023-09-17 ASSESSMENT — PAIN SCALES - GENERAL
PAINLEVEL_OUTOF10: 0

## 2023-09-17 NOTE — PLAN OF CARE
Patient's EF (Ejection Fraction) is greater than 40%    Heart Failure Medications:  Diuretics[de-identified] None    (One of the following REQUIRED for EF </= 40%/SYSTOLIC FAILURE but MAY be used in EF% >40%/DIASTOLIC FAILURE)        ACE[de-identified] None        ARB[de-identified] None         ARNI[de-identified] None    (Beta Blockers)  NON- Evidenced Based Beta Blocker (for EF% >40%/DIASTOLIC FAILURE): None    Evidenced Based Beta Blocker::(REQUIRED for EF% <40%/SYSTOLIC FAILURE) coreg  . .................................................................................................................................................. Healthy Weight Tracking - BMI + Meds 9/12/2023 9/12/2023 9/13/2023 9/14/2023 9/15/2023 9/16/2023 9/17/2023   Weight 210 lb 210 lb 175 lb 9.6 oz 174 lb 3.2 oz 170 lb 171 lb 11.8 oz 170 lb 13.7 oz   Height - 5' 3\" - - - - -   Body Mass Index - 37.2 kg/m2 31.11 kg/m2 30.86 kg/m2 30.11 kg/m2 30.42 kg/m2 30.27 kg/m2   Some recent data might be hidden         Patient's weights and intake/output reviewed: Yes    Daily Weight log at bedside, patient/family participation in use of log: \"yes    Patient's Last Weight: 170 lbs obtained by standing scale. Difference of 1 lbs less than last documented weight. 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      >>For CHF and Comorbidity documentation on Education

## 2023-09-17 NOTE — PROGRESS NOTES
Hospital Medicine Progress Note      Date of Admission: 9/12/2023  Hospital Day: 6    Chief Admission Complaint:    Chief Complaint   Patient presents with    Shortness of Breath     Patient comes from [facility] for shortness of breath. Patient with new onset CHF, Lasix 40mg/daily. EMS reports giving one breathing tx in route. Subjective:        CT chest/abd/pel with scattered calcified granulomas. Subpleural atelectasis. Trace right and small left layering pleural effusions are present. No evidence of hydronephrosis or obstructive nephrolithiasis. Moderately distended urinary bladder. Will check repeat UA. Cr up to 2.1, BUN 53 today. Torsemide now on hold. Awaiting further nephro recs. Discussed plan of care over the phone with her daughter, Padmini Mendoza over the phone. She would like to continue to be updated daily over the phone. Presenting Admission History: This is a 66-year-old female with past medical history of hypertension, hyperlipidemia, type 2 diabetes mellitus, HFrEF, dementia, DVT, restless leg syndrome who presented to the emergency department with increasing shortness of breath. Patient poor historian due to dementia. According to the daughter and nursing home facility patient has been having increasing shortness of breath as well as lower extremity edema. Patient was started on antibiotics last week did have some improvement but due to edema she was started on Lasix a couple days ago. Despite treatment patient still had shortness of breath and edema prompting emergency room evaluation. At the time of evaluation patient remains on room air saturating above 92%. She is hemodynamically stable. Initial labs revealing elevated creatinine at 1.8, elevated BNP. Chest x-ray with vascular congestion. 09/13: Discussed plan of care with the patient's daughter, Padmini Mendoza, over the phone today. Confirmed FULL CODE status with her.         Assessment/Plan:       Acute on

## 2023-09-17 NOTE — FLOWSHEET NOTE
09/17/23 1800   Urine Assessment   Intermittent/Straight Cath (mL) 400 mL     Patient with no urge to void today. Bladder scan after voiding 100 ml earlier today 212 ml. Straight cath x1 per Dr. Julia Nelson, 400 ml returned.

## 2023-09-18 LAB
ANION GAP SERPL CALCULATED.3IONS-SCNC: 9 MMOL/L (ref 3–16)
BUN SERPL-MCNC: 57 MG/DL (ref 7–20)
CALCIUM SERPL-MCNC: 8.4 MG/DL (ref 8.3–10.6)
CHLORIDE SERPL-SCNC: 105 MMOL/L (ref 99–110)
CO2 SERPL-SCNC: 26 MMOL/L (ref 21–32)
CREAT SERPL-MCNC: 2.2 MG/DL (ref 0.6–1.2)
GFR SERPLBLD CREATININE-BSD FMLA CKD-EPI: 23 ML/MIN/{1.73_M2}
GLUCOSE BLD-MCNC: 148 MG/DL (ref 70–99)
GLUCOSE BLD-MCNC: 202 MG/DL (ref 70–99)
GLUCOSE BLD-MCNC: 226 MG/DL (ref 70–99)
GLUCOSE BLD-MCNC: 255 MG/DL (ref 70–99)
GLUCOSE BLD-MCNC: 259 MG/DL (ref 70–99)
GLUCOSE SERPL-MCNC: 235 MG/DL (ref 70–99)
NT-PROBNP SERPL-MCNC: 2040 PG/ML (ref 0–124)
PERFORMED ON: ABNORMAL
POTASSIUM SERPL-SCNC: 4.8 MMOL/L (ref 3.5–5.1)
SODIUM SERPL-SCNC: 140 MMOL/L (ref 136–145)

## 2023-09-18 PROCEDURE — 36415 COLL VENOUS BLD VENIPUNCTURE: CPT

## 2023-09-18 PROCEDURE — 83880 ASSAY OF NATRIURETIC PEPTIDE: CPT

## 2023-09-18 PROCEDURE — 51798 US URINE CAPACITY MEASURE: CPT

## 2023-09-18 PROCEDURE — 6370000000 HC RX 637 (ALT 250 FOR IP): Performed by: NURSE PRACTITIONER

## 2023-09-18 PROCEDURE — 80048 BASIC METABOLIC PNL TOTAL CA: CPT

## 2023-09-18 PROCEDURE — 6370000000 HC RX 637 (ALT 250 FOR IP): Performed by: STUDENT IN AN ORGANIZED HEALTH CARE EDUCATION/TRAINING PROGRAM

## 2023-09-18 PROCEDURE — 1200000000 HC SEMI PRIVATE

## 2023-09-18 PROCEDURE — 2580000003 HC RX 258: Performed by: INTERNAL MEDICINE

## 2023-09-18 RX ORDER — SODIUM CHLORIDE 9 MG/ML
INJECTION, SOLUTION INTRAVENOUS CONTINUOUS
Status: DISCONTINUED | OUTPATIENT
Start: 2023-09-18 | End: 2023-09-22

## 2023-09-18 RX ADMIN — INSULIN GLARGINE 10 UNITS: 100 INJECTION, SOLUTION SUBCUTANEOUS at 21:40

## 2023-09-18 RX ADMIN — ATORVASTATIN CALCIUM 80 MG: 80 TABLET, FILM COATED ORAL at 21:35

## 2023-09-18 RX ADMIN — CARVEDILOL 25 MG: 25 TABLET, FILM COATED ORAL at 15:51

## 2023-09-18 RX ADMIN — INSULIN LISPRO 5 UNITS: 100 INJECTION, SOLUTION INTRAVENOUS; SUBCUTANEOUS at 18:40

## 2023-09-18 RX ADMIN — BUSPIRONE HYDROCHLORIDE 10 MG: 5 TABLET ORAL at 09:13

## 2023-09-18 RX ADMIN — Medication 6 MG: at 21:33

## 2023-09-18 RX ADMIN — BUSPIRONE HYDROCHLORIDE 10 MG: 5 TABLET ORAL at 21:36

## 2023-09-18 RX ADMIN — CARVEDILOL 25 MG: 25 TABLET, FILM COATED ORAL at 09:13

## 2023-09-18 RX ADMIN — HYDRALAZINE HYDROCHLORIDE 25 MG: 25 TABLET, FILM COATED ORAL at 04:46

## 2023-09-18 RX ADMIN — APIXABAN 5 MG: 5 TABLET, FILM COATED ORAL at 09:13

## 2023-09-18 RX ADMIN — ROPINIROLE HYDROCHLORIDE 0.5 MG: 0.5 TABLET, FILM COATED ORAL at 21:34

## 2023-09-18 RX ADMIN — Medication 6 MG: at 00:11

## 2023-09-18 RX ADMIN — APIXABAN 5 MG: 5 TABLET, FILM COATED ORAL at 21:35

## 2023-09-18 RX ADMIN — SODIUM CHLORIDE: 9 INJECTION, SOLUTION INTRAVENOUS at 14:51

## 2023-09-18 RX ADMIN — HYDRALAZINE HYDROCHLORIDE 25 MG: 25 TABLET, FILM COATED ORAL at 15:51

## 2023-09-18 RX ADMIN — INSULIN LISPRO 5 UNITS: 100 INJECTION, SOLUTION INTRAVENOUS; SUBCUTANEOUS at 13:44

## 2023-09-18 RX ADMIN — INSULIN LISPRO 5 UNITS: 100 INJECTION, SOLUTION INTRAVENOUS; SUBCUTANEOUS at 09:15

## 2023-09-18 RX ADMIN — HYDRALAZINE HYDROCHLORIDE 25 MG: 25 TABLET, FILM COATED ORAL at 21:35

## 2023-09-18 RX ADMIN — QUETIAPINE FUMARATE 12.5 MG: 25 TABLET ORAL at 21:36

## 2023-09-18 RX ADMIN — QUETIAPINE FUMARATE 12.5 MG: 25 TABLET ORAL at 09:13

## 2023-09-18 ASSESSMENT — PAIN DESCRIPTION - LOCATION: LOCATION: BACK

## 2023-09-18 ASSESSMENT — PAIN SCALES - GENERAL
PAINLEVEL_OUTOF10: 0
PAINLEVEL_OUTOF10: 0
PAINLEVEL_OUTOF10: 4

## 2023-09-18 NOTE — CONSULTS
Consult Call Back    Goals of Care    Who: Katerin Reilly  Date:9/18/2023,  Time:9:53 AM    Electronically signed by Emerald Ham on 9/18/23 at 9:53 AM EDT    Palliative Care added to treatment team and called

## 2023-09-18 NOTE — CARE COORDINATION
Chart reviewed day 6. Care provided by nephro and IM. Pt is from LTC at [facility]. BNP improving at 2040, BUN and creat rising 53 and 2.1. Will continue to follow course for needs and transport to [facility] when medically ready.  Chava Hubbard RN

## 2023-09-18 NOTE — PLAN OF CARE
Problem: Discharge Planning  Goal: Discharge to home or other facility with appropriate resources  Outcome: Progressing  Flowsheets (Taken 9/17/2023 2100)  Discharge to home or other facility with appropriate resources:   Identify barriers to discharge with patient and caregiver   Arrange for needed discharge resources and transportation as appropriate   Identify discharge learning needs (meds, wound care, etc)     Problem: Pain  Goal: Verbalizes/displays adequate comfort level or baseline comfort level  Outcome: Progressing     Problem: Skin/Tissue Integrity  Goal: Absence of new skin breakdown  Description: 1. Monitor for areas of redness and/or skin breakdown  2. Assess vascular access sites hourly  3. Every 4-6 hours minimum:  Change oxygen saturation probe site  4. Every 4-6 hours:  If on nasal continuous positive airway pressure, respiratory therapy assess nares and determine need for appliance change or resting period. Outcome: Progressing     Problem: Confusion  Goal: Confusion, delirium, dementia, or psychosis is improved or at baseline  Description: INTERVENTIONS:  1. Assess for possible contributors to thought disturbance, including medications, impaired vision or hearing, underlying metabolic abnormalities, dehydration, psychiatric diagnoses, and notify attending LIP  2. [place] high risk fall precautions, as indicated  3. Provide frequent short contacts to provide reality reorientation, refocusing and direction  4. Decrease environmental stimuli, including noise as appropriate  5. Monitor and intervene to maintain adequate nutrition, hydration, elimination, sleep and activity  6. If unable to ensure safety without constant attention obtain sitter and review sitter guidelines with assigned personnel  7.  Initiate Psychosocial CNS and Spiritual Care consult, as indicated  Outcome: Progressing  Flowsheets (Taken 9/17/2023 2100)  Effect of thought disturbance (confusion, delirium, dementia, or

## 2023-09-18 NOTE — PROGRESS NOTES
Pt refusing vital sign check, blood sugar checks, and check and changes. Pt saying \"leave me alone, get out. \"

## 2023-09-18 NOTE — PLAN OF CARE
Problem: Discharge Planning  Goal: Discharge to home or other facility with appropriate resources  9/18/2023 1027 by Leroy Prince RN  Outcome: Progressing  Flowsheets (Taken 9/18/2023 0959)  Discharge to home or other facility with appropriate resources: Identify barriers to discharge with patient and caregiver  9/17/2023 2350 by Catalina Hartman RN  Outcome: Progressing  Flowsheets (Taken 9/17/2023 2100)  Discharge to home or other facility with appropriate resources:   Identify barriers to discharge with patient and caregiver   Arrange for needed discharge resources and transportation as appropriate   Identify discharge learning needs (meds, wound care, etc)     Problem: Pain  Goal: Verbalizes/displays adequate comfort level or baseline comfort level  9/18/2023 1027 by Leroy Prince RN  Outcome: Progressing  9/17/2023 2350 by Catalina Hartman RN  Outcome: Progressing     Problem: Skin/Tissue Integrity  Goal: Absence of new skin breakdown  Description: 1. Monitor for areas of redness and/or skin breakdown  2. Assess vascular access sites hourly  3. Every 4-6 hours minimum:  Change oxygen saturation probe site  4. Every 4-6 hours:  If on nasal continuous positive airway pressure, respiratory therapy assess nares and determine need for appliance change or resting period. 9/18/2023 1027 by Leroy Prince RN  Outcome: Progressing  9/17/2023 2350 by Catalina Hartman RN  Outcome: Progressing     Problem: Confusion  Goal: Confusion, delirium, dementia, or psychosis is improved or at baseline  Description: INTERVENTIONS:  1. Assess for possible contributors to thought disturbance, including medications, impaired vision or hearing, underlying metabolic abnormalities, dehydration, psychiatric diagnoses, and notify attending LIP  2. [place] high risk fall precautions, as indicated  3. Provide frequent short contacts to provide reality reorientation, refocusing and direction  4.  Decrease environmental stimuli, including noise as appropriate  5. Monitor and intervene to maintain adequate nutrition, hydration, elimination, sleep and activity  6. If unable to ensure safety without constant attention obtain sitter and review sitter guidelines with assigned personnel  7.  Initiate Psychosocial CNS and Spiritual Care consult, as indicated  9/18/2023 1027 by Johnathon Tsang RN  Outcome: Progressing  Flowsheets (Taken 9/18/2023 8434)  Effect of thought disturbance (confusion, delirium, dementia, or psychosis) are managed with adequate functional status: Assess for contributors to thought disturbance, including medications, impaired vision or hearing, underlying metabolic abnormalities, dehydration, psychiatric diagnoses, notify LIP  9/17/2023 2350 by Misael Delgadilol RN  Outcome: Progressing  Flowsheets (Taken 9/17/2023 2100)  Effect of thought disturbance (confusion, delirium, dementia, or psychosis) are managed with adequate functional status: Assess for contributors to thought disturbance, including medications, impaired vision or hearing, underlying metabolic abnormalities, dehydration, psychiatric diagnoses, notify LIP     Problem: ABCDS Injury Assessment  Goal: Absence of physical injury  9/18/2023 1027 by Johnathon Tsang RN  Outcome: Progressing  9/17/2023 2350 by Misael Delgadillo RN  Outcome: Progressing     Problem: Safety - Adult  Goal: Free from fall injury  9/18/2023 1027 by Johnathon Tsang RN  Outcome: Progressing  9/17/2023 2350 by Misael Delgadillo RN  Outcome: Progressing     Problem: Nutrition Deficit:  Goal: Optimize nutritional status  9/18/2023 1027 by Johnathon Tsang RN  Outcome: Progressing  9/17/2023 2350 by Misael Delgadillo RN  Outcome: Progressing     Problem: Chronic Conditions and Co-morbidities  Goal: Patient's chronic conditions and co-morbidity symptoms are monitored and maintained or improved  9/18/2023 1027 by Johnathon Tsang RN  Outcome: Progressing  Flowsheets (Taken 9/18/2023 0959)  Care Plan - Patient's Chronic

## 2023-09-18 NOTE — CONSULTS
Consult Call Back    [place], APRN-CNP  Date:9/18/2023,  Time:2:44 PM    Palliative Care saw pt and left note on 9/18/2023 at 2:04 pm.    Electronically signed by Erick Lim on 9/18/23 at 2:44 PM EDT

## 2023-09-18 NOTE — PROGRESS NOTES
Turned patient to get oracio lift and patient started screaming out \"leave me alone god damn it\". Explained to patient that we needed to get her weight and offered her the choice of standing to the scale or the oracio lift. Patient stated she was feeling very weak, so the oracio lift was used. Patient continued to scream out to [facility] her alone\". Also changed patient's diaper and did rudy care. Patient continued to scream at the nurse and PCA. Explained to patient that she needed to get cleaned. No evidence of learning.

## 2023-09-18 NOTE — PROGRESS NOTES
Patient keeps pulling off tele leads and electrodes. Have replaced electrodes multiple times. Have educated patient on the need for keeping the electrodes on. Patient verbalized understanding, but then takes off electrodes again a few minutes later.

## 2023-09-18 NOTE — ACP (ADVANCE CARE PLANNING)
Advance Care Planning     General Advance Care Planning (ACP) Conversation    Date of Conversation: 9/12/2023  Conducted with:  Healthcare Decision Maker: Named in Advance Directive or Healthcare Power of  (name) Yulissa Cueva (daughter/POA)    Healthcare Decision Maker:  No healthcare decision makers have been documented. Click here to complete [address] including selection of the Healthcare Decision Maker Relationship (ie \"Primary\")   Today we documented Decision Maker(s). The patient will provide ACP documents. Content/Action Overview: Has ACP document(s) on file - reflects the patient's care preferences  Reviewed DNR/DNI and patient elects DNR order - completed portable DNR form & placed order  treatment goals, benefit/burden of treatment options, artificial nutrition, ventilation preferences, hospitalization preferences, resuscitation preferences, and end of life care preferences (vegetative state/imminent death)    Discussed code status with daughter/ POA Ronda Darby. [employer] has the [employer] on file, Ronda Darby can also bring in a copy. Patient's spuse has vascualr dementia. Patient has 2 other children, but they are not involved. Discussed with Ronda Darby that patient's stated wishes to be a full code were stated prior to her onset of dementia. We discussed trajectory of dementia. Ronda Darby states given the dementia and the difficulties caring for patient at this time, it does not make sense anymore for her to be resuscitated. She would like to change patient's code status to DNR/DNI. She would like patient to be allowed to have a natural death. Code status changed to Limited x 4 Nos to reflect daughter's wishes to remain aggressive but to be a DNR/DNI.         Aisha Lopez, APRN - CNP

## 2023-09-19 LAB
ANION GAP SERPL CALCULATED.3IONS-SCNC: 11 MMOL/L (ref 3–16)
BUN SERPL-MCNC: 58 MG/DL (ref 7–20)
CALCIUM SERPL-MCNC: 8.6 MG/DL (ref 8.3–10.6)
CHLORIDE SERPL-SCNC: 105 MMOL/L (ref 99–110)
CO2 SERPL-SCNC: 23 MMOL/L (ref 21–32)
CREAT SERPL-MCNC: 2 MG/DL (ref 0.6–1.2)
GFR SERPLBLD CREATININE-BSD FMLA CKD-EPI: 26 ML/MIN/{1.73_M2}
GLUCOSE BLD-MCNC: 210 MG/DL (ref 70–99)
GLUCOSE BLD-MCNC: 238 MG/DL (ref 70–99)
GLUCOSE BLD-MCNC: 251 MG/DL (ref 70–99)
GLUCOSE BLD-MCNC: 311 MG/DL (ref 70–99)
GLUCOSE SERPL-MCNC: 260 MG/DL (ref 70–99)
PERFORMED ON: ABNORMAL
POTASSIUM SERPL-SCNC: 4.4 MMOL/L (ref 3.5–5.1)
SODIUM SERPL-SCNC: 139 MMOL/L (ref 136–145)

## 2023-09-19 PROCEDURE — 6370000000 HC RX 637 (ALT 250 FOR IP): Performed by: STUDENT IN AN ORGANIZED HEALTH CARE EDUCATION/TRAINING PROGRAM

## 2023-09-19 PROCEDURE — 6370000000 HC RX 637 (ALT 250 FOR IP): Performed by: NURSE PRACTITIONER

## 2023-09-19 PROCEDURE — 80048 BASIC METABOLIC PNL TOTAL CA: CPT

## 2023-09-19 PROCEDURE — 2580000003 HC RX 258: Performed by: STUDENT IN AN ORGANIZED HEALTH CARE EDUCATION/TRAINING PROGRAM

## 2023-09-19 PROCEDURE — 2580000003 HC RX 258: Performed by: INTERNAL MEDICINE

## 2023-09-19 PROCEDURE — 1200000000 HC SEMI PRIVATE

## 2023-09-19 PROCEDURE — 83036 HEMOGLOBIN GLYCOSYLATED A1C: CPT

## 2023-09-19 PROCEDURE — 36415 COLL VENOUS BLD VENIPUNCTURE: CPT

## 2023-09-19 RX ORDER — INSULIN LISPRO 100 [IU]/ML
0-8 INJECTION, SOLUTION INTRAVENOUS; SUBCUTANEOUS
Status: DISCONTINUED | OUTPATIENT
Start: 2023-09-19 | End: 2023-09-23 | Stop reason: HOSPADM

## 2023-09-19 RX ORDER — TRAMADOL HYDROCHLORIDE 50 MG/1
50 TABLET ORAL EVERY 12 HOURS PRN
Status: DISCONTINUED | OUTPATIENT
Start: 2023-09-19 | End: 2023-09-23 | Stop reason: HOSPADM

## 2023-09-19 RX ORDER — INSULIN LISPRO 100 [IU]/ML
0-4 INJECTION, SOLUTION INTRAVENOUS; SUBCUTANEOUS NIGHTLY
Status: DISCONTINUED | OUTPATIENT
Start: 2023-09-19 | End: 2023-09-23 | Stop reason: HOSPADM

## 2023-09-19 RX ORDER — DEXTROSE MONOHYDRATE 100 MG/ML
INJECTION, SOLUTION INTRAVENOUS CONTINUOUS PRN
Status: DISCONTINUED | OUTPATIENT
Start: 2023-09-19 | End: 2023-09-23 | Stop reason: HOSPADM

## 2023-09-19 RX ADMIN — TRAMADOL HYDROCHLORIDE 50 MG: 50 TABLET ORAL at 16:04

## 2023-09-19 RX ADMIN — CARVEDILOL 25 MG: 25 TABLET, FILM COATED ORAL at 08:50

## 2023-09-19 RX ADMIN — ATORVASTATIN CALCIUM 80 MG: 80 TABLET, FILM COATED ORAL at 20:07

## 2023-09-19 RX ADMIN — SODIUM CHLORIDE, PRESERVATIVE FREE 10 ML: 5 INJECTION INTRAVENOUS at 20:10

## 2023-09-19 RX ADMIN — BUSPIRONE HYDROCHLORIDE 10 MG: 5 TABLET ORAL at 20:07

## 2023-09-19 RX ADMIN — INSULIN LISPRO 4 UNITS: 100 INJECTION, SOLUTION INTRAVENOUS; SUBCUTANEOUS at 21:28

## 2023-09-19 RX ADMIN — ROPINIROLE HYDROCHLORIDE 0.5 MG: 0.5 TABLET, FILM COATED ORAL at 20:07

## 2023-09-19 RX ADMIN — INSULIN LISPRO 2 UNITS: 100 INJECTION, SOLUTION INTRAVENOUS; SUBCUTANEOUS at 08:49

## 2023-09-19 RX ADMIN — HYDRALAZINE HYDROCHLORIDE 25 MG: 25 TABLET, FILM COATED ORAL at 08:49

## 2023-09-19 RX ADMIN — Medication 6 MG: at 20:07

## 2023-09-19 RX ADMIN — SODIUM CHLORIDE: 9 INJECTION, SOLUTION INTRAVENOUS at 08:53

## 2023-09-19 RX ADMIN — BUSPIRONE HYDROCHLORIDE 10 MG: 5 TABLET ORAL at 08:50

## 2023-09-19 RX ADMIN — INSULIN GLARGINE 10 UNITS: 100 INJECTION, SOLUTION SUBCUTANEOUS at 20:09

## 2023-09-19 RX ADMIN — HYDRALAZINE HYDROCHLORIDE 25 MG: 25 TABLET, FILM COATED ORAL at 21:29

## 2023-09-19 RX ADMIN — INSULIN LISPRO 4 UNITS: 100 INJECTION, SOLUTION INTRAVENOUS; SUBCUTANEOUS at 13:10

## 2023-09-19 RX ADMIN — QUETIAPINE FUMARATE 12.5 MG: 25 TABLET ORAL at 08:50

## 2023-09-19 RX ADMIN — HYDRALAZINE HYDROCHLORIDE 25 MG: 25 TABLET, FILM COATED ORAL at 13:10

## 2023-09-19 RX ADMIN — SODIUM CHLORIDE, PRESERVATIVE FREE 10 ML: 5 INJECTION INTRAVENOUS at 08:50

## 2023-09-19 RX ADMIN — APIXABAN 5 MG: 5 TABLET, FILM COATED ORAL at 08:50

## 2023-09-19 RX ADMIN — QUETIAPINE FUMARATE 12.5 MG: 25 TABLET ORAL at 20:07

## 2023-09-19 RX ADMIN — APIXABAN 5 MG: 5 TABLET, FILM COATED ORAL at 20:07

## 2023-09-19 RX ADMIN — CARVEDILOL 25 MG: 25 TABLET, FILM COATED ORAL at 16:04

## 2023-09-19 ASSESSMENT — PAIN SCALES - GENERAL: PAINLEVEL_OUTOF10: 0

## 2023-09-19 NOTE — PROGRESS NOTES
Comprehensive Nutrition Assessment    Type and Reason for Visit:  Reassess    Nutrition Recommendations/Plan:   Continue CC4 STEWART diet and encourage po intakes  Continue glucerna BID  1800 ml FR  Monitor nutrition adequacy, pertinent labs, bowel habits, wt changes, and clinical progress     Malnutrition Assessment:  Malnutrition Status: At risk for malnutrition (Comment) (09/19/23 8013)      Nutrition Assessment:    Follow up: Pt continues on CC4 STEWART diet w 1800 ml FR and glucerna BID. Majority po intakes % per EMR. Pt seemed confused at time of visit, difficult to obtain information. Reports having an appetite and being able to eat her meals. COREY ONS intake, pt reports being willing to try them. Wt appears to be trending down per EMR. Continue current recommendations and encourage po intakes as able. Will continue to monitor. Nutrition Related Findings:    -259 x24 hrs. x1 BM 9/16. Nonpitting BUE edema. Trace BLE edema. Wound Type: None       Current Nutrition Intake & Therapies:    Average Meal Intake: %, 51-75%, 0%  Average Supplements Intake: Unable to assess  ADULT DIET; Regular; 4 carb choices (60 gm/meal); No Added Salt (3-4 gm); 1800 ml  ADULT ORAL NUTRITION SUPPLEMENT; Breakfast, Dinner; Diabetic Oral Supplement    Anthropometric Measures:  Height: 5' 3\" (160 cm)  Ideal Body Weight (IBW): 115 lbs (52 kg)       Current Body Weight: 168 lb 6.9 oz (76.4 kg),   IBW. Weight Source: Standing Scale  Current BMI (kg/m2): 29.8        Weight Adjustment For: No Adjustment                 BMI Categories: Obese Class 1 (BMI 30.0-34. 9)    Estimated Daily Nutrient Needs:  Energy Requirements Based On: Kcal/kg (25-30)  Weight Used for Energy Requirements: Ideal  Energy (kcal/day): 9016-9645 kcals  Weight Used for Protein Requirements: Ideal (1-1.2)  Protein (g/day): 52-62 g  Method Used for Fluid Requirements: Other (Comment)  Fluid (ml/day): 1800 ml FR    Nutrition Diagnosis:   Inadequate oral intake related to inadequate protein-energy intake as evidenced by intake 0-25%, intake 51-75%, weight loss    Nutrition Interventions:   Food and/or Nutrient Delivery: Continue Current Diet, Continue Oral Nutrition Supplement  Nutrition Education/Counseling: Education not appropriate  Coordination of Nutrition Care: Continue to monitor while inpatient       Goals:  Previous Goal Met: Progressing toward Goal(s)  Goals: PO intake 50% or greater, prior to discharge       Nutrition Monitoring and Evaluation:   Behavioral-Environmental Outcomes: None Identified  Food/Nutrient Intake Outcomes: Food and Nutrient Intake, Supplement Intake  Physical Signs/Symptoms Outcomes: Biochemical Data, Meal Time Behavior, Nutrition Focused Physical Findings    Discharge Planning:    Continue current diet, Continue Oral Nutrition Supplement     Renato Alejandra [place]  Contact: Office: [phone]; [address]: [phone]

## 2023-09-19 NOTE — PROGRESS NOTES
Assessment complete. VSS. Confused. Avasure at bedside for pt safety. Bed alarm activated. Call light in reach.

## 2023-09-19 NOTE — PROGRESS NOTES
Physician Progress Note      PATIENT:               Almita Govea  CSN #:                  [id number]  :                       1950  ADMIT DATE:       2023 12:39 PM  [address] [facility] DATE:  RESPONDING  PROVIDER #:        Ann Marie Rivas TEXT:    Pt admitted with CHF exacerbation. Noted documentation of \"PNA\" per ED and   cardiology consultant notes. Pt noted on po Doxycycline while admitted. If   possible, please document in progress notes and discharge summary:    The medical record reflects the following:  Risk Factors: CHF, PNA, DM2, dementia, THELMA/CKD  Clinical Indicators: per ED and cardiology consult notes-\" treated for   pneumonia w antibiotics and lasix for edema. as symptoms didn't improve, she   was sent to ED\"  noted on po Doxycycline 100 mg bid ordered  through  per [facility]  PCT 0.11  Treatment: Doxycycline 100 mg po bid - per [facility], labs, supportive care   during CHF admission    Thank you, Benedicto Prasad RN BSN CCDS CRCMARÍA [email or web address]  Options provided:  -- PNA confirmed, pt is completing abx treatment of PNA during current   admission  -- PNA ruled out  -- Other - I will add my own diagnosis  -- Disagree - Not applicable / Not valid  -- Disagree - Clinically unable to determine / Unknown  -- Refer to Clinical Documentation Reviewer    PROVIDER RESPONSE TEXT:    The diagnosis of PNA was ruled out.     Query created by: Daniel Thompson on 2023 3:59 PM      Electronically signed by:  Hazel Márquez 2023 8:20 AM

## 2023-09-19 NOTE — CARE COORDINATION
Chart reviewed day 7. Care provided by nephro and IM. Pt is from LTC at [facility] and will return when medically ready. BUN and creat are elevated at 58 and 2.0 and hypertensive at 162/80 today. Will continue to follow course for needs.  Patrick Sahu RN

## 2023-09-19 NOTE — PLAN OF CARE
Patient's EF (Ejection Fraction) is greater than 40%    Heart Failure Medications:  Diuretics[de-identified] None Diuretics on hold d/t THELMA    (One of the following REQUIRED for EF </= 40%/SYSTOLIC FAILURE but MAY be used in EF% >40%/DIASTOLIC FAILURE)        ACE[de-identified] None on hold        ARB[de-identified] None on hold        ARNI[de-identified] None    (Beta Blockers)  NON- Evidenced Based Beta Blocker (for EF% >40%/DIASTOLIC FAILURE): Other    Evidenced Based Beta Blocker::(REQUIRED for EF% <40%/SYSTOLIC FAILURE) Carvedilol- Coreg  . .................................................................................................................................................. Healthy Weight Tracking - BMI + Meds 9/12/2023 9/13/2023 9/14/2023 9/15/2023 9/16/2023 9/17/2023 9/18/2023   Weight 210 lb 175 lb 9.6 oz 174 lb 3.2 oz 170 lb 171 lb 11.8 oz 170 lb 13.7 oz 168 lb 6.4 oz   Height 5' 3\" - - - - - -   Body Mass Index 37.2 kg/m2 31.11 kg/m2 30.86 kg/m2 30.11 kg/m2 30.42 kg/m2 30.27 kg/m2 29.83 kg/m2   Some recent data might be hidden         Patient's weights and intake/output reviewed: Yes    Daily Weight log at bedside, patient/family participation in use of log: pt unable to participate\" (confusion)    Patient's Last Weight: 168 lbs obtained by Lift Scale. Difference of 4 lbs less than last documented weight.       Intake/Output Summary (Last 24 hours) at 9/19/2023 1459  Last data filed at 9/19/2023 1310  Gross per 24 hour   Intake 480 ml   Output --   Net 480 ml       Education Booklet Provided: yes    Comorbidities Reviewed Yes    Patient has a past medical history of Amenorrhea, Anxiety, Carpal tunnel syndrome of right wrist, CHF (congestive heart failure) ([address]), Dementia ([address]), Depression, Fibromyalgia, Gastritis, HTN (hypertension), Hypertension, Migraines, Narcolepsy without cataplexy(347.00), Overweight(278.02), Restless legs syndrome, TIA (transient ischemic attack), and Type II or unspecified type diabetes mellitus without mention of

## 2023-09-20 LAB
ANION GAP SERPL CALCULATED.3IONS-SCNC: 8 MMOL/L (ref 3–16)
BUN SERPL-MCNC: 52 MG/DL (ref 7–20)
CALCIUM SERPL-MCNC: 8.6 MG/DL (ref 8.3–10.6)
CHLORIDE SERPL-SCNC: 109 MMOL/L (ref 99–110)
CO2 SERPL-SCNC: 25 MMOL/L (ref 21–32)
CREAT SERPL-MCNC: 1.9 MG/DL (ref 0.6–1.2)
EST. AVERAGE GLUCOSE BLD GHB EST-MCNC: 145.6 MG/DL
GFR SERPLBLD CREATININE-BSD FMLA CKD-EPI: 27 ML/MIN/{1.73_M2}
GLUCOSE BLD-MCNC: 112 MG/DL (ref 70–99)
GLUCOSE BLD-MCNC: 117 MG/DL (ref 70–99)
GLUCOSE BLD-MCNC: 138 MG/DL (ref 70–99)
GLUCOSE BLD-MCNC: 222 MG/DL (ref 70–99)
GLUCOSE SERPL-MCNC: 115 MG/DL (ref 70–99)
HBA1C MFR BLD: 6.7 %
PERFORMED ON: ABNORMAL
POTASSIUM SERPL-SCNC: 4.4 MMOL/L (ref 3.5–5.1)
SODIUM SERPL-SCNC: 142 MMOL/L (ref 136–145)

## 2023-09-20 PROCEDURE — 80048 BASIC METABOLIC PNL TOTAL CA: CPT

## 2023-09-20 PROCEDURE — 6370000000 HC RX 637 (ALT 250 FOR IP): Performed by: NURSE PRACTITIONER

## 2023-09-20 PROCEDURE — 97110 THERAPEUTIC EXERCISES: CPT

## 2023-09-20 PROCEDURE — 6360000002 HC RX W HCPCS: Performed by: NURSE PRACTITIONER

## 2023-09-20 PROCEDURE — 1200000000 HC SEMI PRIVATE

## 2023-09-20 PROCEDURE — 2580000003 HC RX 258: Performed by: INTERNAL MEDICINE

## 2023-09-20 PROCEDURE — 6370000000 HC RX 637 (ALT 250 FOR IP): Performed by: STUDENT IN AN ORGANIZED HEALTH CARE EDUCATION/TRAINING PROGRAM

## 2023-09-20 PROCEDURE — 36415 COLL VENOUS BLD VENIPUNCTURE: CPT

## 2023-09-20 PROCEDURE — 2580000003 HC RX 258: Performed by: STUDENT IN AN ORGANIZED HEALTH CARE EDUCATION/TRAINING PROGRAM

## 2023-09-20 RX ORDER — LORAZEPAM 2 MG/ML
0.5 INJECTION INTRAMUSCULAR ONCE
Status: COMPLETED | OUTPATIENT
Start: 2023-09-20 | End: 2023-09-20

## 2023-09-20 RX ORDER — ZIPRASIDONE MESYLATE 20 MG/ML
10 INJECTION, POWDER, LYOPHILIZED, FOR SOLUTION INTRAMUSCULAR ONCE
Status: COMPLETED | OUTPATIENT
Start: 2023-09-20 | End: 2023-09-20

## 2023-09-20 RX ORDER — INSULIN GLARGINE 100 [IU]/ML
10 INJECTION, SOLUTION SUBCUTANEOUS 2 TIMES DAILY
Status: DISCONTINUED | OUTPATIENT
Start: 2023-09-20 | End: 2023-09-23 | Stop reason: HOSPADM

## 2023-09-20 RX ADMIN — SODIUM CHLORIDE, PRESERVATIVE FREE 10 ML: 5 INJECTION INTRAVENOUS at 21:03

## 2023-09-20 RX ADMIN — INSULIN GLARGINE 10 UNITS: 100 INJECTION, SOLUTION SUBCUTANEOUS at 10:30

## 2023-09-20 RX ADMIN — CARVEDILOL 25 MG: 25 TABLET, FILM COATED ORAL at 08:55

## 2023-09-20 RX ADMIN — ROPINIROLE HYDROCHLORIDE 0.5 MG: 0.5 TABLET, FILM COATED ORAL at 21:03

## 2023-09-20 RX ADMIN — QUETIAPINE FUMARATE 12.5 MG: 25 TABLET ORAL at 08:55

## 2023-09-20 RX ADMIN — CARVEDILOL 25 MG: 25 TABLET, FILM COATED ORAL at 18:36

## 2023-09-20 RX ADMIN — BUSPIRONE HYDROCHLORIDE 10 MG: 5 TABLET ORAL at 21:03

## 2023-09-20 RX ADMIN — HYDRALAZINE HYDROCHLORIDE 25 MG: 25 TABLET, FILM COATED ORAL at 15:20

## 2023-09-20 RX ADMIN — Medication 6 MG: at 21:04

## 2023-09-20 RX ADMIN — SODIUM CHLORIDE 1000 ML: 9 INJECTION, SOLUTION INTRAVENOUS at 11:00

## 2023-09-20 RX ADMIN — APIXABAN 5 MG: 5 TABLET, FILM COATED ORAL at 08:55

## 2023-09-20 RX ADMIN — BUSPIRONE HYDROCHLORIDE 10 MG: 5 TABLET ORAL at 08:55

## 2023-09-20 RX ADMIN — ACETAMINOPHEN 650 MG: 325 TABLET ORAL at 21:02

## 2023-09-20 RX ADMIN — ATORVASTATIN CALCIUM 80 MG: 80 TABLET, FILM COATED ORAL at 21:03

## 2023-09-20 RX ADMIN — ZIPRASIDONE MESYLATE 10 MG: 20 INJECTION, POWDER, LYOPHILIZED, FOR SOLUTION INTRAMUSCULAR at 01:01

## 2023-09-20 RX ADMIN — HYDRALAZINE HYDROCHLORIDE 25 MG: 25 TABLET, FILM COATED ORAL at 21:02

## 2023-09-20 RX ADMIN — LORAZEPAM 0.5 MG: 2 INJECTION INTRAMUSCULAR; INTRAVENOUS at 02:58

## 2023-09-20 RX ADMIN — APIXABAN 5 MG: 5 TABLET, FILM COATED ORAL at 21:03

## 2023-09-20 RX ADMIN — ACETAMINOPHEN 650 MG: 325 TABLET ORAL at 00:52

## 2023-09-20 RX ADMIN — SODIUM CHLORIDE: 9 INJECTION, SOLUTION INTRAVENOUS at 02:28

## 2023-09-20 RX ADMIN — INSULIN GLARGINE 10 UNITS: 100 INJECTION, SOLUTION SUBCUTANEOUS at 21:05

## 2023-09-20 RX ADMIN — HYDRALAZINE HYDROCHLORIDE 25 MG: 25 TABLET, FILM COATED ORAL at 05:25

## 2023-09-20 RX ADMIN — QUETIAPINE FUMARATE 12.5 MG: 25 TABLET ORAL at 21:03

## 2023-09-20 ASSESSMENT — PAIN SCALES - GENERAL: PAINLEVEL_OUTOF10: 3

## 2023-09-20 ASSESSMENT — PAIN DESCRIPTION - LOCATION: LOCATION: GENERALIZED

## 2023-09-20 NOTE — PROGRESS NOTES
Physical Therapy  Facility/Department: [facility] - MED SURG  Daily Treatment Note  NAME: Miya Magaña  : 1950  MRN: [id number]    Date of Service: 2023    Discharge Recommendations:  [address] with PT   PT Equipment Recommendations  Other: TBD pending progress  [facility] 6 Clicks Inpatient Mobility:  AM-PAC Basic Mobility - Inpatient   How much help is needed turning from your back to your side while in a flat bed without using bedrails?: A Little  How much help is needed moving from lying on your back to sitting on the side of a flat bed without using bedrails?: A Little  How much help is needed moving to and from a bed to a chair?: A Lot  How much help is needed standing up from a chair using your arms?: A Lot  How much help is needed walking in hospital room?: A Lot  How much help is needed climbing 3-5 steps with a railing?: Total  AM-PAC Inpatient Mobility Raw Score : 13  AM-PAC Inpatient T-Scale Score : 36.74  Mobility Inpatient CMS 0-100% Score: 64.91  Mobility Inpatient CMS G-Code Modifier : CL    Patient Diagnosis(es): The primary encounter diagnosis was Acute congestive heart failure, unspecified heart failure type ([address]). A diagnosis of Shortness of breath was also pertinent to this visit. Assessment   Assessment: Pt very lethargice and difficult to arouse. RN reported medications to help pt relax and that pt had been sleeping all morning. Pt woke and reported no pain but unable to keep eyes open. PT facilitated ROM of legs and pt participated a few reps but then reported pain (stiffness). Pt with increasing agitation and requesting to stop. Activity Tolerance: Patient tolerated evaluation without incident;Treatment limited secondary to decreased cognition  Other: TBD pending progress     Plan    Physcial Therapy Plan  General Plan: 2-3 times per week  Current Treatment Recommendations: Strengthening;Balance training;Functional mobility training;Transfer training; Endurance training;Gait

## 2023-09-20 NOTE — PLAN OF CARE
Patient's EF (Ejection Fraction) is greater than 40%    Heart Failure Medications:  Diuretics[de-identified] None    (One of the following REQUIRED for EF </= 40%/SYSTOLIC FAILURE but MAY be used in EF% >40%/DIASTOLIC FAILURE)        ACE[de-identified] None        ARB[de-identified] None         ARNI[de-identified] None    (Beta Blockers)  NON- Evidenced Based Beta Blocker (for EF% >40%/DIASTOLIC FAILURE): None    Evidenced Based Beta Blocker::(REQUIRED for EF% <40%/SYSTOLIC FAILURE) Carvedilol- Coreg  . .................................................................................................................................................. Healthy Weight Tracking - BMI + Meds 9/13/2023 9/14/2023 9/15/2023 9/16/2023 9/17/2023 9/18/2023 9/20/2023   Weight 175 lb 9.6 oz 174 lb 3.2 oz 170 lb 171 lb 11.8 oz 170 lb 13.7 oz 168 lb 6.4 oz 171 lb 1.2 oz   Height - - - - - - -   Body Mass Index 31.11 kg/m2 30.86 kg/m2 30.11 kg/m2 30.42 kg/m2 30.27 kg/m2 29.83 kg/m2 30.3 kg/m2   Some recent data might be hidden         Patient's weights and intake/output reviewed: Yes    Daily Weight log at bedside, patient/family participation in use of log: \"yes    Patient's Last Weight: 171 lbs obtained by standing scale. Difference of 3 lbs more than last documented weight. No intake or output data in the 24 hours ending 09/20/23 2586    Education Booklet Provided: yes    Comorbidities Reviewed Yes    Patient has a past medical history of Amenorrhea, Anxiety, Carpal tunnel syndrome of right wrist, CHF (congestive heart failure) ([address]), Dementia ([address]), Depression, Fibromyalgia, Gastritis, HTN (hypertension), Hypertension, Migraines, Narcolepsy without cataplexy(347.00), Overweight(278.02), Restless legs syndrome, TIA (transient ischemic attack), and Type II or unspecified type diabetes mellitus without mention of complication, not stated as uncontrolled.      >>For CHF and Comorbidity documentation on Education Time and Topics, please see Education Tab    Progressive Mobility

## 2023-09-20 NOTE — CARE COORDINATION
Chart reviewed day 8. Care provided by nephro and IM. Pt is from LTC at [facility] and will return when ready. BUN and creat trending down at 52 and 1.9 today. Decreasing IVF's and waiting for BUN and creat to get closer to baseline for D/C. Will continue to follow course for needs.  Lynn Myers RN HUI on patient VM to call me back.    ----- Message from Portia Fletcher MA sent at 3/9/2018  9:12 AM EST -----  Regarding: FW: REQUEST CALL BACK FROM PORTIA  Contact: [phone]      ----- Message -----     From: Layla Christian     Sent: 3/9/2018   8:54 AM       [facility]  Subject: REQUEST CALL BACK FROM PORTIA                   Patient would like for portia to call her back. They have been discussing a medication and the patient said that she has stopped taking it.    Thanks!  layla

## 2023-09-20 NOTE — PROGRESS NOTES
Pt making continued attempts to remove IV. Pt has removed 8 Ivs since admission. Pt not redirectable. Md ordered Geodon and medication given. Pt daughter updated.

## 2023-09-20 NOTE — PROGRESS NOTES
Patient's EF (Ejection Fraction) is greater than 40%    Heart Failure Medications:  Diuretics[de-identified] None    (One of the following REQUIRED for EF </= 40%/SYSTOLIC FAILURE but MAY be used in EF% >40%/DIASTOLIC FAILURE)        ACE[de-identified] None        ARB[de-identified] None         ARNI[de-identified] None    (Beta Blockers)  NON- Evidenced Based Beta Blocker (for EF% >40%/DIASTOLIC FAILURE): None    Evidenced Based Beta Blocker::(REQUIRED for EF% <40%/SYSTOLIC FAILURE) Carvedilol- Coreg  . .................................................................................................................................................. Healthy Weight Tracking - BMI + Meds 9/13/2023 9/14/2023 9/15/2023 9/16/2023 9/17/2023 9/18/2023 9/20/2023   Weight 175 lb 9.6 oz 174 lb 3.2 oz 170 lb 171 lb 11.8 oz 170 lb 13.7 oz 168 lb 6.4 oz 171 lb 1.2 oz   Height - - - - - - -   Body Mass Index 31.11 kg/m2 30.86 kg/m2 30.11 kg/m2 30.42 kg/m2 30.27 kg/m2 29.83 kg/m2 30.3 kg/m2   Some recent data might be hidden         Patient's weights and intake/output reviewed: Yes    Daily Weight log at bedside, patient/family participation in use of log: \"yes    Patient's Last Weight: 171 lbs obtained by standing scale. Difference of 3 lbs more than last documented weight. 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      >>For CHF and Comorbidity

## 2023-09-21 ENCOUNTER — APPOINTMENT (OUTPATIENT)
Dept: GENERAL RADIOLOGY | Age: 73
DRG: 291 | End: 2023-09-21
Payer: MEDICARE

## 2023-09-21 LAB
ANION GAP SERPL CALCULATED.3IONS-SCNC: 8 MMOL/L (ref 3–16)
BASOPHILS # BLD: 0.1 K/UL (ref 0–0.2)
BASOPHILS NFR BLD: 0.7 %
BUN SERPL-MCNC: 42 MG/DL (ref 7–20)
CALCIUM SERPL-MCNC: 8.7 MG/DL (ref 8.3–10.6)
CHLORIDE SERPL-SCNC: 107 MMOL/L (ref 99–110)
CO2 SERPL-SCNC: 25 MMOL/L (ref 21–32)
CREAT SERPL-MCNC: 1.7 MG/DL (ref 0.6–1.2)
DEPRECATED RDW RBC AUTO: 13.3 % (ref 12.4–15.4)
EOSINOPHIL # BLD: 0.3 K/UL (ref 0–0.6)
EOSINOPHIL NFR BLD: 4.8 %
GFR SERPLBLD CREATININE-BSD FMLA CKD-EPI: 31 ML/MIN/{1.73_M2}
GLUCOSE BLD-MCNC: 138 MG/DL (ref 70–99)
GLUCOSE BLD-MCNC: 194 MG/DL (ref 70–99)
GLUCOSE BLD-MCNC: 204 MG/DL (ref 70–99)
GLUCOSE BLD-MCNC: 268 MG/DL (ref 70–99)
GLUCOSE SERPL-MCNC: 272 MG/DL (ref 70–99)
HCT VFR BLD AUTO: 28.1 % (ref 36–48)
HGB BLD-MCNC: 9.3 G/DL (ref 12–16)
LYMPHOCYTES # BLD: 0.8 K/UL (ref 1–5.1)
LYMPHOCYTES NFR BLD: 11.5 %
MCH RBC QN AUTO: 29.6 PG (ref 26–34)
MCHC RBC AUTO-ENTMCNC: 33.1 G/DL (ref 31–36)
MCV RBC AUTO: 89.2 FL (ref 80–100)
MONOCYTES # BLD: 0.3 K/UL (ref 0–1.3)
MONOCYTES NFR BLD: 4.4 %
NEUTROPHILS # BLD: 5.6 K/UL (ref 1.7–7.7)
NEUTROPHILS NFR BLD: 78.6 %
PERFORMED ON: ABNORMAL
PLATELET # BLD AUTO: 222 K/UL (ref 135–450)
PMV BLD AUTO: 9.4 FL (ref 5–10.5)
POTASSIUM SERPL-SCNC: 5.1 MMOL/L (ref 3.5–5.1)
RBC # BLD AUTO: 3.15 M/UL (ref 4–5.2)
SODIUM SERPL-SCNC: 140 MMOL/L (ref 136–145)
WBC # BLD AUTO: 7.1 K/UL (ref 4–11)

## 2023-09-21 PROCEDURE — 36415 COLL VENOUS BLD VENIPUNCTURE: CPT

## 2023-09-21 PROCEDURE — 6370000000 HC RX 637 (ALT 250 FOR IP): Performed by: NURSE PRACTITIONER

## 2023-09-21 PROCEDURE — 71045 X-RAY EXAM CHEST 1 VIEW: CPT

## 2023-09-21 PROCEDURE — 1200000000 HC SEMI PRIVATE

## 2023-09-21 PROCEDURE — 6370000000 HC RX 637 (ALT 250 FOR IP): Performed by: STUDENT IN AN ORGANIZED HEALTH CARE EDUCATION/TRAINING PROGRAM

## 2023-09-21 PROCEDURE — 80048 BASIC METABOLIC PNL TOTAL CA: CPT

## 2023-09-21 PROCEDURE — 85025 COMPLETE CBC W/AUTO DIFF WBC: CPT

## 2023-09-21 RX ADMIN — HYDRALAZINE HYDROCHLORIDE 25 MG: 25 TABLET, FILM COATED ORAL at 05:03

## 2023-09-21 RX ADMIN — QUETIAPINE FUMARATE 12.5 MG: 25 TABLET ORAL at 22:25

## 2023-09-21 RX ADMIN — BUSPIRONE HYDROCHLORIDE 10 MG: 5 TABLET ORAL at 22:25

## 2023-09-21 RX ADMIN — INSULIN GLARGINE 10 UNITS: 100 INJECTION, SOLUTION SUBCUTANEOUS at 22:26

## 2023-09-21 RX ADMIN — HYDRALAZINE HYDROCHLORIDE 25 MG: 25 TABLET, FILM COATED ORAL at 13:25

## 2023-09-21 RX ADMIN — CARVEDILOL 25 MG: 25 TABLET, FILM COATED ORAL at 09:19

## 2023-09-21 RX ADMIN — QUETIAPINE FUMARATE 12.5 MG: 25 TABLET ORAL at 09:19

## 2023-09-21 RX ADMIN — INSULIN GLARGINE 10 UNITS: 100 INJECTION, SOLUTION SUBCUTANEOUS at 09:19

## 2023-09-21 RX ADMIN — BUSPIRONE HYDROCHLORIDE 10 MG: 5 TABLET ORAL at 09:19

## 2023-09-21 RX ADMIN — HYDRALAZINE HYDROCHLORIDE 25 MG: 25 TABLET, FILM COATED ORAL at 22:25

## 2023-09-21 RX ADMIN — ROPINIROLE HYDROCHLORIDE 0.5 MG: 0.5 TABLET, FILM COATED ORAL at 22:25

## 2023-09-21 RX ADMIN — CARVEDILOL 25 MG: 25 TABLET, FILM COATED ORAL at 20:23

## 2023-09-21 RX ADMIN — APIXABAN 5 MG: 5 TABLET, FILM COATED ORAL at 22:25

## 2023-09-21 RX ADMIN — APIXABAN 5 MG: 5 TABLET, FILM COATED ORAL at 09:19

## 2023-09-21 RX ADMIN — ATORVASTATIN CALCIUM 80 MG: 80 TABLET, FILM COATED ORAL at 22:25

## 2023-09-21 RX ADMIN — INSULIN LISPRO 4 UNITS: 100 INJECTION, SOLUTION INTRAVENOUS; SUBCUTANEOUS at 13:25

## 2023-09-21 ASSESSMENT — PAIN SCALES - GENERAL: PAINLEVEL_OUTOF10: 0

## 2023-09-21 NOTE — PROGRESS NOTES
Patient's EF (Ejection Fraction) is greater than 40%    Heart Failure Medications:  Diuretics[de-identified] None    (One of the following REQUIRED for EF </= 40%/SYSTOLIC FAILURE but MAY be used in EF% >40%/DIASTOLIC FAILURE)        ACE[de-identified] None        ARB[de-identified] None         ARNI[de-identified] None    (Beta Blockers)  NON- Evidenced Based Beta Blocker (for EF% >40%/DIASTOLIC FAILURE): None    Evidenced Based Beta Blocker::(REQUIRED for EF% <40%/SYSTOLIC FAILURE) Carvedilol- Coreg  . .................................................................................................................................................. Healthy Weight Tracking - BMI + Meds 9/14/2023 9/15/2023 9/16/2023 9/17/2023 9/18/2023 9/20/2023 9/21/2023   Weight 174 lb 3.2 oz 170 lb 171 lb 11.8 oz 170 lb 13.7 oz 168 lb 6.4 oz 171 lb 1.2 oz 175 lb 12.8 oz   Height - - - - - - -   Body Mass Index 30.86 kg/m2 30.11 kg/m2 30.42 kg/m2 30.27 kg/m2 29.83 kg/m2 30.3 kg/m2 31.14 kg/m2   Some recent data might be hidden         Patient's weights and intake/output reviewed: Yes    Daily Weight log at bedside, patient/family participation in use of log: \"yes    Patient's Last Weight: 175  lbs obtained by standing scale. Difference of 4 lbs more than last documented weight. 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      >>For CHF and

## 2023-09-21 NOTE — PLAN OF CARE
Problem: Chronic Conditions and Co-morbidities  Goal: Patient's chronic conditions and co-morbidity symptoms are monitored and maintained or improved  Outcome: Progressing     Patient's EF (Ejection Fraction) is greater than 40%    Heart Failure Medications:  Diuretics[de-identified] None    (One of the following REQUIRED for EF </= 40%/SYSTOLIC FAILURE but MAY be used in EF% >40%/DIASTOLIC FAILURE)        ACE[de-identified] None        ARB[de-identified] None         ARNI[de-identified] None    (Beta Blockers)  NON- Evidenced Based Beta Blocker (for EF% >40%/DIASTOLIC FAILURE): None    Evidenced Based Beta Blocker::(REQUIRED for EF% <40%/SYSTOLIC FAILURE) Carvedilol- Coreg  . .................................................................................................................................................. Healthy Weight Tracking - BMI + Meds 9/14/2023 9/15/2023 9/16/2023 9/17/2023 9/18/2023 9/20/2023 9/21/2023   Weight 174 lb 3.2 oz 170 lb 171 lb 11.8 oz 170 lb 13.7 oz 168 lb 6.4 oz 171 lb 1.2 oz 175 lb 12.8 oz   Height - - - - - - -   Body Mass Index 30.86 kg/m2 30.11 kg/m2 30.42 kg/m2 30.27 kg/m2 29.83 kg/m2 30.3 kg/m2 31.14 kg/m2   Some recent data might be hidden         Patient's weights and intake/output reviewed: Yes    Daily Weight log at bedside, patient/family participation in use of log: pt unable to participate\" (confusion)    Patient's Last Weight: 175 lbs obtained by standing scale. Difference of 4 lbs more than last documented weight.       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 >>For CHF and Comorbidity documentation on Education Time and Topics, please see Education Tab    Progressive Mobility Assessment:  What is this patient's Current Level of Mobility?: stedy  How was this patient Mobilized today?: Edge of Bed, Up to Chair, Bedside Commode, and  Up to Toilet/Shower,                  With Whom? Nurse, PCA, PT, and OT                 Level of Difficulty/Assistance: 1x Assist     Pt resting in bed at this time on room air. Pt with complaints of shortness of breath. Pt with nonpitting lower extremity edema.      Patient and/or Family's stated Goal of Care this Admission: reduce shortness of breath, increase activity tolerance, better understand heart failure and disease management, be more comfortable, and reduce lower extremity edema prior to discharge        :

## 2023-09-21 NOTE — PROGRESS NOTES
Results   Component Value Date/Time    LABURIN  07/20/2018 10:56 AM     <50,000 CFU/ml mixed skin/urogenital rey.  No further workup     Blood Cultures: No results found for: \"BC\"  No results found for: \"BLOODCULT2\"  Organism:   Lab Results   Component Value Date/Time    ORG Proteus species 03/08/2018 02:35 PM         Federico Vizcaino, APRN - CNP

## 2023-09-21 NOTE — PROGRESS NOTES
[facility] staff called writer to inform that pt was chewing on IV tubing. When writer entered room IV had been removed. NP notified as the pt has had numerous IVs this admission and repeatedly pulls them out.

## 2023-09-21 NOTE — CARE COORDINATION
LOS 9. Care managed by [facility]. Here w CHF. From [facility]. CM following.  Laurie Bennett RN

## 2023-09-22 LAB
ANION GAP SERPL CALCULATED.3IONS-SCNC: 9 MMOL/L (ref 3–16)
BUN SERPL-MCNC: 35 MG/DL (ref 7–20)
CALCIUM SERPL-MCNC: 8.7 MG/DL (ref 8.3–10.6)
CHLORIDE SERPL-SCNC: 108 MMOL/L (ref 99–110)
CO2 SERPL-SCNC: 25 MMOL/L (ref 21–32)
CREAT SERPL-MCNC: 1.4 MG/DL (ref 0.6–1.2)
DEPRECATED RDW RBC AUTO: 13.4 % (ref 12.4–15.4)
GFR SERPLBLD CREATININE-BSD FMLA CKD-EPI: 40 ML/MIN/{1.73_M2}
GLUCOSE BLD-MCNC: 121 MG/DL (ref 70–99)
GLUCOSE BLD-MCNC: 184 MG/DL (ref 70–99)
GLUCOSE BLD-MCNC: 187 MG/DL (ref 70–99)
GLUCOSE BLD-MCNC: 292 MG/DL (ref 70–99)
GLUCOSE SERPL-MCNC: 117 MG/DL (ref 70–99)
HCT VFR BLD AUTO: 28.8 % (ref 36–48)
HGB BLD-MCNC: 9.7 G/DL (ref 12–16)
MCH RBC QN AUTO: 29.5 PG (ref 26–34)
MCHC RBC AUTO-ENTMCNC: 33.7 G/DL (ref 31–36)
MCV RBC AUTO: 87.5 FL (ref 80–100)
NT-PROBNP SERPL-MCNC: 5237 PG/ML (ref 0–124)
PERFORMED ON: ABNORMAL
PLATELET # BLD AUTO: 247 K/UL (ref 135–450)
PMV BLD AUTO: 9 FL (ref 5–10.5)
POTASSIUM SERPL-SCNC: 4.2 MMOL/L (ref 3.5–5.1)
RBC # BLD AUTO: 3.29 M/UL (ref 4–5.2)
SODIUM SERPL-SCNC: 142 MMOL/L (ref 136–145)
WBC # BLD AUTO: 7.7 K/UL (ref 4–11)

## 2023-09-22 PROCEDURE — 80048 BASIC METABOLIC PNL TOTAL CA: CPT

## 2023-09-22 PROCEDURE — 36415 COLL VENOUS BLD VENIPUNCTURE: CPT

## 2023-09-22 PROCEDURE — 6370000000 HC RX 637 (ALT 250 FOR IP): Performed by: STUDENT IN AN ORGANIZED HEALTH CARE EDUCATION/TRAINING PROGRAM

## 2023-09-22 PROCEDURE — 97110 THERAPEUTIC EXERCISES: CPT

## 2023-09-22 PROCEDURE — 51798 US URINE CAPACITY MEASURE: CPT

## 2023-09-22 PROCEDURE — 85027 COMPLETE CBC AUTOMATED: CPT

## 2023-09-22 PROCEDURE — 6360000002 HC RX W HCPCS: Performed by: NURSE PRACTITIONER

## 2023-09-22 PROCEDURE — 83880 ASSAY OF NATRIURETIC PEPTIDE: CPT

## 2023-09-22 PROCEDURE — 6370000000 HC RX 637 (ALT 250 FOR IP): Performed by: NURSE PRACTITIONER

## 2023-09-22 PROCEDURE — 1200000000 HC SEMI PRIVATE

## 2023-09-22 RX ORDER — ZIPRASIDONE MESYLATE 20 MG/ML
10 INJECTION, POWDER, LYOPHILIZED, FOR SOLUTION INTRAMUSCULAR ONCE
Status: COMPLETED | OUTPATIENT
Start: 2023-09-22 | End: 2023-09-22

## 2023-09-22 RX ADMIN — HYDRALAZINE HYDROCHLORIDE 25 MG: 25 TABLET, FILM COATED ORAL at 18:50

## 2023-09-22 RX ADMIN — ATORVASTATIN CALCIUM 80 MG: 80 TABLET, FILM COATED ORAL at 21:06

## 2023-09-22 RX ADMIN — BUSPIRONE HYDROCHLORIDE 10 MG: 5 TABLET ORAL at 10:33

## 2023-09-22 RX ADMIN — APIXABAN 5 MG: 5 TABLET, FILM COATED ORAL at 10:34

## 2023-09-22 RX ADMIN — ZIPRASIDONE MESYLATE 10 MG: 20 INJECTION, POWDER, LYOPHILIZED, FOR SOLUTION INTRAMUSCULAR at 23:42

## 2023-09-22 RX ADMIN — HYDRALAZINE HYDROCHLORIDE 25 MG: 25 TABLET, FILM COATED ORAL at 06:21

## 2023-09-22 RX ADMIN — APIXABAN 5 MG: 5 TABLET, FILM COATED ORAL at 21:06

## 2023-09-22 RX ADMIN — QUETIAPINE FUMARATE 12.5 MG: 25 TABLET ORAL at 21:06

## 2023-09-22 RX ADMIN — HYDRALAZINE HYDROCHLORIDE 25 MG: 25 TABLET, FILM COATED ORAL at 21:06

## 2023-09-22 RX ADMIN — INSULIN GLARGINE 10 UNITS: 100 INJECTION, SOLUTION SUBCUTANEOUS at 10:36

## 2023-09-22 RX ADMIN — ROPINIROLE HYDROCHLORIDE 0.5 MG: 0.5 TABLET, FILM COATED ORAL at 21:06

## 2023-09-22 RX ADMIN — QUETIAPINE FUMARATE 12.5 MG: 25 TABLET ORAL at 10:33

## 2023-09-22 RX ADMIN — INSULIN GLARGINE 10 UNITS: 100 INJECTION, SOLUTION SUBCUTANEOUS at 21:07

## 2023-09-22 RX ADMIN — CARVEDILOL 25 MG: 25 TABLET, FILM COATED ORAL at 18:50

## 2023-09-22 RX ADMIN — BUSPIRONE HYDROCHLORIDE 10 MG: 5 TABLET ORAL at 21:06

## 2023-09-22 RX ADMIN — CARVEDILOL 25 MG: 25 TABLET, FILM COATED ORAL at 10:43

## 2023-09-22 ASSESSMENT — PAIN DESCRIPTION - LOCATION: LOCATION: HEAD;THROAT

## 2023-09-22 ASSESSMENT — PAIN SCALES - GENERAL: PAINLEVEL_OUTOF10: 0

## 2023-09-22 NOTE — PLAN OF CARE
Patient's EF (Ejection Fraction) is greater than 40%    Heart Failure Medications:  Diuretics[de-identified] None    (One of the following REQUIRED for EF </= 40%/SYSTOLIC FAILURE but MAY be used in EF% >40%/DIASTOLIC FAILURE)        ACE[de-identified] None        ARB[de-identified] None         ARNI[de-identified] None    (Beta Blockers)  NON- Evidenced Based Beta Blocker (for EF% >40%/DIASTOLIC FAILURE): None    Evidenced Based Beta Blocker::(REQUIRED for EF% <40%/SYSTOLIC FAILURE) Carvedilol- Coreg  . .................................................................................................................................................. Healthy Weight Tracking - BMI + Meds 9/14/2023 9/15/2023 9/16/2023 9/17/2023 9/18/2023 9/20/2023 9/21/2023   Weight 174 lb 3.2 oz 170 lb 171 lb 11.8 oz 170 lb 13.7 oz 168 lb 6.4 oz 171 lb 1.2 oz 175 lb 12.8 oz   Height - - - - - - -   Body Mass Index 30.86 kg/m2 30.11 kg/m2 30.42 kg/m2 30.27 kg/m2 29.83 kg/m2 30.3 kg/m2 31.14 kg/m2   Some recent data might be hidden         Patient's weights and intake/output reviewed: Yes    Daily Weight log at bedside, patient/family participation in use of log: pt unable to participate\" ( )    Patient's Last Weight: 175 lb 12 oz obtained by standing scale Today's weight is 177 lb 8 oz obtained by lift scale    Difference of 2.6 lbs greater than last documented weight.       Intake/Output Summary (Last 24 hours) at 9/22/2023 0519  Last data filed at 9/21/2023 1827  Gross per 24 hour   Intake 200 ml   Output --   Net 200 ml       Education Booklet Provided: yes    Comorbidities Reviewed Yes    Patient has a past medical history of Amenorrhea, Anxiety, Carpal tunnel syndrome of right wrist, CHF (congestive heart failure) ([address]), Dementia ([address]), Depression, Fibromyalgia, Gastritis, HTN (hypertension), Hypertension, Migraines, Narcolepsy without cataplexy(347.00), Overweight(278.02), Restless legs syndrome, TIA (transient ischemic attack), and Type II or unspecified type diabetes mellitus

## 2023-09-22 NOTE — CARE COORDINATION
LOS 10. Care managed by [facility] radha THELMA, CHF. From [address] [facility] return. CM following.   Lance Cooper RN

## 2023-09-22 NOTE — PROGRESS NOTES
Physical Therapy  Facility/Department: [facility] B3 - MED SURG  Physical Therapy Daily Treatment Note    Name: Zully Rapp  : 1950  MRN: [id number]  Date of Service: 2023    Discharge Recommendations:  [address] [place] [address] with PT   PT Equipment Recommendations  Other: TBD pending progress      Patient Diagnosis(es): The primary encounter diagnosis was Acute congestive heart failure, unspecified heart failure type ([address]). A diagnosis of Shortness of breath was also pertinent to this visit. 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 Past Surgical History:  has a past surgical history that includes Cholecystectomy (). Assessment Pt lethargic this date and agreeable to bed level therex. Pt performed therex with verbal/tactile cues and cues to redirect attention throughout session. Pt will cont to benefit from skilled PT services to improve function towards goals. pt will benefit from LTC with PT at [place]. Activity Tolerance  Activity Tolerance: Patient tolerated evaluation without incident;Treatment limited secondary to decreased cognition     Plan   Physcial Therapy Plan  General Plan: 2-3 times per week  Current Treatment Recommendations: Strengthening, Balance training, Functional mobility training, Transfer training, Endurance training, Gait training, Stair training, Home exercise program, Safety education & training, Patient/Caregiver education & training, Equipment evaluation, education, & procurement, Therapeutic activities  Safety Devices  Type of Devices:  All fall risk

## 2023-09-22 NOTE — PLAN OF CARE
Problem: Skin/Tissue Integrity  Goal: Absence of new skin breakdown  Description: 1. Monitor for areas of redness and/or skin breakdown  2. Assess vascular access sites hourly  3. Every 4-6 hours minimum:  Change oxygen saturation probe site  4. Every 4-6 hours:  If on nasal continuous positive airway pressure, respiratory therapy assess nares and determine need for appliance change or resting period. Outcome: Progressing     Problem: Confusion  Goal: Confusion, delirium, dementia, or psychosis is improved or at baseline  Description: INTERVENTIONS:  1. Assess for possible contributors to thought disturbance, including medications, impaired vision or hearing, underlying metabolic abnormalities, dehydration, psychiatric diagnoses, and notify attending LIP  2. [place] high risk fall precautions, as indicated  3. Provide frequent short contacts to provide reality reorientation, refocusing and direction  4. Decrease environmental stimuli, including noise as appropriate  5. Monitor and intervene to maintain adequate nutrition, hydration, elimination, sleep and activity  6. If unable to ensure safety without constant attention obtain sitter and review sitter guidelines with assigned personnel  7.  Initiate Psychosocial CNS and Spiritual Care consult, as indicated  Outcome: Progressing     Problem: Safety - Adult  Goal: Free from fall injury  Outcome: Progressing     Problem: Chronic Conditions and Co-morbidities  Goal: Patient's chronic conditions and co-morbidity symptoms are monitored and maintained or improved  Outcome: Progressing

## 2023-09-23 VITALS
DIASTOLIC BLOOD PRESSURE: 55 MMHG | SYSTOLIC BLOOD PRESSURE: 115 MMHG | HEART RATE: 72 BPM | TEMPERATURE: 98 F | WEIGHT: 179.01 LBS | HEIGHT: 63 IN | RESPIRATION RATE: 20 BRPM | BODY MASS INDEX: 31.72 KG/M2 | OXYGEN SATURATION: 90 %

## 2023-09-23 LAB
ANION GAP SERPL CALCULATED.3IONS-SCNC: 9 MMOL/L (ref 3–16)
BUN SERPL-MCNC: 30 MG/DL (ref 7–20)
CALCIUM SERPL-MCNC: 9.2 MG/DL (ref 8.3–10.6)
CHLORIDE SERPL-SCNC: 105 MMOL/L (ref 99–110)
CO2 SERPL-SCNC: 26 MMOL/L (ref 21–32)
CREAT SERPL-MCNC: 1.4 MG/DL (ref 0.6–1.2)
DEPRECATED RDW RBC AUTO: 13.4 % (ref 12.4–15.4)
GFR SERPLBLD CREATININE-BSD FMLA CKD-EPI: 40 ML/MIN/{1.73_M2}
GLUCOSE BLD-MCNC: 352 MG/DL (ref 70–99)
GLUCOSE SERPL-MCNC: 241 MG/DL (ref 70–99)
HCT VFR BLD AUTO: 34.6 % (ref 36–48)
HGB BLD-MCNC: 11.6 G/DL (ref 12–16)
MCH RBC QN AUTO: 29.2 PG (ref 26–34)
MCHC RBC AUTO-ENTMCNC: 33.5 G/DL (ref 31–36)
MCV RBC AUTO: 87.2 FL (ref 80–100)
PERFORMED ON: ABNORMAL
PLATELET # BLD AUTO: 284 K/UL (ref 135–450)
PMV BLD AUTO: 8.6 FL (ref 5–10.5)
POTASSIUM SERPL-SCNC: 4.4 MMOL/L (ref 3.5–5.1)
RBC # BLD AUTO: 3.97 M/UL (ref 4–5.2)
SODIUM SERPL-SCNC: 140 MMOL/L (ref 136–145)
WBC # BLD AUTO: 9.5 K/UL (ref 4–11)

## 2023-09-23 PROCEDURE — 85027 COMPLETE CBC AUTOMATED: CPT

## 2023-09-23 PROCEDURE — 2500000003 HC RX 250 WO HCPCS: Performed by: NURSE PRACTITIONER

## 2023-09-23 PROCEDURE — 6370000000 HC RX 637 (ALT 250 FOR IP): Performed by: STUDENT IN AN ORGANIZED HEALTH CARE EDUCATION/TRAINING PROGRAM

## 2023-09-23 PROCEDURE — 36415 COLL VENOUS BLD VENIPUNCTURE: CPT

## 2023-09-23 PROCEDURE — 6370000000 HC RX 637 (ALT 250 FOR IP): Performed by: NURSE PRACTITIONER

## 2023-09-23 PROCEDURE — 80048 BASIC METABOLIC PNL TOTAL CA: CPT

## 2023-09-23 RX ORDER — CARVEDILOL 25 MG/1
25 TABLET ORAL 2 TIMES DAILY WITH MEALS
Qty: 60 TABLET | Refills: 3 | DISCHARGE
Start: 2023-09-23

## 2023-09-23 RX ORDER — BUSPIRONE HYDROCHLORIDE 10 MG/1
10 TABLET ORAL 2 TIMES DAILY
DISCHARGE
Start: 2023-09-23

## 2023-09-23 RX ORDER — TRAMADOL HYDROCHLORIDE 50 MG/1
50 TABLET ORAL EVERY 12 HOURS PRN
Refills: 0 | Status: SHIPPED | OUTPATIENT
Start: 2023-09-23 | End: 2023-09-28

## 2023-09-23 RX ORDER — OLANZAPINE 10 MG/1
10 INJECTION, POWDER, LYOPHILIZED, FOR SOLUTION INTRAMUSCULAR ONCE
Status: COMPLETED | OUTPATIENT
Start: 2023-09-23 | End: 2023-09-23

## 2023-09-23 RX ORDER — AMLODIPINE BESYLATE 5 MG/1
10 TABLET ORAL DAILY
Status: DISCONTINUED | OUTPATIENT
Start: 2023-09-23 | End: 2023-09-23 | Stop reason: HOSPADM

## 2023-09-23 RX ORDER — HYDRALAZINE HYDROCHLORIDE 25 MG/1
25 TABLET, FILM COATED ORAL EVERY 8 HOURS SCHEDULED
Qty: 90 TABLET | Refills: 3 | DISCHARGE
Start: 2023-09-23

## 2023-09-23 RX ORDER — QUETIAPINE FUMARATE 25 MG/1
12.5 TABLET, FILM COATED ORAL 2 TIMES DAILY
Qty: 60 TABLET | Refills: 3 | DISCHARGE
Start: 2023-09-23

## 2023-09-23 RX ORDER — ROPINIROLE 0.5 MG/1
0.5 TABLET, FILM COATED ORAL NIGHTLY
Qty: 90 TABLET | Refills: 3 | DISCHARGE
Start: 2023-09-23

## 2023-09-23 RX ADMIN — INSULIN GLARGINE 10 UNITS: 100 INJECTION, SOLUTION SUBCUTANEOUS at 09:05

## 2023-09-23 RX ADMIN — QUETIAPINE FUMARATE 12.5 MG: 25 TABLET ORAL at 09:05

## 2023-09-23 RX ADMIN — AMLODIPINE BESYLATE 10 MG: 5 TABLET ORAL at 10:25

## 2023-09-23 RX ADMIN — OLANZAPINE 10 MG: 10 INJECTION, POWDER, FOR SOLUTION INTRAMUSCULAR at 00:58

## 2023-09-23 RX ADMIN — HYDRALAZINE HYDROCHLORIDE 25 MG: 25 TABLET, FILM COATED ORAL at 09:08

## 2023-09-23 RX ADMIN — INSULIN LISPRO 2 UNITS: 100 INJECTION, SOLUTION INTRAVENOUS; SUBCUTANEOUS at 09:05

## 2023-09-23 RX ADMIN — APIXABAN 5 MG: 5 TABLET, FILM COATED ORAL at 09:05

## 2023-09-23 RX ADMIN — CARVEDILOL 25 MG: 25 TABLET, FILM COATED ORAL at 09:06

## 2023-09-23 RX ADMIN — BUSPIRONE HYDROCHLORIDE 10 MG: 5 TABLET ORAL at 09:05

## 2023-09-23 RX ADMIN — HYDRALAZINE HYDROCHLORIDE 25 MG: 25 TABLET, FILM COATED ORAL at 14:14

## 2023-09-23 RX ADMIN — INSULIN LISPRO 6 UNITS: 100 INJECTION, SOLUTION INTRAVENOUS; SUBCUTANEOUS at 12:25

## 2023-09-23 ASSESSMENT — PAIN SCALES - GENERAL: PAINLEVEL_OUTOF10: 0

## 2023-09-23 NOTE — PROGRESS NOTES
Phone report called to Mejia at [facility]. All questions answered.

## 2023-09-23 NOTE — PLAN OF CARE
Problem: Confusion  Goal: Confusion, delirium, dementia, or psychosis is improved or at baseline  Description: INTERVENTIONS:  1. Assess for possible contributors to thought disturbance, including medications, impaired vision or hearing, underlying metabolic abnormalities, dehydration, psychiatric diagnoses, and notify attending LIP  2. [place] high risk fall precautions, as indicated  3. Provide frequent short contacts to provide reality reorientation, refocusing and direction  4. Decrease environmental stimuli, including noise as appropriate  5. Monitor and intervene to maintain adequate nutrition, hydration, elimination, sleep and activity  6. If unable to ensure safety without constant attention obtain sitter and review sitter guidelines with assigned personnel  7.  Initiate Psychosocial CNS and Spiritual Care consult, as indicated  9/22/2023 2228 by Larisa Bashir RN  Outcome: Progressing  Flowsheets (Taken 9/22/2023 2228)  Effect of thought disturbance (confusion, delirium, dementia, or psychosis) are managed with adequate functional status:   Assess for contributors to thought disturbance, including medications, impaired vision or hearing, underlying metabolic abnormalities, dehydration, psychiatric diagnoses, notify [address] high risk fall precautions, as indicated   Provide frequent short contacts to provide reality reorientation, refocusing and direction   Decrease environmental stimuli, including noise as appropriate   Monitor and intervene to maintain adequate nutrition, hydration, elimination, sleep and activity

## 2023-09-23 NOTE — PROGRESS NOTES
Pt ok for discharge per MD. Discharge instructions and script given to transport. Report called to facility. IV removed. No questions or concerns at this time. Transported by stretcher to [facility].

## 2023-09-23 NOTE — DISCHARGE SUMMARY
demonstrates a normal noncontrast appearance, however evaluation is limited in the absence of intravenous contrast. Biliary system/Gallbladder: No intrahepatic or extrahepatic biliary ductal dilation. The gallbladder is not well visualized and likely surgically absent. Spleen: Normal. Pancreas: No evidence of pancreatic lesions or pancreatic ductal dilation. Adrenals: No suspicious adrenal nodules are present. Kidneys/Bladder: The kidneys demonstrate no acute abnormality or hydronephrosis, however the absence of intravenous contrast limits evaluation. The urinary bladder is moderately distended. Pelvic organs: The uterus is present. No suspicious adnexal masses are evident. GI tract: The stomach is normal in appearance. The intestines demonstrate no evidence of focal thickening or obstruction. The appendix is not well visualized, however there are no secondary signs to suggest appendicitis. Peritoneum: No evidence of ascites or peritoneal nodularity. Lymph nodes: No evidence of suspicious lymphadenopathy. Vasculature: Vascular calcifications are present along the abdominal aorta without aneurysmal dilation. Soft Tissues/Bones: No acute soft tissue abnormalities are evident. Multilevel degenerative joint disease is present along the visualized spine. No acute osseous abnormalities or suspicious lesions are present. CHEST: Trace right and small left pleural effusions with associated passive atelectasis of the lower lobes. Focal area of partially calcified nodularity along the left major fissure, likely sequelae from granulomatous disease. ABDOMEN/PELVIS: No evidence of hydronephrosis or obstructive nephrolithiasis. Moderately distended urinary bladder.      XR CHEST PORTABLE    Result Date: 9/15/2023  EXAMINATION: ONE XRAY VIEW OF THE CHEST 9/15/2023 12:08 pm COMPARISON: September 12, 2023 HISTORY: ORDERING SYSTEM PROVIDED HISTORY: Eval pleural effusions TECHNOLOGIST PROVIDED HISTORY: Reason for exam:->Eval pleural effusions FINDINGS: Cardiomediastinal silhouette is stable. Left subclavian cardiac pacemaker is in place. Decreasing perihilar edema with persistent small bibasilar pleural effusions. No pneumothorax. Degenerative changes of the spine and left shoulder. Persistent but decreasing perihilar edema. Persistent and slightly improved small bibasilar pleural effusions. US RENAL COMPLETE    Result Date: 9/14/2023  EXAMINATION: RETROPERITONEAL ULTRASOUND OF THE KIDNEYS AND URINARY BLADDER 9/14/2023 COMPARISON: None HISTORY: ORDERING SYSTEM PROVIDED HISTORY: moose on CKD TECHNOLOGIST PROVIDED HISTORY: Reason for exam:->moose on CKD FINDINGS: Kidneys: The right kidney measures 10.9 cm in length and the left kidney measures 9.7 cm in length. Renal echogenicity is normal.  There is mild right hydronephrosis. There are no visible stones. Bladder: Unremarkable appearance of the bladder. Mild right hydronephrosis. XR CHEST (2 VW)    Result Date: 9/12/2023  EXAMINATION: TWO XRAY VIEWS OF THE CHEST 9/12/2023 1:03 pm COMPARISON: 08/18/2022 HISTORY: ORDERING SYSTEM PROVIDED HISTORY: SOB TECHNOLOGIST PROVIDED HISTORY: Reason for exam:->SOB FINDINGS: There are small to moderate bilateral pleural effusions with atelectasis, right greater than left. There is mild pulmonary vascular congestion. The cardiac silhouette is stable status post dual lead pacemaker. There is no pneumothorax. 1. Congestive heart failure.        Consults:     IP CONSULT TO HEART FAILURE NURSE/COORDINATOR  IP CONSULT TO DIETITIAN  IP CONSULT TO CARDIOLOGY  IP CONSULT TO NEPHROLOGY  IP CONSULT TO PALLIATIVE CARE    Labs:     Recent Labs     09/21/23  1154 09/22/23  0653 09/23/23  0644   WBC 7.1 7.7 9.5   HGB 9.3* 9.7* 11.6*   HCT 28.1* 28.8* 34.6*    247 284     Recent Labs     09/21/23  1154 09/22/23  0653 09/23/23  0644    142 140   K 5.1 4.2 4.4    108 105   CO2 25 25 26   BUN 42* 35* 30*   CREATININE 1.7* 1.4* 1.4*

## 2023-09-23 NOTE — DISCHARGE INSTR - COC
Continuity of Care Form    Patient Name: Kimmy Goodman   :  1950  MRN:  [id number]    Admit date:  2023  Discharge date:  23    Code Status Order: Limited   Advance Directives:     Admitting Physician: Sharyl Sever, MD  PCP: Fay Claire MD    Discharging Nurse: Tamia Christine RN  [facility] Unit/Room#: 4283/6613-31  Discharging Unit Phone Number: [phone]    Emergency Contact:   Extended Emergency Contact Information  Primary Emergency Contact: Meeta [facility] [address] [address] Phone: [phone]  Relation: [facility]  Secondary Emergency Contact: [address] Phone: [phone]  Relation: Spouse    Past Surgical History:  Past Surgical History:   Procedure Laterality Date    CHOLECYSTECTOMY         Immunization History:   Immunization History   Administered Date(s) Administered    Influenza 2013    Influenza Virus Vaccine 10/01/2011, 2014    Influenza, High Dose (Fluzone 65 yrs and older) 2015, 10/28/2016, 2017    Pneumococcal Conjugate 7-valent (Osiris Nay) 2007    Pneumococcal, PCV-13, PREVNAR 15, (age 6w+), IM, 0.5mL 2016    Tetanus 1995    Zoster Live (Zostavax) 2012       Active Problems:  Patient Active Problem List   Diagnosis Code    Type 2 diabetes mellitus ([address]) E11.9    Anxiety F41.9    Depression F32. A    Fibromyalgia M79.7    Restless legs syndrome G25.81    Narcolepsy without cataplexy G47.419    Migraines G43.909    Gastritis K29.70    Patient overweight E66.3    Amenorrhea N91.2    Uncontrolled hypertension I10    Carpal tunnel syndrome of right wrist G56.01    Dementia (HCC) F03.90    Type 2 diabetes mellitus with diabetic neuropathy (HCC) E11.40    CHF (congestive heart failure), NYHA class I, acute on chronic, combined (HCC) I50.43    Acute congestive heart failure (HCC) I50.9    Coronary artery disease involving native coronary artery of native heart without therapy. Ventilator:    - No ventilator support    Rehab Therapies: Physical Therapy and Occupational Therapy  Weight Bearing Status/Restrictions: No weight bearing restrictions  Other Medical Equipment (for information only, NOT a DME order):  wheelchair, Sherral [place] if needed  Other Treatments:     Patient's personal belongings (please select all that are sent with patient):  Glasses    RN SIGNATURE:  Electronically signed by Brian Garcia RN on 9/23/23 at 10:55 AM EDT    CASE MANAGEMENT/SOCIAL WORK SECTION    Inpatient Status Date: 9/12    Readmission Risk Assessment Score:  Readmission Risk              Risk of Unplanned Readmission:  27           Discharging to Facility/ Agency   Name: [facility]  Address:  Phone: [phone]  Fax:    Dialysis Facility (if applicable)   Name:  Address:  Dialysis Schedule:  Phone:  Fax:    / signature: Electronically signed by Kenzie Arrieta RN on 9/23/23 at 10:02 AM EDT    PHYSICIAN SECTION    Prognosis: Fair    Condition at Discharge: Stable    Rehab Potential (if transferring to Rehab): Fair    Recommended Labs or Other Treatments After Discharge: Weekly [facility]    Physician Certification: I certify the above information and transfer of Raoul Oswald  is necessary for the continuing treatment of the diagnosis listed and that she requires [address] for greater 30 days.      Update Admission H&P: No change in H&P    PHYSICIAN SIGNATURE:  Electronically signed by OKSANA Ovalles CNP on 9/23/23 at 9:21 AM EDT

## 2023-09-23 NOTE — PLAN OF CARE
Patient's EF (Ejection Fraction) is greater than 40%    Heart Failure Medications:  Diuretics[de-identified] None    (One of the following REQUIRED for EF </= 40%/SYSTOLIC FAILURE but MAY be used in EF% >40%/DIASTOLIC FAILURE)        ACE[de-identified] None        ARB[de-identified] None         ARNI[de-identified] None    (Beta Blockers)  NON- Evidenced Based Beta Blocker (for EF% >40%/DIASTOLIC FAILURE): None    Evidenced Based Beta Blocker::(REQUIRED for EF% <40%/SYSTOLIC FAILURE) Carvedilol- Coreg  . .................................................................................................................................................. Healthy Weight Tracking - BMI + Meds 9/15/2023 9/16/2023 9/17/2023 9/18/2023 9/20/2023 9/21/2023 9/22/2023   Weight 170 lb 171 lb 11.8 oz 170 lb 13.7 oz 168 lb 6.4 oz 171 lb 1.2 oz 175 lb 12.8 oz 177 lb 8 oz   Height - - - - - - -   Body Mass Index 30.11 kg/m2 30.42 kg/m2 30.27 kg/m2 29.83 kg/m2 30.3 kg/m2 31.14 kg/m2 31.44 kg/m2   Some recent data might be hidden         Patient's weights and intake/output reviewed: Yes    Daily Weight log at bedside, patient/family participation in use of log: \"yes    Patient's Last Weight: Patient's Last Weight: 179 lbs obtained by bed scale. Difference of 2 lbs more than last documented weight. Intake/Output Summary (Last 24 hours) at 9/23/2023 7992  Last data filed at 9/23/2023 0524  Gross per 24 hour   Intake 420 ml   Output 1800 ml   Net -1380 ml       Education Booklet Provided: yes    Comorbidities Reviewed Yes    Patient has a past medical history of Amenorrhea, Anxiety, Carpal tunnel syndrome of right wrist, CHF (congestive heart failure) ([address]), Dementia ([address]), Depression, Fibromyalgia, Gastritis, HTN (hypertension), Hypertension, Migraines, Narcolepsy without cataplexy(347.00), Overweight(278.02), Restless legs syndrome, TIA (transient ischemic attack), and Type II or unspecified type diabetes mellitus without mention of complication, not stated as uncontrolled.      >>For

## 2023-09-23 NOTE — CARE COORDINATION
CASE MANAGEMENT DISCHARGE SUMMARY      Discharge to: LTC @ [address] [facility] completed: [id number] [facility] Exemption Notification (HENS) completed: N/A    Transportation:    Family/car: no   Medical Transport explained to [employer]. Pt/family voice no agency preference. Agency used: Gregory Cruz up time: 1600   Ambulance form completed: Yes    Confirmed discharge plan with:     Patient: yes per RN     Family:  yes dtr Hermila Jammie [phone]     Facility/Agency, name: [facility] faxed   Phone number for report to facility: [phone], name: Nicholas Khan RN    Note: Discharging nurse to complete LEATHA, reconcile AVS, and place final copy with patient's discharge packet. RN to ensure that written prescriptions for  Level II medications are sent with patient to the facility as per protocol.

## 2023-12-16 ENCOUNTER — HOSPITAL ENCOUNTER (EMERGENCY)
Age: 73
Discharge: HOME | End: 2023-12-16
Attending: STUDENT IN AN ORGANIZED HEALTH CARE EDUCATION/TRAINING PROGRAM
Payer: MEDICARE

## 2023-12-16 ENCOUNTER — APPOINTMENT (OUTPATIENT)
Dept: GENERAL RADIOLOGY | Age: 73
End: 2023-12-16
Payer: MEDICARE

## 2023-12-16 VITALS
SYSTOLIC BLOOD PRESSURE: 162 MMHG | HEART RATE: 70 BPM | RESPIRATION RATE: 23 BRPM | DIASTOLIC BLOOD PRESSURE: 93 MMHG | OXYGEN SATURATION: 90 % | BODY MASS INDEX: 31.71 KG/M2 | HEIGHT: 63 IN | TEMPERATURE: 97.6 F | WEIGHT: 179 LBS

## 2023-12-16 DIAGNOSIS — I50.9 ACUTE ON CHRONIC CONGESTIVE HEART FAILURE, UNSPECIFIED HEART FAILURE TYPE (HCC): Primary | ICD-10-CM

## 2023-12-16 DIAGNOSIS — J45.901 REACTIVE AIRWAY DISEASE WITH ACUTE EXACERBATION, UNSPECIFIED ASTHMA SEVERITY, UNSPECIFIED WHETHER PERSISTENT: ICD-10-CM

## 2023-12-16 LAB
ALBUMIN SERPL-MCNC: 3.6 G/DL (ref 3.4–5)
ALBUMIN/GLOB SERPL: 2.3 {RATIO} (ref 1.1–2.2)
ALP SERPL-CCNC: 76 U/L (ref 40–129)
ALT SERPL-CCNC: 15 U/L (ref 10–40)
ANION GAP SERPL CALCULATED.3IONS-SCNC: 11 MMOL/L (ref 3–16)
AST SERPL-CCNC: 13 U/L (ref 15–37)
BACTERIA URNS QL MICRO: ABNORMAL /HPF
BASE EXCESS BLDV CALC-SCNC: -1.6 MMOL/L (ref -3–3)
BASOPHILS # BLD: 0 K/UL (ref 0–0.2)
BASOPHILS NFR BLD: 0.5 %
BILIRUB SERPL-MCNC: <0.2 MG/DL (ref 0–1)
BILIRUB UR QL STRIP.AUTO: NEGATIVE
BUN SERPL-MCNC: 21 MG/DL (ref 7–20)
CALCIUM SERPL-MCNC: 8.5 MG/DL (ref 8.3–10.6)
CHLORIDE SERPL-SCNC: 108 MMOL/L (ref 99–110)
CLARITY UR: CLEAR
CO2 BLDV-SCNC: 26 MMOL/L
CO2 SERPL-SCNC: 24 MMOL/L (ref 21–32)
COHGB MFR BLDV: 3 % (ref 0–1.5)
COLOR UR: YELLOW
CREAT SERPL-MCNC: 1.4 MG/DL (ref 0.6–1.2)
DEPRECATED RDW RBC AUTO: 14 % (ref 12.4–15.4)
EKG ATRIAL RATE: 52 BPM
EKG DIAGNOSIS: NORMAL
EKG Q-T INTERVAL: 390 MS
EKG QRS DURATION: 116 MS
EKG QTC CALCULATION (BAZETT): 421 MS
EKG R AXIS: 89 DEGREES
EKG T AXIS: -80 DEGREES
EKG VENTRICULAR RATE: 70 BPM
EOSINOPHIL # BLD: 0.2 K/UL (ref 0–0.6)
EOSINOPHIL NFR BLD: 3.3 %
EPI CELLS #/AREA URNS HPF: ABNORMAL /HPF (ref 0–5)
FLUAV RNA RESP QL NAA+PROBE: NOT DETECTED
FLUBV RNA RESP QL NAA+PROBE: NOT DETECTED
GFR SERPLBLD CREATININE-BSD FMLA CKD-EPI: 40 ML/MIN/{1.73_M2}
GLUCOSE SERPL-MCNC: 150 MG/DL (ref 70–99)
GLUCOSE UR STRIP.AUTO-MCNC: 100 MG/DL
HCO3 BLDV-SCNC: 24.4 MMOL/L (ref 23–29)
HCT VFR BLD AUTO: 28.8 % (ref 36–48)
HGB BLD-MCNC: 9.5 G/DL (ref 12–16)
HGB UR QL STRIP.AUTO: ABNORMAL
INR PPP: 1.23 (ref 0.84–1.16)
KETONES UR STRIP.AUTO-MCNC: NEGATIVE MG/DL
LEUKOCYTE ESTERASE UR QL STRIP.AUTO: NEGATIVE
LIPASE SERPL-CCNC: 26 U/L (ref 13–60)
LYMPHOCYTES # BLD: 1.2 K/UL (ref 1–5.1)
LYMPHOCYTES NFR BLD: 18.1 %
MCH RBC QN AUTO: 28.2 PG (ref 26–34)
MCHC RBC AUTO-ENTMCNC: 33.1 G/DL (ref 31–36)
MCV RBC AUTO: 85 FL (ref 80–100)
METHGB MFR BLDV: 0.3 %
MONOCYTES # BLD: 0.4 K/UL (ref 0–1.3)
MONOCYTES NFR BLD: 6.9 %
NEUTROPHILS # BLD: 4.6 K/UL (ref 1.7–7.7)
NEUTROPHILS NFR BLD: 71.2 %
NITRITE UR QL STRIP.AUTO: NEGATIVE
NT-PROBNP SERPL-MCNC: 3606 PG/ML (ref 0–124)
O2 CT VFR BLDV CALC: 12 VOL %
O2 THERAPY: ABNORMAL
PCO2 BLDV: 46.7 MMHG (ref 40–50)
PH BLDV: 7.34 [PH] (ref 7.35–7.45)
PH UR STRIP.AUTO: 6 [PH] (ref 5–8)
PLATELET # BLD AUTO: 225 K/UL (ref 135–450)
PMV BLD AUTO: 7.9 FL (ref 5–10.5)
PO2 BLDV: 52 MMHG (ref 25–40)
POTASSIUM SERPL-SCNC: 4.6 MMOL/L (ref 3.5–5.1)
PROT SERPL-MCNC: 5.2 G/DL (ref 6.4–8.2)
PROT UR STRIP.AUTO-MCNC: 100 MG/DL
PROTHROMBIN TIME: 15.5 SEC (ref 11.5–14.8)
RBC # BLD AUTO: 3.38 M/UL (ref 4–5.2)
RBC #/AREA URNS HPF: ABNORMAL /HPF (ref 0–4)
SAO2 % BLDV: 84 %
SARS-COV-2 RNA RESP QL NAA+PROBE: NOT DETECTED
SODIUM SERPL-SCNC: 143 MMOL/L (ref 136–145)
SP GR UR STRIP.AUTO: 1.02 (ref 1–1.03)
TROPONIN, HIGH SENSITIVITY: 26 NG/L (ref 0–14)
UA COMPLETE W REFLEX CULTURE PNL UR: ABNORMAL
UA DIPSTICK W REFLEX MICRO PNL UR: YES
URN SPEC COLLECT METH UR: ABNORMAL
UROBILINOGEN UR STRIP-ACNC: 0.2 E.U./DL
WBC # BLD AUTO: 6.4 K/UL (ref 4–11)
WBC #/AREA URNS HPF: ABNORMAL /HPF (ref 0–5)

## 2023-12-16 PROCEDURE — 84484 ASSAY OF TROPONIN QUANT: CPT

## 2023-12-16 PROCEDURE — 6360000002 HC RX W HCPCS: Performed by: STUDENT IN AN ORGANIZED HEALTH CARE EDUCATION/TRAINING PROGRAM

## 2023-12-16 PROCEDURE — 2580000003 HC RX 258: Performed by: STUDENT IN AN ORGANIZED HEALTH CARE EDUCATION/TRAINING PROGRAM

## 2023-12-16 PROCEDURE — 82803 BLOOD GASES ANY COMBINATION: CPT

## 2023-12-16 PROCEDURE — 93010 ELECTROCARDIOGRAM REPORT: CPT | Performed by: INTERNAL MEDICINE

## 2023-12-16 PROCEDURE — 36415 COLL VENOUS BLD VENIPUNCTURE: CPT

## 2023-12-16 PROCEDURE — 93005 ELECTROCARDIOGRAM TRACING: CPT | Performed by: STUDENT IN AN ORGANIZED HEALTH CARE EDUCATION/TRAINING PROGRAM

## 2023-12-16 PROCEDURE — 80053 COMPREHEN METABOLIC PANEL: CPT

## 2023-12-16 PROCEDURE — 85610 PROTHROMBIN TIME: CPT

## 2023-12-16 PROCEDURE — 85025 COMPLETE CBC W/AUTO DIFF WBC: CPT

## 2023-12-16 PROCEDURE — 87636 SARSCOV2 & INF A&B AMP PRB: CPT

## 2023-12-16 PROCEDURE — 71045 X-RAY EXAM CHEST 1 VIEW: CPT

## 2023-12-16 PROCEDURE — 83690 ASSAY OF LIPASE: CPT

## 2023-12-16 PROCEDURE — 81001 URINALYSIS AUTO W/SCOPE: CPT

## 2023-12-16 PROCEDURE — 6370000000 HC RX 637 (ALT 250 FOR IP): Performed by: STUDENT IN AN ORGANIZED HEALTH CARE EDUCATION/TRAINING PROGRAM

## 2023-12-16 PROCEDURE — 83880 ASSAY OF NATRIURETIC PEPTIDE: CPT

## 2023-12-16 RX ORDER — FUROSEMIDE 10 MG/ML
40 INJECTION INTRAMUSCULAR; INTRAVENOUS ONCE
Status: COMPLETED | OUTPATIENT
Start: 2023-12-16 | End: 2023-12-16

## 2023-12-16 RX ORDER — ALBUTEROL SULFATE 90 UG/1
2 AEROSOL, METERED RESPIRATORY (INHALATION) 4 TIMES DAILY PRN
Qty: 18 G | Refills: 0 | Status: SHIPPED | OUTPATIENT
Start: 2023-12-16

## 2023-12-16 RX ORDER — IPRATROPIUM BROMIDE AND ALBUTEROL SULFATE 2.5; .5 MG/3ML; MG/3ML
3 SOLUTION RESPIRATORY (INHALATION) ONCE
Status: COMPLETED | OUTPATIENT
Start: 2023-12-16 | End: 2023-12-16

## 2023-12-16 RX ORDER — PREDNISONE 50 MG/1
50 TABLET ORAL DAILY
Qty: 5 TABLET | Refills: 0 | Status: SHIPPED | OUTPATIENT
Start: 2023-12-16 | End: 2023-12-21

## 2023-12-16 RX ADMIN — IPRATROPIUM BROMIDE AND ALBUTEROL SULFATE 3 DOSE: 2.5; .5 SOLUTION RESPIRATORY (INHALATION) at 08:47

## 2023-12-16 RX ADMIN — FUROSEMIDE 40 MG: 10 INJECTION, SOLUTION INTRAMUSCULAR; INTRAVENOUS at 10:22

## 2023-12-16 RX ADMIN — METHYLPREDNISOLONE SODIUM SUCCINATE 125 MG: 125 INJECTION INTRAMUSCULAR; INTRAVENOUS at 10:23

## 2023-12-16 ASSESSMENT — PAIN - FUNCTIONAL ASSESSMENT: PAIN_FUNCTIONAL_ASSESSMENT: NONE - DENIES PAIN

## 2023-12-16 NOTE — DISCHARGE INSTRUCTIONS
Return to nearest ED if you are having shortness of breath not improving with your breathing treatment, or other concerning symptoms.

## 2023-12-16 NOTE — ED PROVIDER NOTES
secondary to reactive airway disease and likely some component of CHF. Patient given a dose of Solu-Medrol as well as 40 mg IV Lasix. Patient's daughter is now at the bedside and I discussed the course with her. She reaffirms the patient is a DNR CC and that her comfort is our primary goal now. Considering this I think it is reasonable to discharge her back to [facility] with oxygen as needed, a new prescription for albuterol and a 5-day steroid burst.  Given return precautions for symptoms that cannot be controlled at the facility. Follow-up to PCP in 2 to 3 days. Is this patient to be included in the SEP-1 Core Measure due to severe sepsis or septic shock? No   Exclusion criteria - the patient is NOT to be included for SEP-1 Core Measure due to: Infection is not suspected    During the patient's ED course, the patient was given:  Medications   methylPREDNISolone sodium succ (SOLU-MEDROL) 125 mg in sterile water 2 mL injection (has no administration in time range)   furosemide (LASIX) injection 40 mg (has no administration in time range)   ipratropium 0.5 mg-albuterol 2.5 mg (DUONEB) nebulizer solution 3 Dose (3 Doses Inhalation Given 12/16/23 0847)        ITadeo DO,  am the primary clinician of record. CLINICAL IMPRESSION  1. Acute on chronic congestive heart failure, unspecified heart failure type ([address])    2. Reactive airway disease with acute exacerbation, unspecified asthma severity, unspecified whether persistent        not currently breastfeeding. DISPOSITION  Yelena Iverson was discharged to SNF  in stable condition. Patient was given scripts for the following medications. I counseled patient how to take these medications.    New Prescriptions    ALBUTEROL SULFATE HFA (VENTOLIN HFA) 108 (90 BASE) MCG/ACT INHALER    Inhale 2 puffs into the lungs 4 times daily as needed for Wheezing or Shortness of Breath    PREDNISONE (DELTASONE) 50 MG TABLET    Take 1 tablet by mouth daily for 5 days       Follow-up with:  Lea Cordoba MD  [address] [place], [place] [id number] [phone]    Call in 2 days  As needed      DISCLAIMER: This chart was created using Dragon dictation software. Efforts were made by me to ensure accuracy, however some errors may be present due to limitations of this technology and occasionally words are not transcribed correctly.      Thuan Bell DO  12/16/23 1019

## 2024-01-29 NOTE — PLAN OF CARE
[facility] - Outpatient Follow-up Note  Wen So 70 y.o. female MRN: [id number]  Encounter: [id number]    ASSESSMENT AND PLAN:      1. Esophageal adenocarcinoma (HCC)  History of esophageal cancer with metastasis to the liver on chemotherapy.  Ongoing management per oncology.  She continues to have pancytopenia with neutropenia however feels that she is having some clinical improvement.    2. Hay's pouch of intestine (HCC)  She notes small-volume rectal output over the past 24 hours, x 2 episodes.  Nonbloody, no pain.  Suspect passage of benign tissue such as cells/mucus/bacteria.  No symptoms to suggest diversion colitis.  She has follow-up with her surgeon on Friday and can discuss further with them but would monitor for now.    3. Esophageal stricture  She was dilated back in September to 12 mm.  She is tolerated p.o. without any issues.  Explained that I would not perform scope with dilation while she has low WBC/neutrophil counts due to infection risk.  Fortunately she is not having any dysphagia symptoms and is gaining weight.  Will continue to monitor closely, follow-up in few months.    4. Pancytopenia (HCC)  As noted above, would not scope with dilation in setting of pancytopenia due to infection risk. Continue PPI BID.      _______________________      Chief Complaint   Patient presents with    Hospital Follow-up     Pt has a colostomy bag but had a bowel movement 2 times in 24 hrs, her rectum is tender. She is no longer having blood in her stool.       HPI:   Romy is a 70-year-old female with history of esophageal cancer (dx 2021) with metastasis to the liver, hx esophageal stenosis, diverticulitis s/p Christy's procedure who presents as follow up from recent hospitalization for weakness. Her ostomy output was darker than usual, but on evaluation by GI team, was dark brown and not consistent with melena. She was pancytopenia at that time with WBC 0.7, Hgb  Problem: Discharge Planning  Goal: Discharge to home or other facility with appropriate resources  Outcome: Progressing     Problem: Pain  Goal: Verbalizes/displays adequate comfort level or baseline comfort level  Outcome: Progressing     Problem: Skin/Tissue Integrity  Goal: Absence of new skin breakdown  Description: 1. Monitor for areas of redness and/or skin breakdown  2. Assess vascular access sites hourly  3. Every 4-6 hours minimum:  Change oxygen saturation probe site  4. Every 4-6 hours:  If on nasal continuous positive airway pressure, respiratory therapy assess nares and determine need for appliance change or resting period. Outcome: Progressing     Problem: Confusion  Goal: Confusion, delirium, dementia, or psychosis is improved or at baseline  Description: INTERVENTIONS:  1. Assess for possible contributors to thought disturbance, including medications, impaired vision or hearing, underlying metabolic abnormalities, dehydration, psychiatric diagnoses, and notify attending LIP  2. [place] high risk fall precautions, as indicated  3. Provide frequent short contacts to provide reality reorientation, refocusing and direction  4. Decrease environmental stimuli, including noise as appropriate  5. Monitor and intervene to maintain adequate nutrition, hydration, elimination, sleep and activity  6. If unable to ensure safety without constant attention obtain sitter and review sitter guidelines with assigned personnel  7.  Initiate Psychosocial CNS and Spiritual Care consult, as indicated  Outcome: Progressing     Problem: ABCDS Injury Assessment  Goal: Absence of physical injury  Outcome: Progressing     Problem: Safety - Adult  Goal: Free from fall injury  Outcome: Progressing 7.3, Hct 22 and platelet 32. She was also neutropenic. She was transfused and treated supportively with improvement of her hemoglobin to 10 and discharged.    Since her discharge, she feels that she has been doing well, gaining some weight and eating without any issues. She continues to receive chemo for her esophageal cancer, per notes, new regimen is paclitacel/ramucirumab. Most recent labs with WBC 1.14, Hgb 9.7, platelet 98, neutrophil 0.14.    Her last EGD was in 2023 with benign stricture dilated to 12 mm. She was scheduled for repeat, but this was interrupted due to her illness/hospitalization.    Of note, she reports two episodes of discharge, small volume, per rectum in past 24 hours. No blood or pain.        Historical Information   Past Medical History:   Diagnosis Date    Diverticulitis of colon     DVT (deep venous thrombosis) (HCC)     Esophageal cancer (HCC)     Esophageal ulcer     Hypertension     Pulmonary embolism (HCC)     Tongue tied      Past Surgical History:   Procedure Laterality Date    ABDOMINAL SURGERY  10/31/2021    Colostomy    COLOSTOMY      ENDOSCOPIC ULTRASOUND (UPPER)  2022    HYSTERECTOMY  10/31/2021    MYOMECTOMY      PORTACATH PLACEMENT Right     chest    UPPER GASTROINTESTINAL ENDOSCOPY  2021     Social History     Substance and Sexual Activity   Alcohol Use Not Currently    Alcohol/week: 10.0 standard drinks of alcohol    Types: 10 Shots of liquor per week     Social History     Substance and Sexual Activity   Drug Use Never     Social History     Tobacco Use   Smoking Status Former    Current packs/day: 0.00    Average packs/day: 0.5 packs/day for 10.0 years (5.0 ttl pk-yrs)    Types: Cigarettes    Start date: 2002    Quit date: 2012    Years since quittin.0   Smokeless Tobacco Never     Family History   Problem Relation Age of Onset    Cancer Mother     Stroke Father     Dementia Father     Stomach cancer Maternal Grandfather     Colon cancer  "Neg Hx     Colon polyps Neg Hx          Current Outpatient Medications:     levothyroxine (Euthyrox) 25 mcg tablet    magnesium oxide (MAG-OX) 400 mg    metoprolol tartrate (LOPRESSOR) 25 mg tablet    metoprolol tartrate (LOPRESSOR) 25 mg tablet    pantoprazole (PROTONIX) 40 mg tablet    polyethylene glycol (GLYCOLAX) 17 GM/SCOOP powder    potassium chloride (MICRO-K) 10 MEQ CR capsule    cholecalciferol (VITAMIN D3) 1,000 units tablet    dexamethasone (DECADRON) 4 mg tablet    Eliquis 5 MG    LORazepam (ATIVAN) 0.5 mg tablet    sucralfate (CARAFATE) 1 g tablet  No Known Allergies  Reviewed medications and allergies and updated as indicated    PHYSICAL EXAM:    Blood pressure 154/76, height 5' 5\" (1.651 m), weight 62 kg (136 lb 9.6 oz). Body mass index is 22.73 kg/m².  General Appearance: NAD, cooperative, alert  Eyes: Anicteric  GI:  Soft, non-tender, non-distended; normal bowel sounds; no masses, no organomegaly   Rectal: Deferred  Musculoskeletal: No edema.  Skin:  No jaundice    Lab Results:   No results found for: \"WBC\", \"HGB\", \"MCV\", \"PLT\"  No results found for: \"NA\", \"K\", \"CL\", \"CO2\", \"ANIONGAP\", \"BUN\", \"CREATININE\", \"GLUCOSE\", \"GLUF\", \"CALCIUM\", \"CORRECTEDCA\", \"AST\", \"ALT\", \"ALKPHOS\", \"PROT\", \"BILITOT\", \"EGFR\"  No results found for: \"IRON\", \"TIBC\", \"FERRITIN\"  No results found for: \"LIPASE\"    Radiology Results:   No results found.  "

## 2024-12-31 ENCOUNTER — HOSPITAL ENCOUNTER (INPATIENT)
Age: 74
LOS: 5 days | Discharge: NURSING, REHAB OR LONG-TERM CARE | DRG: 100 | End: 2025-01-07
Attending: STUDENT IN AN ORGANIZED HEALTH CARE EDUCATION/TRAINING PROGRAM | Admitting: INTERNAL MEDICINE
Payer: MEDICARE

## 2024-12-31 ENCOUNTER — APPOINTMENT (OUTPATIENT)
Dept: CT IMAGING | Age: 74
DRG: 100 | End: 2024-12-31
Payer: MEDICARE

## 2024-12-31 ENCOUNTER — APPOINTMENT (OUTPATIENT)
Dept: GENERAL RADIOLOGY | Age: 74
DRG: 100 | End: 2024-12-31
Payer: MEDICARE

## 2024-12-31 DIAGNOSIS — R55 SYNCOPE AND COLLAPSE: ICD-10-CM

## 2024-12-31 DIAGNOSIS — R55 PRE-SYNCOPE: Primary | ICD-10-CM

## 2024-12-31 DIAGNOSIS — R56.9 SEIZURE (HCC): ICD-10-CM

## 2024-12-31 DIAGNOSIS — R53.1 LEFT-SIDED WEAKNESS: ICD-10-CM

## 2024-12-31 DIAGNOSIS — M19.90 ARTHRITIS: ICD-10-CM

## 2024-12-31 LAB
ALBUMIN SERPL-MCNC: 3.5 G/DL (ref 3.4–5)
ALBUMIN/GLOB SERPL: 1.5 {RATIO} (ref 1.1–2.2)
ALP SERPL-CCNC: 68 U/L (ref 40–129)
ALT SERPL-CCNC: 14 U/L (ref 10–40)
ANION GAP SERPL CALCULATED.3IONS-SCNC: 10 MMOL/L (ref 3–16)
APTT BLD: 28.2 SEC (ref 22.1–36.4)
AST SERPL-CCNC: 18 U/L (ref 15–37)
BASE EXCESS BLDV CALC-SCNC: -1.3 MMOL/L (ref -3–3)
BASOPHILS # BLD: 0.1 K/UL (ref 0–0.2)
BASOPHILS NFR BLD: 0.6 %
BILIRUB SERPL-MCNC: 0.4 MG/DL (ref 0–1)
BUN SERPL-MCNC: 23 MG/DL (ref 7–20)
CALCIUM SERPL-MCNC: 8.6 MG/DL (ref 8.3–10.6)
CHLORIDE SERPL-SCNC: 104 MMOL/L (ref 99–110)
CO2 BLDV-SCNC: 25 MMOL/L
CO2 SERPL-SCNC: 26 MMOL/L (ref 21–32)
COHGB MFR BLDV: 0.3 % (ref 0–1.5)
CREAT SERPL-MCNC: 1.8 MG/DL (ref 0.6–1.2)
DEPRECATED RDW RBC AUTO: 13.3 % (ref 12.4–15.4)
EKG ATRIAL RATE: 65 BPM
EKG DIAGNOSIS: NORMAL
EKG P AXIS: 42 DEGREES
EKG P-R INTERVAL: 166 MS
EKG Q-T INTERVAL: 462 MS
EKG QRS DURATION: 86 MS
EKG QTC CALCULATION (BAZETT): 480 MS
EKG R AXIS: 21 DEGREES
EKG T AXIS: 80 DEGREES
EKG VENTRICULAR RATE: 65 BPM
EOSINOPHIL # BLD: 0.1 K/UL (ref 0–0.6)
EOSINOPHIL NFR BLD: 0.7 %
GFR SERPLBLD CREATININE-BSD FMLA CKD-EPI: 29 ML/MIN/{1.73_M2}
GLUCOSE SERPL-MCNC: 97 MG/DL (ref 70–99)
HCO3 BLDV-SCNC: 23.4 MMOL/L (ref 23–29)
HCT VFR BLD AUTO: 30.8 % (ref 36–48)
HGB BLD-MCNC: 10.5 G/DL (ref 12–16)
INR PPP: 1.28 (ref 0.85–1.15)
LYMPHOCYTES # BLD: 1.4 K/UL (ref 1–5.1)
LYMPHOCYTES NFR BLD: 11.7 %
MCH RBC QN AUTO: 29.2 PG (ref 26–34)
MCHC RBC AUTO-ENTMCNC: 34.1 G/DL (ref 31–36)
MCV RBC AUTO: 85.5 FL (ref 80–100)
METHGB MFR BLDV: 0.3 %
MONOCYTES # BLD: 1 K/UL (ref 0–1.3)
MONOCYTES NFR BLD: 8 %
NEUTROPHILS # BLD: 9.7 K/UL (ref 1.7–7.7)
NEUTROPHILS NFR BLD: 79 %
O2 THERAPY: ABNORMAL
PCO2 BLDV: 39.1 MMHG (ref 40–50)
PH BLDV: 7.39 [PH] (ref 7.35–7.45)
PLATELET # BLD AUTO: 288 K/UL (ref 135–450)
PMV BLD AUTO: 7.3 FL (ref 5–10.5)
PO2 BLDV: 93.9 MMHG (ref 25–40)
POTASSIUM SERPL-SCNC: 4.3 MMOL/L (ref 3.5–5.1)
PROT SERPL-MCNC: 5.9 G/DL (ref 6.4–8.2)
PROTHROMBIN TIME: 16.1 SEC (ref 11.9–14.9)
RBC # BLD AUTO: 3.6 M/UL (ref 4–5.2)
SAO2 % BLDV: 97 %
SODIUM SERPL-SCNC: 140 MMOL/L (ref 136–145)
TROPONIN, HIGH SENSITIVITY: 29 NG/L (ref 0–14)
TROPONIN, HIGH SENSITIVITY: 30 NG/L (ref 0–14)
WBC # BLD AUTO: 12.3 K/UL (ref 4–11)

## 2024-12-31 PROCEDURE — 85730 THROMBOPLASTIN TIME PARTIAL: CPT

## 2024-12-31 PROCEDURE — 93010 ELECTROCARDIOGRAM REPORT: CPT | Performed by: INTERNAL MEDICINE

## 2024-12-31 PROCEDURE — 73030 X-RAY EXAM OF SHOULDER: CPT

## 2024-12-31 PROCEDURE — 80053 COMPREHEN METABOLIC PANEL: CPT

## 2024-12-31 PROCEDURE — 70450 CT HEAD/BRAIN W/O DYE: CPT

## 2024-12-31 PROCEDURE — 70496 CT ANGIOGRAPHY HEAD: CPT

## 2024-12-31 PROCEDURE — 99285 EMERGENCY DEPT VISIT HI MDM: CPT

## 2024-12-31 PROCEDURE — 85610 PROTHROMBIN TIME: CPT

## 2024-12-31 PROCEDURE — 71045 X-RAY EXAM CHEST 1 VIEW: CPT

## 2024-12-31 PROCEDURE — 85025 COMPLETE CBC W/AUTO DIFF WBC: CPT

## 2024-12-31 PROCEDURE — 84484 ASSAY OF TROPONIN QUANT: CPT

## 2024-12-31 PROCEDURE — 6360000004 HC RX CONTRAST MEDICATION: Performed by: PHYSICIAN ASSISTANT

## 2024-12-31 PROCEDURE — 93005 ELECTROCARDIOGRAM TRACING: CPT | Performed by: STUDENT IN AN ORGANIZED HEALTH CARE EDUCATION/TRAINING PROGRAM

## 2024-12-31 PROCEDURE — 82803 BLOOD GASES ANY COMBINATION: CPT

## 2024-12-31 RX ORDER — IOPAMIDOL 755 MG/ML
75 INJECTION, SOLUTION INTRAVASCULAR
Status: COMPLETED | OUTPATIENT
Start: 2024-12-31 | End: 2024-12-31

## 2024-12-31 RX ADMIN — IOPAMIDOL 75 ML: 755 INJECTION, SOLUTION INTRAVENOUS at 22:23

## 2024-12-31 ASSESSMENT — PAIN DESCRIPTION - DESCRIPTORS: DESCRIPTORS: ACHING

## 2024-12-31 ASSESSMENT — PAIN DESCRIPTION - ORIENTATION: ORIENTATION: LEFT

## 2024-12-31 ASSESSMENT — PAIN DESCRIPTION - LOCATION: LOCATION: SHOULDER

## 2024-12-31 ASSESSMENT — PAIN SCALES - GENERAL: PAINLEVEL_OUTOF10: 4

## 2025-01-01 PROBLEM — N17.9 AKI (ACUTE KIDNEY INJURY) (HCC): Status: ACTIVE | Noted: 2025-01-01

## 2025-01-01 PROBLEM — R55 SYNCOPE AND COLLAPSE: Status: ACTIVE | Noted: 2025-01-01

## 2025-01-01 PROBLEM — R55 PRE-SYNCOPE: Status: ACTIVE | Noted: 2025-01-01

## 2025-01-01 PROBLEM — R53.1 LEFT-SIDED WEAKNESS: Status: ACTIVE | Noted: 2025-01-01

## 2025-01-01 PROBLEM — N30.01 ACUTE CYSTITIS WITH HEMATURIA: Status: ACTIVE | Noted: 2025-01-01

## 2025-01-01 PROBLEM — R29.90 STROKE-LIKE SYMPTOMS: Status: ACTIVE | Noted: 2025-01-01

## 2025-01-01 PROBLEM — Z86.69 HX OF SEIZURE DISORDER: Status: ACTIVE | Noted: 2025-01-01

## 2025-01-01 LAB
ANION GAP SERPL CALCULATED.3IONS-SCNC: 8 MMOL/L (ref 3–16)
BACTERIA URNS QL MICRO: ABNORMAL /HPF
BILIRUB UR QL STRIP.AUTO: NEGATIVE
BUN SERPL-MCNC: 21 MG/DL (ref 7–20)
CALCIUM SERPL-MCNC: 8.4 MG/DL (ref 8.3–10.6)
CHLORIDE SERPL-SCNC: 106 MMOL/L (ref 99–110)
CLARITY UR: CLEAR
CO2 SERPL-SCNC: 26 MMOL/L (ref 21–32)
COLOR UR: YELLOW
CREAT SERPL-MCNC: 1.8 MG/DL (ref 0.6–1.2)
DEPRECATED RDW RBC AUTO: 13.3 % (ref 12.4–15.4)
EPI CELLS #/AREA URNS HPF: ABNORMAL /HPF (ref 0–5)
GFR SERPLBLD CREATININE-BSD FMLA CKD-EPI: 29 ML/MIN/{1.73_M2}
GLUCOSE BLD-MCNC: 101 MG/DL (ref 70–99)
GLUCOSE BLD-MCNC: 109 MG/DL (ref 70–99)
GLUCOSE BLD-MCNC: 161 MG/DL (ref 70–99)
GLUCOSE BLD-MCNC: 207 MG/DL (ref 70–99)
GLUCOSE SERPL-MCNC: 92 MG/DL (ref 70–99)
GLUCOSE UR STRIP.AUTO-MCNC: 250 MG/DL
HCT VFR BLD AUTO: 28.8 % (ref 36–48)
HGB BLD-MCNC: 10 G/DL (ref 12–16)
HGB UR QL STRIP.AUTO: ABNORMAL
KETONES UR STRIP.AUTO-MCNC: NEGATIVE MG/DL
LEUKOCYTE ESTERASE UR QL STRIP.AUTO: ABNORMAL
MCH RBC QN AUTO: 30.1 PG (ref 26–34)
MCHC RBC AUTO-ENTMCNC: 34.7 G/DL (ref 31–36)
MCV RBC AUTO: 86.8 FL (ref 80–100)
NITRITE UR QL STRIP.AUTO: NEGATIVE
PERFORMED ON: ABNORMAL
PH UR STRIP.AUTO: 6 [PH] (ref 5–8)
PLATELET # BLD AUTO: 256 K/UL (ref 135–450)
PMV BLD AUTO: 7.3 FL (ref 5–10.5)
POTASSIUM SERPL-SCNC: 4.5 MMOL/L (ref 3.5–5.1)
PROT UR STRIP.AUTO-MCNC: >=300 MG/DL
RBC # BLD AUTO: 3.32 M/UL (ref 4–5.2)
RBC #/AREA URNS HPF: ABNORMAL /HPF (ref 0–4)
SODIUM SERPL-SCNC: 140 MMOL/L (ref 136–145)
SP GR UR STRIP.AUTO: 1.01 (ref 1–1.03)
UA COMPLETE W REFLEX CULTURE PNL UR: YES
UA DIPSTICK W REFLEX MICRO PNL UR: YES
URN SPEC COLLECT METH UR: ABNORMAL
UROBILINOGEN UR STRIP-ACNC: 0.2 E.U./DL
WBC # BLD AUTO: 10.6 K/UL (ref 4–11)
WBC #/AREA URNS HPF: ABNORMAL /HPF (ref 0–5)

## 2025-01-01 PROCEDURE — 87186 SC STD MICRODIL/AGAR DIL: CPT

## 2025-01-01 PROCEDURE — G0378 HOSPITAL OBSERVATION PER HR: HCPCS

## 2025-01-01 PROCEDURE — 6370000000 HC RX 637 (ALT 250 FOR IP): Performed by: NURSE PRACTITIONER

## 2025-01-01 PROCEDURE — 6370000000 HC RX 637 (ALT 250 FOR IP): Performed by: STUDENT IN AN ORGANIZED HEALTH CARE EDUCATION/TRAINING PROGRAM

## 2025-01-01 PROCEDURE — 2580000003 HC RX 258: Performed by: NURSE PRACTITIONER

## 2025-01-01 PROCEDURE — 6360000002 HC RX W HCPCS: Performed by: STUDENT IN AN ORGANIZED HEALTH CARE EDUCATION/TRAINING PROGRAM

## 2025-01-01 PROCEDURE — 81001 URINALYSIS AUTO W/SCOPE: CPT

## 2025-01-01 PROCEDURE — 2580000003 HC RX 258: Performed by: STUDENT IN AN ORGANIZED HEALTH CARE EDUCATION/TRAINING PROGRAM

## 2025-01-01 PROCEDURE — 85027 COMPLETE CBC AUTOMATED: CPT

## 2025-01-01 PROCEDURE — 96361 HYDRATE IV INFUSION ADD-ON: CPT

## 2025-01-01 PROCEDURE — 96365 THER/PROPH/DIAG IV INF INIT: CPT

## 2025-01-01 PROCEDURE — 36415 COLL VENOUS BLD VENIPUNCTURE: CPT

## 2025-01-01 PROCEDURE — 87088 URINE BACTERIA CULTURE: CPT

## 2025-01-01 PROCEDURE — 99222 1ST HOSP IP/OBS MODERATE 55: CPT | Performed by: STUDENT IN AN ORGANIZED HEALTH CARE EDUCATION/TRAINING PROGRAM

## 2025-01-01 PROCEDURE — 99232 SBSQ HOSP IP/OBS MODERATE 35: CPT | Performed by: NURSE PRACTITIONER

## 2025-01-01 PROCEDURE — 2500000003 HC RX 250 WO HCPCS

## 2025-01-01 PROCEDURE — 87086 URINE CULTURE/COLONY COUNT: CPT

## 2025-01-01 PROCEDURE — 96372 THER/PROPH/DIAG INJ SC/IM: CPT

## 2025-01-01 PROCEDURE — 6360000002 HC RX W HCPCS: Performed by: NURSE PRACTITIONER

## 2025-01-01 PROCEDURE — 80048 BASIC METABOLIC PNL TOTAL CA: CPT

## 2025-01-01 PROCEDURE — 51798 US URINE CAPACITY MEASURE: CPT

## 2025-01-01 PROCEDURE — 96375 TX/PRO/DX INJ NEW DRUG ADDON: CPT

## 2025-01-01 RX ORDER — ONDANSETRON 2 MG/ML
4 INJECTION INTRAMUSCULAR; INTRAVENOUS EVERY 6 HOURS PRN
Status: DISCONTINUED | OUTPATIENT
Start: 2025-01-01 | End: 2025-01-03

## 2025-01-01 RX ORDER — LABETALOL HYDROCHLORIDE 5 MG/ML
10 INJECTION, SOLUTION INTRAVENOUS EVERY 10 MIN PRN
Status: DISCONTINUED | OUTPATIENT
Start: 2025-01-01 | End: 2025-01-07 | Stop reason: HOSPADM

## 2025-01-01 RX ORDER — DEXTROSE MONOHYDRATE 100 MG/ML
INJECTION, SOLUTION INTRAVENOUS CONTINUOUS PRN
Status: DISCONTINUED | OUTPATIENT
Start: 2025-01-01 | End: 2025-01-07 | Stop reason: HOSPADM

## 2025-01-01 RX ORDER — LORAZEPAM 2 MG/ML
0.5 INJECTION INTRAMUSCULAR EVERY 6 HOURS PRN
Status: DISCONTINUED | OUTPATIENT
Start: 2025-01-01 | End: 2025-01-03

## 2025-01-01 RX ORDER — LATANOPROST 50 UG/ML
1 SOLUTION/ DROPS OPHTHALMIC NIGHTLY
COMMUNITY

## 2025-01-01 RX ORDER — INSULIN LISPRO 100 [IU]/ML
0-4 INJECTION, SOLUTION INTRAVENOUS; SUBCUTANEOUS
Status: DISCONTINUED | OUTPATIENT
Start: 2025-01-01 | End: 2025-01-05

## 2025-01-01 RX ORDER — PANTOPRAZOLE SODIUM 40 MG/1
40 TABLET, DELAYED RELEASE ORAL
Status: DISCONTINUED | OUTPATIENT
Start: 2025-01-01 | End: 2025-01-07 | Stop reason: HOSPADM

## 2025-01-01 RX ORDER — ASPIRIN 300 MG/1
300 SUPPOSITORY RECTAL DAILY
Status: DISCONTINUED | OUTPATIENT
Start: 2025-01-01 | End: 2025-01-01

## 2025-01-01 RX ORDER — HYDRALAZINE HYDROCHLORIDE 25 MG/1
25 TABLET, FILM COATED ORAL EVERY 8 HOURS SCHEDULED
Status: DISCONTINUED | OUTPATIENT
Start: 2025-01-01 | End: 2025-01-07 | Stop reason: HOSPADM

## 2025-01-01 RX ORDER — ALBUTEROL SULFATE 90 UG/1
2 INHALANT RESPIRATORY (INHALATION) 4 TIMES DAILY PRN
Status: DISCONTINUED | OUTPATIENT
Start: 2025-01-01 | End: 2025-01-07 | Stop reason: HOSPADM

## 2025-01-01 RX ORDER — CARVEDILOL 25 MG/1
25 TABLET ORAL 2 TIMES DAILY WITH MEALS
Status: DISCONTINUED | OUTPATIENT
Start: 2025-01-01 | End: 2025-01-07 | Stop reason: HOSPADM

## 2025-01-01 RX ORDER — TRAMADOL HYDROCHLORIDE 50 MG/1
50 TABLET ORAL EVERY 6 HOURS PRN
Status: ON HOLD | COMMUNITY
End: 2025-01-07

## 2025-01-01 RX ORDER — GUAIFENESIN 600 MG/1
600 TABLET, EXTENDED RELEASE ORAL 2 TIMES DAILY PRN
COMMUNITY

## 2025-01-01 RX ORDER — AMLODIPINE BESYLATE 5 MG/1
10 TABLET ORAL DAILY
Status: DISCONTINUED | OUTPATIENT
Start: 2025-01-01 | End: 2025-01-07 | Stop reason: HOSPADM

## 2025-01-01 RX ORDER — ACETAMINOPHEN 325 MG/1
650 TABLET ORAL EVERY 4 HOURS PRN
COMMUNITY

## 2025-01-01 RX ORDER — TROSPIUM CHLORIDE 20 MG/1
20 TABLET, FILM COATED ORAL NIGHTLY
Status: DISCONTINUED | OUTPATIENT
Start: 2025-01-01 | End: 2025-01-07 | Stop reason: HOSPADM

## 2025-01-01 RX ORDER — ROPINIROLE 0.25 MG/1
0.5 TABLET, FILM COATED ORAL NIGHTLY
Status: DISCONTINUED | OUTPATIENT
Start: 2025-01-01 | End: 2025-01-07 | Stop reason: HOSPADM

## 2025-01-01 RX ORDER — LORATADINE 10 MG/1
10 TABLET ORAL DAILY PRN
COMMUNITY

## 2025-01-01 RX ORDER — ASPIRIN 81 MG/1
81 TABLET, CHEWABLE ORAL DAILY
Status: DISCONTINUED | OUTPATIENT
Start: 2025-01-01 | End: 2025-01-02

## 2025-01-01 RX ORDER — FLUOXETINE 10 MG/1
10 CAPSULE ORAL EVERY 24 HOURS
Status: DISCONTINUED | OUTPATIENT
Start: 2025-01-02 | End: 2025-01-07 | Stop reason: HOSPADM

## 2025-01-01 RX ORDER — TRAMADOL HYDROCHLORIDE 50 MG/1
50 TABLET ORAL EVERY 6 HOURS PRN
Status: DISCONTINUED | OUTPATIENT
Start: 2025-01-01 | End: 2025-01-01

## 2025-01-01 RX ORDER — WATER 10 ML/10ML
INJECTION INTRAMUSCULAR; INTRAVENOUS; SUBCUTANEOUS
Status: COMPLETED
Start: 2025-01-01 | End: 2025-01-01

## 2025-01-01 RX ORDER — SODIUM CHLORIDE, SODIUM LACTATE, POTASSIUM CHLORIDE, CALCIUM CHLORIDE 600; 310; 30; 20 MG/100ML; MG/100ML; MG/100ML; MG/100ML
INJECTION, SOLUTION INTRAVENOUS CONTINUOUS
Status: ACTIVE | OUTPATIENT
Start: 2025-01-01 | End: 2025-01-01

## 2025-01-01 RX ORDER — OLANZAPINE 10 MG/2ML
2.5 INJECTION, POWDER, FOR SOLUTION INTRAMUSCULAR
Status: COMPLETED | OUTPATIENT
Start: 2025-01-01 | End: 2025-01-01

## 2025-01-01 RX ORDER — ATORVASTATIN CALCIUM 40 MG/1
80 TABLET, FILM COATED ORAL NIGHTLY
Status: DISCONTINUED | OUTPATIENT
Start: 2025-01-01 | End: 2025-01-07 | Stop reason: HOSPADM

## 2025-01-01 RX ORDER — BUSPIRONE HYDROCHLORIDE 10 MG/1
10 TABLET ORAL
Status: DISCONTINUED | OUTPATIENT
Start: 2025-01-02 | End: 2025-01-07 | Stop reason: HOSPADM

## 2025-01-01 RX ORDER — FLUOXETINE 10 MG/1
10 CAPSULE ORAL DAILY
COMMUNITY

## 2025-01-01 RX ORDER — MULTIVITAMIN WITH IRON
500 TABLET ORAL DAILY
COMMUNITY

## 2025-01-01 RX ORDER — GLUCAGON 1 MG/ML
1 KIT INJECTION PRN
Status: DISCONTINUED | OUTPATIENT
Start: 2025-01-01 | End: 2025-01-07 | Stop reason: HOSPADM

## 2025-01-01 RX ORDER — BUSPIRONE HYDROCHLORIDE 10 MG/1
10 TABLET ORAL 2 TIMES DAILY
Status: DISCONTINUED | OUTPATIENT
Start: 2025-01-01 | End: 2025-01-01

## 2025-01-01 RX ORDER — FUROSEMIDE 40 MG/1
40 TABLET ORAL EVERY EVENING
Status: DISCONTINUED | OUTPATIENT
Start: 2025-01-01 | End: 2025-01-07

## 2025-01-01 RX ADMIN — APIXABAN 5 MG: 5 TABLET, FILM COATED ORAL at 20:08

## 2025-01-01 RX ADMIN — TRAMADOL HYDROCHLORIDE 50 MG: 50 TABLET ORAL at 20:08

## 2025-01-01 RX ADMIN — ASPIRIN 81 MG: 81 TABLET, CHEWABLE ORAL at 08:56

## 2025-01-01 RX ADMIN — TROSPIUM CHLORIDE 20 MG: 20 TABLET, FILM COATED ORAL at 20:08

## 2025-01-01 RX ADMIN — BUSPIRONE HYDROCHLORIDE 10 MG: 10 TABLET ORAL at 08:56

## 2025-01-01 RX ADMIN — SODIUM CHLORIDE 1000 MG: 900 INJECTION INTRAVENOUS at 16:41

## 2025-01-01 RX ADMIN — SODIUM CHLORIDE, POTASSIUM CHLORIDE, SODIUM LACTATE AND CALCIUM CHLORIDE: 600; 310; 30; 20 INJECTION, SOLUTION INTRAVENOUS at 05:23

## 2025-01-01 RX ADMIN — ATORVASTATIN CALCIUM 80 MG: 40 TABLET, FILM COATED ORAL at 20:09

## 2025-01-01 RX ADMIN — INSULIN LISPRO 1 UNITS: 100 INJECTION, SOLUTION INTRAVENOUS; SUBCUTANEOUS at 20:12

## 2025-01-01 RX ADMIN — WATER 20 ML: 1 INJECTION INTRAMUSCULAR; INTRAVENOUS; SUBCUTANEOUS at 21:19

## 2025-01-01 RX ADMIN — OLANZAPINE 2.5 MG: 10 INJECTION, POWDER, FOR SOLUTION INTRAMUSCULAR at 21:19

## 2025-01-01 RX ADMIN — LORAZEPAM 0.5 MG: 2 INJECTION INTRAMUSCULAR; INTRAVENOUS at 22:45

## 2025-01-01 RX ADMIN — APIXABAN 5 MG: 5 TABLET, FILM COATED ORAL at 08:56

## 2025-01-01 RX ADMIN — ATORVASTATIN CALCIUM 80 MG: 40 TABLET, FILM COATED ORAL at 01:57

## 2025-01-01 RX ADMIN — ROPINIROLE HYDROCHLORIDE 0.5 MG: 0.25 TABLET, FILM COATED ORAL at 20:08

## 2025-01-01 ASSESSMENT — PAIN SCALES - PAIN ASSESSMENT IN ADVANCED DEMENTIA (PAINAD)
TOTALSCORE: 5
BREATHING: NORMAL
CONSOLABILITY: DISTRACTED OR REASSURED BY VOICE/TOUCH
NEGVOCALIZATION: REPEATED TROUBLED CALLING OUT, LOUD MOANING/GROANING, CRYING
FACIALEXPRESSION: SAD, FRIGHTENED, FROWN
BODYLANGUAGE: TENSE, DISTRESSED PACING, FIDGETING

## 2025-01-01 ASSESSMENT — PAIN SCALES - GENERAL
PAINLEVEL_OUTOF10: 5
PAINLEVEL_OUTOF10: 0
PAINLEVEL_OUTOF10: 0

## 2025-01-01 ASSESSMENT — PAIN DESCRIPTION - DESCRIPTORS: DESCRIPTORS: ACHING;DISCOMFORT

## 2025-01-01 ASSESSMENT — PAIN DESCRIPTION - LOCATION: LOCATION: BACK

## 2025-01-01 NOTE — ED NOTES
Pt daughter, mariaa, called and spoke with this RN.  States she can usually walk with a walker with phsycal therapy but generally uses wc and has to have someone push her around.  Pt always has deficit on left side since last stroke.  Pt daughter states she has dementia and can wake up screaming and pulling things off.  Pt also will try to get OOB on own.  RN provided update and advised Mariaa that we could call her @ [phone] with any updates.  RN questioned code status and patient daughter states \"she is a full code\" Sloane chance    Med req from [facility] sent to pharmacy.  Sloane chance

## 2025-01-01 NOTE — CONSULTS
Inpatient Neurology Consult  [facility]  Jaret Hernandez MD    Rosaura Viveros  1950    Date of Service: 1/1/2025    Referring Physician: Leon Walsh MD    Reason for the consult and CC: syncope and collapse    HPI:   Rosaura Viveros is a 74 y.o. female who  has a past medical history of Amenorrhea, Anxiety, Carpal tunnel syndrome of right wrist, CHF (congestive heart failure) ([facility]), Dementia with Lewy bodies (HCC), Depression, Epilepsy (HCC), Fibromyalgia, Gastritis, History of stroke, HTN (hypertension), Hypertension, Migraines, Narcolepsy without cataplexy(347.00), Overweight(278.02), Paroxysmal atrial fibrillation (HCC), Restless legs syndrome, TIA (transient ischemic attack), and Type II or unspecified type diabetes mellitus without mention of complication, not stated as uncontrolled. Admitted for syncope and collpase with new left sided weakness, THELMA on CKD, and UTI. She was not receiving any anti seizure medications for SNF med rec. S/p cardiac pacemaker on Eliquis at SNF.     Per daughter the spell is not well characterized. She \"passed out\" on the toilet. She says history of multiple strokes but I only see records of one previous stroke in 2018. Daughter does not know details of her medical history.     11/2018 acute ischemic stroke left subcortical lacunar infarct right face and weakness weakness and dysarthria. History of PAF previously on Eliquis at time of 2018 stroke. In 2018 she was taking Keppra for possible seizure activity and abnormal EEG consistent with focal epilepsy but switched to zonisamide due to irritability at bedtime.     I personally reviewed and updated social history, past medical history, medications, allergy, surgical history, and family history as documented in the patient's electronic health records.     ROS: 10-14 ROS reviewed with the patient/nurse/family which were unremarkable except mentioned in H&P.    Physical Examination  Mental Status:   Alert. Disoriented.  Inattentive. Language function relatively spared. No hemineglect.    Cranial Nerves:  II: Visual fields were normal to confrontation and visual acuity was good for face to face and TV distance.    III, IV, VI: Ocular motility was full and there was no nystagmus.    V: Facial sensation was normal. Jaw opening was symmetric.    VII: Facial strength symmetrically intact. +dysarthria.   VIII: Hearing acuity was normal.    IX, X: Palate and cough were normal.  XI: Shoulder shrug and head turning strength were normal.    XII: Tongue protrusion normal. No atrophy or fasciculations.     Motor: Normal muscle bulk and tone. Strength 5/5 symmetric distal and proximal bilateral upper and lower extremities. Left arm drift without pronation.     Sensation: normal to light touch in bilateral upper and lower extremities.     Deep tendon reflexes: 2/4 symmetric biceps. Unable to test patella due to pain and oppositional behaviors to exam maneuvers. Left striatal toe. Right toe down going.       Coordination: No tremor, myoclonus observed. Finger nose finger testing accurate. Jonny slow without decrement. Poor effort on the left side but improves with encouragement. Station and gait not tested due to safety concerns.     Data Reviewed:   I independently interpreted 12/31 NCHCT which revealed cerebral atrophy, encephalomalacia bilateral thalami and basal ganglia, white matter disease.   12/31 CTA head/neck 40% stenosis right ICA proximal, 20% stenosis left ICA proximal.   I reviewed the lab results. UA abnormal concerning for UTI.   11/2018 EEG: occasional left temporal sharp waves, left hemisphere slowing, mild to mod slowing    Assessment:   Syncope spell poorly characterized at SNF. Possible seizure. She has a history of seizure like behavior nos with previous abnormal EEG with temporal sharp waves. She was on zonisamide 200mg QHS but it is unclear why this was stopped. Keppra was previously not tolerated due to irritability. She  has a history of ripping off EEG leads per daughter limiting work up in the past. There are reports of left sided weakness but no evidence of this on my exam. Rule out stoke.   History of dementia presumed due to DLB. Per daughter it is critical that she receive Prozac and Buspar at 2pm.   History of stroke 2018 lacunar subcortical.   History of PAF.     Recommendations:    Start zonisamide 100mg QHS  Medicine to pursue cardiac work up for telemetry.   Avoid EEG testing given she is back to baseline and this is likely to exacerbate tendency for agitation.  Brain MRI wo  Permissive HTN x 24 hours then BP goal <140/90  Continue Eliquis 5mg BID  No neurological indication for aspirin. DC if there is no other indication for aspirin.   Continue home buspar and Prozac  Atorvastatin 80mg QHS  Discontinue tramadol due to risk of lowering seizure threshold  Consider using quetiapine low dose or benzodiazepine if agitation occurs  Neurology will continue to follow.    Electronically signed by Bonifacio Hernandez MD on 1/1/2025 at 5:20 PM

## 2025-01-01 NOTE — FLOWSHEET NOTE
01/01/25 0845   Vital Signs   Temp 98.1 °F (36.7 °C)   Temp Source Oral   Heart Rate Source Monitor   BP (!) 142/70   MAP (Calculated) 94   Pain Assessment   Pain Assessment None - Denies Pain   Pain Level 0   Opioid-Induced Sedation   POSS Score 1   Oxygen Therapy   SpO2 95 %   O2 Device None (Room air)     AM assessment completed, see flow sheet. Pt is alert and confused. Vital signs above. Respirations are even & easy. No complaints voiced. Pt denies needs at this time. SR up x 2, and bed in low position. Call light is within reach.

## 2025-01-01 NOTE — ED NOTES
Pt did well with drinking water and taking crushed atorvastatin with applesauce.  Sloane brown    Report given to Aquiles BROWN

## 2025-01-01 NOTE — ED NOTES
Perfect serve Dr. Mooney about the patient not taking tablet instead, it should be crush since the patient will receive protonix tablet. Called pharmacy  and they advised to inform Dr for order change.

## 2025-01-01 NOTE — ED PROVIDER NOTES
Emergency Department Encounter    Patient: Rosaura Viveros  MRN: [id number]  : 1950  Date of Evaluation: 2024  ED Provider:  Elliot Caballeor MD    Triage Chief Complaint:   Hypotension (Patient was brought by the squad from nursing Home with syncopal passed on toilet at 74/60.)    [place]:  Rosaura Viveros is a 74 y.o. female with history seen below presenting with complaints of passing out on the toilet.  Per report patient was found hypotensive at 74/60 although patient's blood pressure is 140/70 on presentation.  Patient was initially seen and evaluated by PA who noticed left-sided upper and lower extremity weakness.  Patient states this is new for her and she PA also discussed with nursing facility who states this is not her baseline and patient does not normally have a left sided weakness.  Patient is on Eliquis and is concerned for room with nursing facility the patient has been taking her Eliquis.  My evaluation patient complains of some discomfort over her left shoulder but otherwise denies any pain denies headache.  Denies chest pain or shortness of breath.  States she does have some discomfort along the left trapezius and left-sided neck but denies any central tenderness palpation along the cervical spine or paraspinous region.  Denies pain along the T or L-spine.  Denies abdominal pain, nausea vomiting, diarrhea constipation.  Denies any pain in her lower extremities.  Patient does have a history of dementia but is alert and oriented to person and place although does not know the year which appears to be baseline for patient.    ROS - see HPI, below listed is current ROS at time of my eval:  At least 14 systems reviewed, negative other than HPI    Past Medical History:   Diagnosis Date    Amenorrhea     Anxiety     Carpal tunnel syndrome of right wrist 2013    CHF (congestive heart failure) (HCC)     Dementia (HCC)     Depression     Fibromyalgia     Gastritis     HTN (hypertension)      visual field deficits, no facial palsy or asymmetry patient patient has some effort against gravity in the left upper extremity but does hit the bed patient has no pronator drift of the right upper extremity there is no pronator drift of the right lower patient's left lower extremity has effort against gravity but does hit the bed before 10 seconds with prompting patient does have normal finger-nose-finger of the right upper extremity as well as bilateral lower extremities but has difficulty raising her left arm enough to perform finger-nose-finger the left upper extremity.  Patient describes decreased sensation of the left lower extremity more than the left upper extremity with normal sensation in the right upper and lower extremities.  Patient does have mild language difficulty/aphasia, no dysarthria, no extinction          Psychiatric:  Appropriate    I have reviewed and interpreted all of the currently available lab results from this visit (if applicable):  Results for orders placed or performed during the hospital encounter of 12/31/24   CBC with Auto Differential   Result Value Ref Range    WBC 12.3 (H) 4.0 - 11.0 K/uL    RBC 3.60 (L) 4.00 - 5.20 M/uL    Hemoglobin 10.5 (L) 12.0 - 16.0 g/dL    Hematocrit 30.8 (L) 36.0 - 48.0 %    MCV 85.5 80.0 - 100.0 fL    MCH 29.2 26.0 - 34.0 pg    MCHC 34.1 31.0 - 36.0 g/dL    RDW 13.3 12.4 - 15.4 %    Platelets 288 135 - 450 K/uL    MPV 7.3 5.0 - 10.5 fL    Neutrophils % 79.0 %    Lymphocytes % 11.7 %    Monocytes % 8.0 %    Eosinophils % 0.7 %    Basophils % 0.6 %    Neutrophils Absolute 9.7 (H) 1.7 - 7.7 K/uL    Lymphocytes Absolute 1.4 1.0 - 5.1 K/uL    Monocytes Absolute 1.0 0.0 - 1.3 K/uL    Eosinophils Absolute 0.1 0.0 - 0.6 K/uL    Basophils Absolute 0.1 0.0 - 0.2 K/uL   EKG 12 Lead   Result Value Ref Range    Ventricular Rate 65 BPM    Atrial Rate 65 BPM    P-R Interval 166 ms    QRS Duration 86 ms    Q-T Interval 462 ms    QTc Calculation (Bazett) 480 ms    P Axis

## 2025-01-01 NOTE — PROGRESS NOTES
Progress Note    Admit Date:  12/31/2024    Reported syncopal episode while on commode   New onset left-sided weakness    Subjective:  Ms. Viveros is resting in bed.  Alert to self and place.  +left-sided weakness noted.     Objective:   Patient Vitals for the past 4 hrs:   BP Temp Temp src Pulse Resp   01/01/25 1638 (!) 164/62 97.8 °F (36.6 °C) Oral 68 18          Intake/Output Summary (Last 24 hours) at 1/1/2025 1511  Last data filed at 1/1/2025 1253  Gross per 24 hour   Intake 120 ml   Output 50 ml   Net 70 ml       Physical Exam:    Gen: No distress. Alert. Appears chronically ill, older than stated age  Eyes: PERRL. No sclera icterus. No conjunctival injection.   ENT: No discharge. Pharynx clear.   Neck: No JVD.  Trachea midline.  Resp: No accessory muscle use. No crackles. No wheezes. No rhonchi.   CV: Regular rate. Regular rhythm. No murmur.  No rub. No edema.   Capillary Refill: Brisk,< 3 seconds   GI: Non-tender. Non-distended.  Normal bowel sounds.  Skin: Warm and dry. No nodule on exposed extremities. No rash on exposed extremities.   M/S: No cyanosis. No joint deformity. No clubbing.   Neuro: Awake. +left-sided upper extremity weakness   Pt doesn't ambulate at baseline, wheelchair bound   Psych: Oriented x 1. + anxiety no agitation.      Scheduled Meds:   atorvastatin  80 mg Oral Nightly    aspirin  81 mg Oral Daily    insulin lispro  0-4 Units SubCUTAneous 4x Daily AC & HS    apixaban  5 mg Oral BID    [Held by provider] amLODIPine  10 mg Oral Daily    busPIRone  10 mg Oral BID    [Held by provider] carvedilol  25 mg Oral BID WC    [Held by provider] furosemide  40 mg Oral QPM    trospium  20 mg Oral Nightly    [Held by provider] hydrALAZINE  25 mg Oral 3 times per day    pantoprazole  40 mg Oral QAM AC    rOPINIRole  0.5 mg Oral Nightly       Continuous Infusions:   dextrose         PRN Meds:  ondansetron, labetalol, glucose, dextrose bolus **OR** dextrose bolus, glucagon (rDNA),  dextrose      Data:  CBC:   Recent Labs     12/31/24 2206 01/01/25  1240   WBC 12.3* 10.6   HGB 10.5* 10.0*   HCT 30.8* 28.8*   MCV 85.5 86.8    256     BMP:   Recent Labs     12/31/24 2206 01/01/25  1240    140   K 4.3 4.5    106   CO2 26 26   BUN 23* 21*   CREATININE 1.8* 1.8*     LIVER PROFILE:   Recent Labs     12/31/24 2206   AST 18   ALT 14   BILITOT 0.4   ALKPHOS 68     PT/INR:   Recent Labs     12/31/24 2247   PROTIME 16.1*   INR 1.28*       CULTURES    Results       Procedure Component Value Units Date/Time    Culture, Urine [[id number]] Collected: 01/01/25 0517    Order Status: No result Updated: 01/01/25 0551              RADIOLOGY    XR SHOULDER LEFT (MIN 2 VIEWS)   Final Result   No acute finding of the left shoulder.         XR CHEST PORTABLE   Final Result   Shallow inflation with findings consistent with subsegmental atelectasis.         CTA HEAD NECK W CONTRAST   Final Result   Addendum (preliminary) 1 of 1   ADDENDUM:   Findings were discussed with MAIKEL HAN at 10:43 pm on 12/31/2024.         Final   1. No acute intracranial abnormality. Encephalomalacia changes are seen in   the thalami and basal ganglia. Please correlate MR brain examination.   2. Global parenchymal volume loss with nonspecific white matter changes   likely related to chronic microvascular ischemic change.   3. No significant vascular abnormality in the head or neck.   4. 40% stenosis in the proximal right ICA by NASCET criteria.   5. 20% stenosis in the proximal left ICA due to calcific atherosclerosis.         CT HEAD WO CONTRAST   Final Result   Addendum (preliminary) 1 of 1   ADDENDUM:   Findings were discussed with MAIKEL HAN at 10:43 pm on 12/31/2024.         Final   1. No acute intracranial abnormality. Encephalomalacia changes are seen in   the thalami and basal ganglia. Please correlate MR brain examination.   2. Global parenchymal volume loss with nonspecific white matter changes   likely

## 2025-01-01 NOTE — H&P
History and Physical      Name:  Rosaura Viveros /Age/Sex: 1950  (74 y.o. female)   MRN & CSN:  [id number] & [id number] Encounter Date/Time:2024 11:59 PM EST   Location:   PCP: John Rose MD       Assessment and Plan:   Rosaura Viveros is a 74 y.o. female with chronic systolic heart failure, hypertension, hyperlipidemia, T2DM with long-term use of insulin, CKD stage III, Lewy body dementia, mood disorder, seizure disorder, atrial fibrillation presented from nursing home after a syncopal episode    Syncope and collapse  New onset left-sided weakness per report  May have had vasovagal syncope associated with transient ischemia  Unknown LKN, CT head and CTA head and neck reviewed no intracranial hemorrhage encephalomalacia changes in thalami and basal ganglia, less than 50% stenosis of bilateral ICA.  NIHSS and neurochecks per stroke protocol  Aspirin and Lipitor, MRI brain in a.m.  Permissive hypertension for at least 24 hours    THELMA on CKD stage IIIa, baseline creatinine 1.4  Creatinine 1.8 on presentation, bladder scan without evidence of retention  Check urinalysis, monitor renal function parameters    Chronic systolic heart failure  Clinically euvolemic, hold Lasix in the setting of hypotension and THELMA  Not suspecting cardiorenal syndrome    Hypertension  Hold home antihypertensives for at least 24 hours    T2DM with long-term use of insulin  Low-dose insulin sliding scale, hypoglycemia protocol, POC glucose ACHS    Lewy body dementia  Delirium precautions, avoid antipsychotic agents    Seizure disorder  Not on any antiepileptic medications according to nursing home med rec    Atrial fibrillation  Status post permanent pacemaker  Continue home Eliquis    Observation MedSurg telemetry  Full code according to daughter, DNR CC mentioned on order list from nursing home    Disposition:     Current Living situation: nursing home  Expected Disposition: Nursing home  Estimated D/C: 2  days    Diet Diet NPO Exceptions are: Sips of Water with Meds, Sips of Clear Liquids   DVT Prophylaxis [] Lovenox, []  Heparin, [] SCDs, [] Ambulation,  [x] Eliquis, [] Xarelto, [] Coumadin   Code Status Full Code   Surrogate Decision Maker/ JOSE ENRIQUE Leroy     Personally reviewed Lab Studies and Imaging   Discussed management of the case with ER provider who recommended admission due to syncope and concern for stroke  EKG interpreted personally and results normal sinus rhythm motion artifact 65/min no acute ST-T wave abnormalities    History from:     electronic medical record    History of Present Illness:     Chief Complaint   Patient presents with    Hypotension     Patient was brought by the squad from nursing Home with syncopal passed on toilet at 74/60.     Rosaura Viveros is a 74 y.o. female with chronic systolic heart failure, hypertension, hyperlipidemia, T2DM with long-term use of insulin, CKD stage III, Lewy body dementia, mood disorder, seizure disorder, atrial fibrillation presented from nursing home after a syncopal episode.  Apparently EMS reported patient's blood pressure was in 70s although on arrival in ER patient had a normal systolic blood pressure.  Initially evaluated by ER provider noted left-sided upper and lower extremity weakness, stroke alert was not initiated due to unknown LKN that patient being on Eliquis.  ER provider confirmed with nursing home about left-sided weakness and they confirmed that this is not baseline.  During my evaluation patient was alert oriented x 2 unable to engage in a meaningful conversation only mentions left shoulder pain on movement.    Review of Systems:      Unable to perform ROS due to mentation    Objective:   No intake or output data in the 24 hours ending 01/01/25 0514   Vitals:   Vitals:    12/31/24 2346 12/31/24 2349 01/01/25 0154 01/01/25 0400   BP:  (!) 126/59 134/68 (!) 147/68   Pulse:  62 74 79   Resp:  17 20 17   Temp:       TempSrc:       SpO2:  94%  contrast. Multiplanar reformatted images are provided for review.  MIP images are provided for review. Stenosis of the internal carotid arteries measured using NASCET criteria. Automated exposure control, iterative reconstruction, and/or weight based adjustment of the mA/kV was utilized to reduce the radiation dose to as low as reasonably achievable. COMPARISON: None. HISTORY: ORDERING SYSTEM PROVIDED HISTORY: head injury TECHNOLOGIST PROVIDED HISTORY: Reason for exam:->head injury Has a \"code stroke\" or \"stroke alert\" been called?->No Reason for Exam: head injury, code stroke; ORDERING SYSTEM PROVIDED HISTORY: left sided deficit TECHNOLOGIST PROVIDED HISTORY: Reason for exam:->left sided deficit Has a \"code stroke\" or \"stroke alert\" been called?->Yes Decision Support Exception - unselect if not a suspected or confirmed emergency medical condition->Emergency Medical Condition (MA) Reason for Exam: L sided deficit, code stroke FINDINGS: BRAIN/VENTRICLES: There is no acute intracranial hemorrhage, mass effect or midline shift. No abnormal extra-axial fluid collection.  The gray-white differentiation is maintained without evidence of an acute infarct. Encephalomalacia changes are seen in the thalami and basal ganglia.  There is prominence of the ventricles and sulci due to global parenchymal volume loss. There are nonspecific areas of hypoattenuation within the periventricular and subcortical white matter, which likely represent chronic microvascular ischemic change. ORBITS: The visualized portion of the orbits demonstrate no acute abnormality. SINUSES: The visualized paranasal sinuses and mastoid air cells demonstrate no acute abnormality. SOFT TISSUES/SKULL: No acute abnormality of the visualized skull or soft tissues. CTA NECK: AORTIC ARCH/ARCH VESSELS: No dissection or arterial injury.  No significant stenosis of the brachiocephalic or subclavian arteries. CAROTID ARTERIES: There is 40% stenosis in the proximal

## 2025-01-01 NOTE — PROGRESS NOTES
Pt admitted to unit from ED. She is alert and 2, accepting of care, pleasant in demeanor. IV intact to right forearm. NIH scale completed. Vitals stable with BP slightly elevated. Family at bedside. Swallow eval completed again, no issues noted. Diet order in place and pt offered food and water. Purewick in place as pt is wheelchair bound at baseline. Bed alarm in place. Pt oriented to room and unit. Provided pt with call light. No new concerns at this time.     HEART FAILURE CARE PLAN:    Comorbidities Reviewed: Yes   Patient has a past medical history of Amenorrhea, Anxiety, Carpal tunnel syndrome of right wrist, CHF (congestive heart failure) (HCC), Dementia (HCC), Depression, Fibromyalgia, Gastritis, HTN (hypertension), Hypertension, Migraines, Narcolepsy without cataplexy(347.00), Overweight(278.02), Restless legs syndrome, TIA (transient ischemic attack), and Type II or unspecified type diabetes mellitus without mention of complication, not stated as uncontrolled.     Weights Reviewed: Yes   Admission weight: 90.7 kg (200 lb)   Wt Readings from Last 3 Encounters:   12/31/24 90.7 kg (200 lb)   12/16/23 81.2 kg (179 lb)   09/23/23 81.2 kg (179 lb 0.2 oz)     Intake & Output Reviewed: Yes     Intake/Output Summary (Last 24 hours) at 1/1/2025 7707  Last data filed at 1/1/2025 0515  Gross per 24 hour   Intake --   Output 50 ml   Net -50 ml       ECHOCARDIOGRAM Reviewed: Yes   Patient's Ejection Fraction (EF) is greater than 40%     Medications Reviewed: Yes   SCHEDULED HOSPITAL MEDICATIONS:   atorvastatin  80 mg Oral Nightly    aspirin  81 mg Oral Daily    insulin lispro  0-4 Units SubCUTAneous 4x Daily AC & HS    apixaban  5 mg Oral BID    [Held by provider] amLODIPine  10 mg Oral Daily    busPIRone  10 mg Oral BID    [Held by provider] carvedilol  25 mg Oral BID WC    [Held by provider] furosemide  40 mg Oral QPM    trospium  20 mg Oral Nightly    [Held by provider] hydrALAZINE  25 mg Oral 3 times per day     pantoprazole  40 mg Oral QAM AC    rOPINIRole  0.5 mg Oral Nightly     HOME MEDICATIONS:  Prior to Admission medications    Medication Sig Start Date End Date Taking? Authorizing Provider   acetaminophen (TYLENOL) 325 MG tablet Take 2 tablets by mouth every 4 hours as needed for Pain    Yobany Lambert MD   Exenatide 2 MG PEN Inject 1 Pen into the skin once a week Every Wednesday.    Yobany Lambert MD   FLUoxetine (PROZAC) 10 MG capsule Take 1 capsule by mouth daily    Yobany Lambert MD   glucagon 1 MG injection Inject 1 mg into the muscle once as needed (For glucose < 70)    Yobany Lambert MD   latanoprost (XALATAN) 0.005 % ophthalmic solution Place 1 drop into both eyes nightly    Yobany Lambert MD   loratadine (CLARITIN) 10 MG tablet Take 1 tablet by mouth daily as needed (as needed for swelling)    Yobany Lambert MD   guaiFENesin (MUCINEX) 600 MG extended release tablet Take 1 tablet by mouth 2 times daily as needed for Congestion    Yobany Lambert MD   traMADol (ULTRAM) 50 MG tablet Take 1 tablet by mouth every 6 hours as needed for Pain. Max Daily Amount: 200 mg    Yobany Lambert MD   vitamin B-12 (CYANOCOBALAMIN) 500 MCG tablet Take 1 tablet by mouth daily    Yobany Lambert MD   albuterol sulfate HFA (VENTOLIN HFA) 108 (90 Base) MCG/ACT inhaler Inhale 2 puffs into the lungs 4 times daily as needed for Wheezing or Shortness of Breath  Patient taking differently: Inhale 2 puffs into the lungs every 6 hours as needed for Wheezing or Shortness of Breath 12/16/23   John Telles DO   busPIRone (BUSPAR) 10 MG tablet Take 1 tablet by mouth 2 times daily 9/23/23   Christine Tesfaye APRN - CNP   hydrALAZINE (APRESOLINE) 25 MG tablet Take 1 tablet by mouth every 8 hours 9/23/23   Christine Tesfaye APRN - CNP   carvedilol (COREG) 25 MG tablet Take 1 tablet by mouth 2 times daily (with meals) 9/23/23   Christine Tesfaye APRN

## 2025-01-01 NOTE — PLAN OF CARE
Problem: Chronic Conditions and Co-morbidities  Goal: Patient's chronic conditions and co-morbidity symptoms are monitored and maintained or improved  Outcome: Progressing  Flowsheets (Taken 1/1/2025 1506)  Care Plan - Patient's Chronic Conditions and Co-Morbidity Symptoms are Monitored and Maintained or Improved:   Monitor and assess patient's chronic conditions and comorbid symptoms for stability, deterioration, or improvement   Collaborate with multidisciplinary team to address chronic and comorbid conditions and prevent exacerbation or deterioration  Note: Neurology consulted

## 2025-01-01 NOTE — PROGRESS NOTES
4 Eyes Skin Assessment     NAME:  Rosaura Viveros  YOB: 1950  MEDICAL RECORD NUMBER:  [id number]    The patient is being assessed for  Admission    I agree that at least one RN has performed a thorough Head to Toe Skin Assessment on the patient. ALL assessment sites listed below have been assessed.      Areas assessed by both nurses:    Head, Face, Ears, Shoulders, Back, Chest, Arms, Elbows, Hands, Sacrum. Buttock, Coccyx, Ischium, Legs. Feet and Heels, and Under Medical Devices         Does the Patient have a Wound? No noted wound(s)       [place] Prevention initiated by RN: No  Wound Care Orders initiated by RN: No    Pressure Injury (Stage 3,4, Unstageable, DTI, NWPT, and Complex wounds) if present, place Wound referral order by RN under : No    New Ostomies, if present place, Ostomy referral order under : No     Nurse 1 eSignature: Electronically signed by Monika Fiore RN on 1/1/25 at 4:11 PM EST    **SHARE this note so that the co-signing nurse can place an eSignature**    Nurse 2 eSignature: {Esignature:[id number]}

## 2025-01-01 NOTE — ED NOTES
Informed janet about the information given to me by the squad. Patient had syncopal passed out at nursing home with BP of 74/60, blood glucose level at 180. Handed her the copy of the paper works of the patient from nursing home.

## 2025-01-01 NOTE — ED PROVIDER NOTES
[facility] ED  EMERGENCY DEPARTMENT ENCOUNTER        Pt Name: Rosaura Viveros  MRN: [id number]  Birthdate 1950  Date of evaluation: 12/31/2024  Provider: Vickie Whelan PA-C  PCP: John Rose MD  Note Started: 12:22 AM EST 1/1/25       I have seen and evaluated this patient with my supervising physician Dr. Caballero.      CHIEF COMPLAINT       Chief Complaint   Patient presents with    Hypotension     Patient was brought by the squad from nursing Home with syncopal passed on toilet at 74/60.       HISTORY OF PRESENT ILLNESS: 1 or more Elements     History From: Patient nursing home and EMS            Chief Complaint: Syncope    Rosaura Viveros is a 74 y.o. female who presents report from EMS was that the patient was reported to be hypotensive 70 over 40s and slumped over on the toilet at the nursing home today.  She arrives normotensive.  She is complaining of headache on the left side.  She is moaning and is a very poor historian.  She is oriented to person only.  When asking her questions she usually responds with \"I do not know \".  I spoke with Rosaura the nurse who takes care of of the patient at her nursing home.  She tells me the patient has not had any recent fever vomiting urinary symptoms new medications.  She has had diarrhea recently.  After dinner she was using the restroom with assistance and she became slumped over and not responding to questioning and her blood pressure was low.  She tells me that she thinks patient may have had a stroke in the past but states that she has never had any left-sided weakness before.  She does not walk on her own.  She is on Eliquis.  No fall injury.    Nursing Notes were all reviewed and agreed with or any disagreements were addressed in the HPI.    REVIEW OF SYSTEMS :      Review of Systems    Positives and Pertinent negatives as per HPI.     SURGICAL HISTORY     Past Surgical History:   Procedure Laterality Date    CHOLECYSTECTOMY  1979  Exam  Constitutional:       Comments: Elderly disheveled female pale appearing moaning confused disoriented   HENT:      Head: Normocephalic and atraumatic.      Right Ear: Tympanic membrane, ear canal and external ear normal.      Left Ear: Tympanic membrane, ear canal and external ear normal.      Mouth/Throat:      Mouth: Mucous membranes are moist.      Pharynx: Oropharynx is clear. No oropharyngeal exudate or posterior oropharyngeal erythema.      Comments: Uvula midline  Eyes:      General: No scleral icterus.     Extraocular Movements: Extraocular movements intact.      Conjunctiva/sclera: Conjunctivae normal.      Pupils: Pupils are equal, round, and reactive to light.   Cardiovascular:      Rate and Rhythm: Normal rate and regular rhythm.      Heart sounds: Normal heart sounds.   Pulmonary:      Effort: Pulmonary effort is normal. No respiratory distress.      Breath sounds: Normal breath sounds.   Abdominal:      General: Abdomen is flat. There is no distension.      Palpations: Abdomen is soft.      Tenderness: There is no abdominal tenderness. There is no right CVA tenderness, left CVA tenderness or guarding.   Musculoskeletal:         General: No tenderness or signs of injury.      Cervical back: Normal range of motion and neck supple. No tenderness.      Comments: PT DP pulses 2+ equal and bilateral   Skin:     General: Skin is warm and dry.   Neurological:      General: No focal deficit present.      Mental Status: She is alert. Mental status is at baseline. She is disoriented and confused.      GCS: GCS eye subscore is 4. GCS verbal subscore is 5. GCS motor subscore is 6.      Cranial Nerves: Cranial nerves 2-12 are intact. No dysarthria or facial asymmetry.      Comments: Visual field deficit is hard to assess due to patient compliance, left leg numbness, positive pronator drift on the left, unable to lift left lower extremity up off the bed due to weakness, unable to perform finger-to-nose or    BP: (!) 139/56   (!) 126/59   Pulse: 68 66  62   Resp: 21 19  17   Temp:       TempSrc:       SpO2: 97% 95%  94%   Weight:   90.7 kg (200 lb)    Height:   1.6 m (5' 3\")        Patient was given the following medications:  Medications   iopamidol (ISOVUE-370) 76 % injection 75 mL (75 mLs IntraVENous Given 12/31/24 2223)             [unfilled]      Chronic Conditions affecting care:    has a past medical history of Amenorrhea, Anxiety, Carpal tunnel syndrome of right wrist (07/16/2013), CHF (congestive heart failure) (HCC), Dementia (HCC), Depression, Fibromyalgia, Gastritis, HTN (hypertension), Hypertension, Migraines, Narcolepsy without cataplexy(347.00), Overweight(278.02), Restless legs syndrome, TIA (transient ischemic attack), and Type II or unspecified type diabetes mellitus without mention of complication, not stated as uncontrolled.    CONSULTS: (Who and What was discussed)  IP CONSULT TO CASE MANAGEMENT      Records Reviewed (External and Source) last CT head without contrast February 2023 reveals no acute intracranial abnormality, no brain MRI seen.  Patient was seen by cardiology for CVA in 2022 without any report of left-sided weakness or deficit, last ER visit December 2023 patient was found to have a normal neurologic examination    CC/HPI Summary, DDx, ED Course, and Reassessment: This is a 74-year-old female history of congestive heart failure dementia hypertension TIA diabetes coming in via EMS with what sounds like presyncope versus syncope while on the toilet tonight as she was hypotensive.  She arrives normotensive.  Patient is very confused and disoriented and her exam is very hard to obtain.  But patient does have obvious weakness to the left arm leg and numbness of the left leg.  She is a poor historian and does not know when the symptoms started but feels that they are new.  She states that she has a headache on the left side.  She does not know where she is or how she got here.  I

## 2025-01-02 ENCOUNTER — APPOINTMENT (OUTPATIENT)
Age: 75
DRG: 100 | End: 2025-01-02
Payer: MEDICARE

## 2025-01-02 LAB
ANION GAP SERPL CALCULATED.3IONS-SCNC: 10 MMOL/L (ref 3–16)
BUN SERPL-MCNC: 21 MG/DL (ref 7–20)
CALCIUM SERPL-MCNC: 8.5 MG/DL (ref 8.3–10.6)
CHLORIDE SERPL-SCNC: 109 MMOL/L (ref 99–110)
CHOLEST SERPL-MCNC: 144 MG/DL (ref 0–199)
CO2 SERPL-SCNC: 25 MMOL/L (ref 21–32)
CREAT SERPL-MCNC: 1.8 MG/DL (ref 0.6–1.2)
DEPRECATED RDW RBC AUTO: 12.9 % (ref 12.4–15.4)
ECHO AV CUSP MM: 1.6 CM
ECHO AV PEAK GRADIENT: 7 MMHG
ECHO AV PEAK VELOCITY: 1.4 M/S
ECHO BSA: 2.01 M2
ECHO LA AREA 2C: 19.8 CM2
ECHO LA AREA 4C: 16.9 CM2
ECHO LA DIAMETER INDEX: 1.76 CM/M2
ECHO LA DIAMETER: 3.4 CM
ECHO LA MAJOR AXIS: 4.8 CM
ECHO LA MINOR AXIS: 5.3 CM
ECHO LA VOL BP: 57 ML (ref 22–52)
ECHO LA VOL MOD A2C: 61 ML (ref 22–52)
ECHO LA VOL MOD A4C: 47 ML (ref 22–52)
ECHO LA VOL/BSA BIPLANE: 30 ML/M2 (ref 16–34)
ECHO LA VOLUME INDEX MOD A2C: 32 ML/M2 (ref 16–34)
ECHO LA VOLUME INDEX MOD A4C: 24 ML/M2 (ref 16–34)
ECHO LV E' LATERAL VELOCITY: 4.13 CM/S
ECHO LV E' SEPTAL VELOCITY: 4.35 CM/S
ECHO LV EF PHYSICIAN: 55 %
ECHO LV FRACTIONAL SHORTENING: 39 % (ref 28–44)
ECHO LV INTERNAL DIMENSION DIASTOLE INDEX: 2.54 CM/M2
ECHO LV INTERNAL DIMENSION DIASTOLIC: 4.9 CM (ref 3.9–5.3)
ECHO LV INTERNAL DIMENSION SYSTOLIC INDEX: 1.55 CM/M2
ECHO LV INTERNAL DIMENSION SYSTOLIC: 3 CM
ECHO LV ISOVOLUMETRIC RELAXATION TIME (IVRT): 77 MS
ECHO LV IVSD: 1.3 CM (ref 0.6–0.9)
ECHO LV MASS 2D: 253.7 G (ref 67–162)
ECHO LV MASS INDEX 2D: 131.5 G/M2 (ref 43–95)
ECHO LV POSTERIOR WALL DIASTOLIC: 1.3 CM (ref 0.6–0.9)
ECHO LV RELATIVE WALL THICKNESS RATIO: 0.53
ECHO MV A VELOCITY: 1.3 M/S
ECHO MV E VELOCITY: 0.93 M/S
ECHO MV E/A RATIO: 0.72
ECHO MV E/E' LATERAL: 22.52
ECHO MV E/E' RATIO (AVERAGED): 21.95
ECHO MV E/E' SEPTAL: 21.38
ECHO PV MAX VELOCITY: 0.7 M/S
ECHO PV PEAK GRADIENT: 2 MMHG
ECHO TV PEAK GRADIENT: 5 MMHG
EST. AVERAGE GLUCOSE BLD GHB EST-MCNC: 131.2 MG/DL
GFR SERPLBLD CREATININE-BSD FMLA CKD-EPI: 29 ML/MIN/{1.73_M2}
GLUCOSE BLD-MCNC: 130 MG/DL (ref 70–99)
GLUCOSE BLD-MCNC: 136 MG/DL (ref 70–99)
GLUCOSE BLD-MCNC: 85 MG/DL (ref 70–99)
GLUCOSE BLD-MCNC: 98 MG/DL (ref 70–99)
GLUCOSE SERPL-MCNC: 77 MG/DL (ref 70–99)
HBA1C MFR BLD: 6.2 %
HCT VFR BLD AUTO: 28 % (ref 36–48)
HDLC SERPL-MCNC: 62 MG/DL (ref 40–60)
HGB BLD-MCNC: 9.7 G/DL (ref 12–16)
LDLC SERPL CALC-MCNC: 66 MG/DL
MCH RBC QN AUTO: 29.6 PG (ref 26–34)
MCHC RBC AUTO-ENTMCNC: 34.5 G/DL (ref 31–36)
MCV RBC AUTO: 85.8 FL (ref 80–100)
PERFORMED ON: ABNORMAL
PERFORMED ON: ABNORMAL
PERFORMED ON: NORMAL
PERFORMED ON: NORMAL
PLATELET # BLD AUTO: 265 K/UL (ref 135–450)
PMV BLD AUTO: 7.5 FL (ref 5–10.5)
POTASSIUM SERPL-SCNC: 3.9 MMOL/L (ref 3.5–5.1)
RBC # BLD AUTO: 3.26 M/UL (ref 4–5.2)
SODIUM SERPL-SCNC: 144 MMOL/L (ref 136–145)
TRIGL SERPL-MCNC: 81 MG/DL (ref 0–150)
VLDLC SERPL CALC-MCNC: 16 MG/DL
WBC # BLD AUTO: 10 K/UL (ref 4–11)

## 2025-01-02 PROCEDURE — APPSS30 APP SPLIT SHARED TIME 16-30 MINUTES

## 2025-01-02 PROCEDURE — C9254 INJECTION, LACOSAMIDE: HCPCS

## 2025-01-02 PROCEDURE — 6370000000 HC RX 637 (ALT 250 FOR IP): Performed by: STUDENT IN AN ORGANIZED HEALTH CARE EDUCATION/TRAINING PROGRAM

## 2025-01-02 PROCEDURE — 6360000002 HC RX W HCPCS

## 2025-01-02 PROCEDURE — 80061 LIPID PANEL: CPT

## 2025-01-02 PROCEDURE — 6370000000 HC RX 637 (ALT 250 FOR IP): Performed by: INTERNAL MEDICINE

## 2025-01-02 PROCEDURE — 85027 COMPLETE CBC AUTOMATED: CPT

## 2025-01-02 PROCEDURE — 80048 BASIC METABOLIC PNL TOTAL CA: CPT

## 2025-01-02 PROCEDURE — 2580000003 HC RX 258: Performed by: NURSE PRACTITIONER

## 2025-01-02 PROCEDURE — 93306 TTE W/DOPPLER COMPLETE: CPT

## 2025-01-02 PROCEDURE — 99233 SBSQ HOSP IP/OBS HIGH 50: CPT | Performed by: INTERNAL MEDICINE

## 2025-01-02 PROCEDURE — 83036 HEMOGLOBIN GLYCOSYLATED A1C: CPT

## 2025-01-02 PROCEDURE — 93306 TTE W/DOPPLER COMPLETE: CPT | Performed by: STUDENT IN AN ORGANIZED HEALTH CARE EDUCATION/TRAINING PROGRAM

## 2025-01-02 PROCEDURE — 99233 SBSQ HOSP IP/OBS HIGH 50: CPT | Performed by: PSYCHIATRY & NEUROLOGY

## 2025-01-02 PROCEDURE — 6360000002 HC RX W HCPCS: Performed by: NURSE PRACTITIONER

## 2025-01-02 PROCEDURE — 1200000000 HC SEMI PRIVATE

## 2025-01-02 PROCEDURE — 36415 COLL VENOUS BLD VENIPUNCTURE: CPT

## 2025-01-02 RX ORDER — LACOSAMIDE 10 MG/ML
100 INJECTION, SOLUTION INTRAVENOUS 2 TIMES DAILY
Status: DISCONTINUED | OUTPATIENT
Start: 2025-01-02 | End: 2025-01-03

## 2025-01-02 RX ADMIN — BUSPIRONE HYDROCHLORIDE 10 MG: 10 TABLET ORAL at 10:42

## 2025-01-02 RX ADMIN — LACOSAMIDE 100 MG: 10 INJECTION INTRAVENOUS at 21:24

## 2025-01-02 RX ADMIN — ATORVASTATIN CALCIUM 80 MG: 40 TABLET, FILM COATED ORAL at 21:24

## 2025-01-02 RX ADMIN — APIXABAN 5 MG: 5 TABLET, FILM COATED ORAL at 21:24

## 2025-01-02 RX ADMIN — ASPIRIN 81 MG: 81 TABLET, CHEWABLE ORAL at 10:42

## 2025-01-02 RX ADMIN — APIXABAN 5 MG: 5 TABLET, FILM COATED ORAL at 10:42

## 2025-01-02 RX ADMIN — BUSPIRONE HYDROCHLORIDE 10 MG: 10 TABLET ORAL at 14:43

## 2025-01-02 RX ADMIN — ROPINIROLE HYDROCHLORIDE 0.5 MG: 0.25 TABLET, FILM COATED ORAL at 21:24

## 2025-01-02 RX ADMIN — SODIUM CHLORIDE 1000 MG: 900 INJECTION INTRAVENOUS at 14:44

## 2025-01-02 RX ADMIN — FLUOXETINE HYDROCHLORIDE 10 MG: 10 CAPSULE ORAL at 14:43

## 2025-01-02 RX ADMIN — CARVEDILOL 25 MG: 25 TABLET, FILM COATED ORAL at 17:10

## 2025-01-02 RX ADMIN — TROSPIUM CHLORIDE 20 MG: 20 TABLET, FILM COATED ORAL at 21:24

## 2025-01-02 RX ADMIN — HYDRALAZINE HYDROCHLORIDE 25 MG: 25 TABLET ORAL at 21:24

## 2025-01-02 ASSESSMENT — PAIN DESCRIPTION - ONSET: ONSET: ON-GOING

## 2025-01-02 ASSESSMENT — PAIN DESCRIPTION - PAIN TYPE: TYPE: ACUTE PAIN

## 2025-01-02 ASSESSMENT — PAIN DESCRIPTION - LOCATION: LOCATION: ARM

## 2025-01-02 ASSESSMENT — PAIN DESCRIPTION - DIRECTION: RADIATING_TOWARDS: DENIES

## 2025-01-02 ASSESSMENT — PAIN DESCRIPTION - DESCRIPTORS: DESCRIPTORS: ACHING

## 2025-01-02 ASSESSMENT — PAIN DESCRIPTION - ORIENTATION: ORIENTATION: LEFT

## 2025-01-02 ASSESSMENT — PAIN SCALES - GENERAL: PAINLEVEL_OUTOF10: 4

## 2025-01-02 ASSESSMENT — PAIN DESCRIPTION - FREQUENCY: FREQUENCY: CONTINUOUS

## 2025-01-02 ASSESSMENT — PAIN - FUNCTIONAL ASSESSMENT: PAIN_FUNCTIONAL_ASSESSMENT: ACTIVITIES ARE NOT PREVENTED

## 2025-01-02 NOTE — FLOWSHEET NOTE
01/01/25 2000   Vital Signs   Temp 98.4 °F (36.9 °C)   Temp Source Oral   Pulse 81   Heart Rate Source Monitor   Respirations 20   BP (!) 172/82   MAP (Calculated) 112   BP Location Right upper arm     Care assumed for 7pm-7am shift. Pt continues to scream at the top of her lungs despite reorienting many times. Pt appears to be sundowning. She believes that she is in her house and she also things that her twelve year old daughter is home alone. Pt will be reoriented agree and repeat what was said and as soon as you walk out of the room she is back to screaming as loud as she can. VS obtained and assessment performed. Pt given opportunity to ask questions, POC reviewed and all needs addressed at this time. Call light within reach, bed locked and low and TSM remains in place for pts safety.

## 2025-01-02 NOTE — FLOWSHEET NOTE
Orthostatics      01/02/25 1702   Vital Signs   BP Location Right upper arm   BP Method Automatic   Patient Position Supine   Orthostatic B/P and Pulse? Yes   Blood Pressure Lying 152/72   Pulse Lying 77 PER MINUTE   Blood Pressure Sitting 148/82   Pulse Sitting 86 PER MINUTE   Blood Pressure Standing 160/77   Pulse Standing 92 PER MINUTE

## 2025-01-02 NOTE — PROGRESS NOTES
Patient agitated and screaming, getting out of bed at this time, placed in [place] for safety.   Nika Hawley RN

## 2025-01-02 NOTE — PLAN OF CARE
Problem: Safety - Adult  Goal: Free from fall injury  Outcome: Progressing     Problem: Chronic Conditions and Co-morbidities  Goal: Patient's chronic conditions and co-morbidity symptoms are monitored and maintained or improved  Outcome: Progressing  Flowsheets (Taken 1/2/2025 1024)  Care Plan - Patient's Chronic Conditions and Co-Morbidity Symptoms are Monitored and Maintained or Improved: Monitor and assess patient's chronic conditions and comorbid symptoms for stability, deterioration, or improvement     Problem: Discharge Planning  Goal: Discharge to home or other facility with appropriate resources  Outcome: Progressing  Flowsheets (Taken 1/2/2025 1024)  Discharge to home or other facility with appropriate resources: Identify barriers to discharge with patient and caregiver     Problem: Pain  Goal: Verbalizes/displays adequate comfort level or baseline comfort level  Outcome: Progressing  Flowsheets  Taken 1/2/2025 1334  Verbalizes/displays adequate comfort level or baseline comfort level:   Assess pain using appropriate pain scale   Encourage patient to monitor pain and request assistance  Taken 1/2/2025 1024  Verbalizes/displays adequate comfort level or baseline comfort level:   Encourage patient to monitor pain and request assistance   Assess pain using appropriate pain scale

## 2025-01-02 NOTE — PROGRESS NOTES
RT Inhaler-Nebulizer Bronchodilator Protocol Note    There is a bronchodilator order in the chart from a provider indicating to follow the RT Bronchodilator Protocol and there is an “Initiate RT Inhaler-Nebulizer Bronchodilator Protocol” order as well (see protocol at bottom of note).    CXR Findings:  XR CHEST PORTABLE    Result Date: 12/31/2024  Shallow inflation with findings consistent with subsegmental atelectasis.       The findings from the last RT Protocol Assessment were as follows:   History Pulmonary Disease: None or smoker <15 pack years  Respiratory Pattern: Regular pattern and RR 12-20 bpm  Breath Sounds: Slightly diminished and/or crackles  Cough: Strong, spontaneous, non-productive  Indication for Bronchodilator Therapy: None  Bronchodilator Assessment Score: 2    Aerosolized bronchodilator medication orders have been revised according to the RT Inhaler-Nebulizer Bronchodilator Protocol below.    Respiratory Therapist to perform RT Therapy Protocol Assessment initially then follow the protocol.  Repeat RT Therapy Protocol Assessment PRN for score 0-3 or on second treatment, BID, and PRN for scores above 3.    No Indications - adjust the frequency to every 6 hours PRN wheezing or bronchospasm, if no treatments needed after 48 hours then discontinue using Per Protocol order mode.     If indication present, adjust the RT bronchodilator orders based on the Bronchodilator Assessment Score as indicated below.  Use Inhaler orders unless patient has one or more of the following: on home nebulizer, not able to hold breath for 10 seconds, is not alert and oriented, cannot activate and use MDI correctly, or respiratory rate 25 breaths per minute or more, then use the equivalent nebulizer order(s) with same Frequency and PRN reasons based on the score.  If a patient is on this medication at home then do not decrease Frequency below that used at home.    0-3 - enter or revise RT bronchodilator order(s) to

## 2025-01-02 NOTE — PROGRESS NOTES
IM Progress Note    Admit Date:  12/31/2024    Reported syncopal episode while on commode   New onset left-sided weakness    Nursing home patient from [place].  Partially oriented.  Has underlying Lewy body dementia.  DNR CC.   Presented with syncopal episode and left-sided weakness.   Seen by neurology.  Current workup suggesting seizure disorder, patient was supposed to be on Vimpat but was not getting it.   CT head was negative, however MRI of the brain ordered and could not be done as patient has a pacemaker.   Patient is on chronic anticoagulation    Also issues with sundowning, pt  was getting agitated last night and getting more confused -required restraints    Subjective:  Ms. Viveros seen .  She is currently calm, awake and partially oriented to person.   However she is already pulled out her IV.  She started having sundowning around this time yesterday we will continue to monitor.   Not in restraints    Objective:   Patient Vitals for the past 4 hrs:   BP Temp Temp src Pulse Resp SpO2 Height Weight   01/02/25 1446 (!) 183/72 -- -- -- -- -- 1.6 m (5' 3\") 90.7 kg (200 lb)   01/02/25 1334 (!) 187/97 98.2 °F (36.8 °C) Oral 72 16 97 % -- --          Intake/Output Summary (Last 24 hours) at 1/2/2025 1606  Last data filed at 1/2/2025 1334  Gross per 24 hour   Intake 120 ml   Output 600 ml   Net -480 ml       Physical Exam:    Gen: No distress. Alert. Appears chronically ill, older than stated age  Eyes: PERRL. No sclera icterus. No conjunctival injection.   ENT: No discharge. Pharynx clear.   Neck: No JVD.  Trachea midline.  Resp: No accessory muscle use. No crackles. No wheezes. No rhonchi.   CV: Regular rate. Regular rhythm. No murmur.  No rub. No edema.   Capillary Refill: Brisk,< 3 seconds   GI: Non-tender. Non-distended.  Normal bowel sounds.  Skin: Warm and dry. No nodule on exposed extremities. No rash on exposed extremities.   M/S: No cyanosis. No joint deformity. No clubbing.   Neuro: Awake.  confusion and agitation at night    Depression  - resume Prozac and Buspar     Chronic anemia  - stable   - monitor     Chronic pain  - resume Tramadol PRN     GERD  - Continue PPI         Note above makes patient higher risk for morbidity and mortality requiring testing and treatment.      DVT Prophylaxis: eliquis   Diet: ADULT DIET; Regular; 4 carb choices (60 gm/meal)  Code Status: DNR-CC    Patient daughter spoke to case management this morning and was requesting an update I did try to call her over the phone no answer.        Total time today 38 minutes. > 50 % time dominated by coordination of care      Leon Walsh MD   1/2/2025

## 2025-01-02 NOTE — PROGRESS NOTES
Physical and Occupational Therapy Attempt    Orders received and chart reviewed. Pt declining therapy at this time and would like to rest. Per , Pt able to return to LTC without skilled therapy notes. Will continue to follow. Re-attempt as appropriate and schedule permits.    Shweta Duarte, PT, DPT  Rebecca Tobias, OTR/L [id number]

## 2025-01-02 NOTE — PROGRESS NOTES
4 Eyes Skin Assessment     NAME:  Rosaura Viveros  YOB: 1950  MEDICAL RECORD NUMBER:  [id number]    The patient is being assessed for  Other Low stefan score     I agree that at least one RN has performed a thorough Head to Toe Skin Assessment on the patient. ALL assessment sites listed below have been assessed.      Areas assessed by both nurses:    Head, Face, Ears, Shoulders, Back, Chest, Arms, Elbows, Hands, Sacrum. Buttock, Coccyx, Ischium, Legs. Feet and Heels, and Under Medical Devices         Blanchable redness to buttocks   Scattered bruising     Does the Patient have a Wound? No noted wound(s)       Stefan Prevention initiated by RN: Yes  Wound Care Orders initiated by RN: No    Pressure Injury (Stage 3,4, Unstageable, DTI, NWPT, and Complex wounds) if present, place Wound referral order by RN under : No    New Ostomies, if present place, Ostomy referral order under : No     Nurse 1 eSignature: Electronically signed by Keren Ceballos RN on 1/2/25 at 2:02 PM EST    **SHARE this note so that the co-signing nurse can place an eSignature**    Nurse 2 eSignature: Electronically signed by Kylah Leavitt RN (Mandy) on 1/2/25 at 6:19 PM EST

## 2025-01-02 NOTE — PROGRESS NOTES
Bedside Mobility Assessment Tool (BMAT):     Assessment Level 1- Sit and Shake    1. From a semi-reclined position, ask patient to sit up and rotate to a seated position at the side of the bed. Can use the bedrail.    2. Ask patient to reach out and grab your hand and shake making sure patient reaches across his/her midline.   Pass- Patient is able to come to a seated position, maintain core strength. Maintains seated balance while reaching across midline. Move on to Assessment Level 2.     Assessment Level 2- Stretch and Point   1. With patient in seated position at the side of the bed, have patient place both feet on the floor (or stool) with knees no higher than hips.    2. Ask patient to stretch one leg and straighten the knee, then bend the ankle/flex and point the toes. If appropriate, repeat with the other leg.   Fail- Patient is unable to complete task. Patient is MOBILITY LEVEL 2.     Assessment Level 3- Stand   1. Ask patient to elevate off the bed or chair (seated to standing) using an assistive device (cane, bedrail).    2. Patient should be able to raise buttocks off be and hold for a count of five. May repeat once.   Fail- Patient unable to demonstrate standing stability. Patient is MOBILITY LEVEL 3.     Assessment Level 4- Walk   1. Ask patient to march in place at bedside.    2. Then ask patient to advance step and return each foot. Some medical conditions may render a patient from stepping backwards, use your best clinical judgement.   Fail- Patient not able to complete tasks OR requires use of assistive device. Patient is MOBILITY LEVEL 3.       Mobility Level- 2    Patient is able to demonstrate the ability to move from a reclining position to an upright position within the recliner.

## 2025-01-02 NOTE — PLAN OF CARE
Problem: Safety - Adult  Goal: Free from fall injury  Outcome: Progressing     Problem: Chronic Conditions and Co-morbidities  Goal: Patient's chronic conditions and co-morbidity symptoms are monitored and maintained or improved  1/1/2025 2101 by Luh Laureano RN  Outcome: Progressing     Problem: Discharge Planning  Goal: Discharge to home or other facility with appropriate resources  Outcome: Progressing     Problem: Pain  Goal: Verbalizes/displays adequate comfort level or baseline comfort level  Outcome: Progressing     Problem: ABCDS Injury Assessment  Goal: Absence of physical injury  Outcome: Progressing     Problem: Skin/Tissue Integrity  Goal: Absence of new skin breakdown  Description: 1.  Monitor for areas of redness and/or skin breakdown  2.  Assess vascular access sites hourly  3.  Every 4-6 hours minimum:  Change oxygen saturation probe site  4.  Every 4-6 hours:  If on nasal continuous positive airway pressure, respiratory therapy assess nares and determine need for appliance change or resting period.  Outcome: Progressing

## 2025-01-02 NOTE — PROGRESS NOTES
Pt has pacemaker. Per MRI, pt cannot have brain MRI completed here. Pt will need to transfer to Health system.

## 2025-01-02 NOTE — CARE COORDINATION
Case Management Assessment  Initial Evaluation    Date/Time of Evaluation: 1/2/2025 3:16 PM  Assessment Completed by: Ave Tracey RN    If patient is discharged prior to next notation, then this note serves as note for discharge by case management.    Patient Name: Rosaura Viveros                   YOB: 1950  Diagnosis: Left-sided weakness [R53.1]  Pre-syncope [R55]  Stroke-like symptoms [R29.90]  Syncope and collapse [R55]                   Date / Time: 12/31/2024  9:17 PM    Patient Admission Status: Inpatient   Readmission Risk (Low < 19, Mod (19-27), High > 27): No data recorded  Current PCP: John Rose MD  PCP verified by CM? Yes (Theresa)    Chart Reviewed: Yes      History Provided by: Child/Family  Patient Orientation: Alert and Oriented, Person    Patient Cognition: Dementia / Early Alzheimer's    Hospitalization in the last 30 days (Readmission):  No    If yes, Readmission Assessment in  Navigator will be completed.    Advance Directives:      Code Status: DNR-CC   Patient's Primary Decision Maker is: Legal Next of Kin    Primary Decision Maker: Mariaa Simms  Healthcare Decision Maker - [phone]    Discharge Planning:    Patient lives with: Other (Comment) (SNF) Type of Home: Long-Term Care  Primary Care Giver: Other (Comment) (SNF staff)  Patient Support Systems include: Children   Current Financial resources: Other (Comment) (Medicare and [employer])  Current community resources: None  Current services prior to admission: Extended Care Facility            Current DME:              Type of Home Care services:  None    ADLS  Prior functional level: Assistance with the following:, Bathing, Dressing, Toileting, Cooking, Housework, Shopping, Mobility  Current functional level: Assistance with the following:, Bathing, Dressing, Toileting, Cooking, Housework, Shopping, Mobility    PT AM-PAC:   /24  OT AM-PAC:   /24    Family can provide assistance at DC:

## 2025-01-02 NOTE — PROGRESS NOTES
Rosaura Viveros  Neurology Follow-up  [facility] Neurology    Date of Service: 1/2/2025    Subjective:   CC: Follow up today regarding: Syncope and Collapse     Events noted. Chart and lab reviewed.       ROS : A 10-12 system review obtained and updated today and is unremarkable except as mentioned  in my interval history.     Past medical history, social history, medication and family history reviewed.       Objective:  Exam:   Constitutional:   Vitals:    01/01/25 2000 01/02/25 0400 01/02/25 0558 01/02/25 1024   BP: (!) 172/82 (!) 183/73  (!) 189/74   Pulse: 81 (!) 106  78   Resp: 20 20  16   Temp: 98.4 °F (36.9 °C) 98.4 °F (36.9 °C)  98 °F (36.7 °C)   TempSrc: Oral Oral  Oral   SpO2: 97% 98%  97%   Weight:   90.9 kg (200 lb 8 oz)    Height:         General appearance:  Normal development and appear in no acute distress.   Mental Status:   Orientation to person only   Memory good immediate recall and intact remote memory    Attention intact as able to attend well to the exam     Language fluent in conversation   Comprehension intact; follows simple and two-steps commands    Cranial Nerves:   II: Visual fields: Full without extinction on confrontational testing.   III,IV,VI: Pupils: equal, round, reactive to light, bilaterally; Extra Ocular Movements are intact. No nystagmus  V: Facial sensation is intact to pin prick and light touch   VII: Facial strength and movements: intact and symmetric  VIII: Hearing: Intact to finger rub bilaterally   IX, XI: Normal palatal elevation and shoulder shrug  XII: Tongue movements are normal; tongue midline with protrusion   Musculoskeletal: 5/5 in all 4 extremities.  Good range of motion.  No muscle atrophy.    Tone: Normal tone.   Reflexes: Bilateral biceps 2/4, patellar 2/4  Planters: Flexor bilaterally  Coordination: no pronator drift, no dysmetria with FNF   Sensation: normal in both arms and legs.      MDM:  A. Problems (any 1)    High:    [x] Acute/Chronic Illness/injury  Results   Component Value Date    INR 1.28 (H) 12/31/2024    PROTIME 16.1 (H) 12/31/2024       Neuroimaging was independently reviewed by me and discussed results with the patient  I reviewed blood testing and other test results and discussed results with the patient      Impression:  Syncopal episode while at SNF   Patient has a history of seizure like activity with previous abnormal EEG with temporal sharp waves. Patient previously on Keppra but was not tolerated due to increased irritability. Patient was on zonisamide 200 mg nightly but unclear why discontinued.     Nurse reports increased agitation overnight requiring ativan and Zyprexa.     Recommendation  Avoid EEG testing given she is back to baseline and this is likely to exacerbate tendency for agitation.   Unable to obtain MRI Brain without secondary to pacemaker   Blood pressure goal <140/90  Continue Eliquis 5 mg BID   Continue home buspar and Prozac  Continue Vimpat 100 mg BID IV ordered   Continue atorvastatin 80 mg nightly   Neurology to continue following        OKSANA Khan NP    Addendum:    Patient is also examined and evaluated by me.  I concur with the plan above.

## 2025-01-02 NOTE — PROGRESS NOTES
Shift assessment complete. BP remains elevated. Scheduled meds given. See MAR. Pt oriented to self, disoriented to place, time or situation. Pt c/o LUE pain. [place] belt in place d/t increased agitation, restlessness overnight. Pt clean and dry, pure wick in place. Pt denies further needs. Call light within reach. Bed alarm on. Tele sitter in place.

## 2025-01-03 LAB
ANION GAP SERPL CALCULATED.3IONS-SCNC: 10 MMOL/L (ref 3–16)
BACTERIA UR CULT: ABNORMAL
BUN SERPL-MCNC: 24 MG/DL (ref 7–20)
CALCIUM SERPL-MCNC: 7.9 MG/DL (ref 8.3–10.6)
CHLORIDE SERPL-SCNC: 108 MMOL/L (ref 99–110)
CO2 SERPL-SCNC: 25 MMOL/L (ref 21–32)
CREAT SERPL-MCNC: 2.3 MG/DL (ref 0.6–1.2)
DEPRECATED RDW RBC AUTO: 13 % (ref 12.4–15.4)
GFR SERPLBLD CREATININE-BSD FMLA CKD-EPI: 22 ML/MIN/{1.73_M2}
GLUCOSE BLD-MCNC: 107 MG/DL (ref 70–99)
GLUCOSE BLD-MCNC: 135 MG/DL (ref 70–99)
GLUCOSE BLD-MCNC: 151 MG/DL (ref 70–99)
GLUCOSE BLD-MCNC: 176 MG/DL (ref 70–99)
GLUCOSE BLD-MCNC: 201 MG/DL (ref 70–99)
GLUCOSE SERPL-MCNC: 96 MG/DL (ref 70–99)
HCT VFR BLD AUTO: 26.5 % (ref 36–48)
HGB BLD-MCNC: 9.1 G/DL (ref 12–16)
MCH RBC QN AUTO: 29.5 PG (ref 26–34)
MCHC RBC AUTO-ENTMCNC: 34.3 G/DL (ref 31–36)
MCV RBC AUTO: 85.8 FL (ref 80–100)
ORGANISM: ABNORMAL
PERFORMED ON: ABNORMAL
PLATELET # BLD AUTO: 239 K/UL (ref 135–450)
PMV BLD AUTO: 8.1 FL (ref 5–10.5)
POTASSIUM SERPL-SCNC: 4.1 MMOL/L (ref 3.5–5.1)
RBC # BLD AUTO: 3.09 M/UL (ref 4–5.2)
SODIUM SERPL-SCNC: 143 MMOL/L (ref 136–145)
WBC # BLD AUTO: 7.1 K/UL (ref 4–11)

## 2025-01-03 PROCEDURE — 6370000000 HC RX 637 (ALT 250 FOR IP): Performed by: STUDENT IN AN ORGANIZED HEALTH CARE EDUCATION/TRAINING PROGRAM

## 2025-01-03 PROCEDURE — 36415 COLL VENOUS BLD VENIPUNCTURE: CPT

## 2025-01-03 PROCEDURE — 97530 THERAPEUTIC ACTIVITIES: CPT

## 2025-01-03 PROCEDURE — 80048 BASIC METABOLIC PNL TOTAL CA: CPT

## 2025-01-03 PROCEDURE — 6360000002 HC RX W HCPCS: Performed by: NURSE PRACTITIONER

## 2025-01-03 PROCEDURE — 1200000000 HC SEMI PRIVATE

## 2025-01-03 PROCEDURE — 97166 OT EVAL MOD COMPLEX 45 MIN: CPT

## 2025-01-03 PROCEDURE — 99232 SBSQ HOSP IP/OBS MODERATE 35: CPT | Performed by: INTERNAL MEDICINE

## 2025-01-03 PROCEDURE — 6370000000 HC RX 637 (ALT 250 FOR IP): Performed by: PSYCHIATRY & NEUROLOGY

## 2025-01-03 PROCEDURE — 99233 SBSQ HOSP IP/OBS HIGH 50: CPT | Performed by: PSYCHIATRY & NEUROLOGY

## 2025-01-03 PROCEDURE — 97535 SELF CARE MNGMENT TRAINING: CPT

## 2025-01-03 PROCEDURE — 6360000002 HC RX W HCPCS

## 2025-01-03 PROCEDURE — 97161 PT EVAL LOW COMPLEX 20 MIN: CPT

## 2025-01-03 PROCEDURE — 6370000000 HC RX 637 (ALT 250 FOR IP): Performed by: INTERNAL MEDICINE

## 2025-01-03 PROCEDURE — C9254 INJECTION, LACOSAMIDE: HCPCS

## 2025-01-03 PROCEDURE — 2580000003 HC RX 258: Performed by: NURSE PRACTITIONER

## 2025-01-03 PROCEDURE — 85027 COMPLETE CBC AUTOMATED: CPT

## 2025-01-03 PROCEDURE — 2580000003 HC RX 258: Performed by: INTERNAL MEDICINE

## 2025-01-03 RX ORDER — MECOBALAMIN 5000 MCG
5 TABLET,DISINTEGRATING ORAL
Status: DISCONTINUED | OUTPATIENT
Start: 2025-01-03 | End: 2025-01-07 | Stop reason: HOSPADM

## 2025-01-03 RX ORDER — ONDANSETRON 4 MG/1
4 TABLET, ORALLY DISINTEGRATING ORAL EVERY 6 HOURS PRN
Status: DISCONTINUED | OUTPATIENT
Start: 2025-01-03 | End: 2025-01-07 | Stop reason: HOSPADM

## 2025-01-03 RX ORDER — LORAZEPAM 2 MG/ML
0.5 INJECTION INTRAMUSCULAR EVERY 6 HOURS PRN
Status: DISCONTINUED | OUTPATIENT
Start: 2025-01-03 | End: 2025-01-07 | Stop reason: HOSPADM

## 2025-01-03 RX ORDER — WATER 10 ML/10ML
INJECTION INTRAMUSCULAR; INTRAVENOUS; SUBCUTANEOUS
Status: COMPLETED
Start: 2025-01-03 | End: 2025-01-04

## 2025-01-03 RX ORDER — ONDANSETRON 4 MG/1
4 TABLET, FILM COATED ORAL EVERY 6 HOURS PRN
Status: DISCONTINUED | OUTPATIENT
Start: 2025-01-03 | End: 2025-01-07 | Stop reason: HOSPADM

## 2025-01-03 RX ORDER — QUETIAPINE FUMARATE 25 MG/1
25 TABLET, FILM COATED ORAL NIGHTLY PRN
Status: DISCONTINUED | OUTPATIENT
Start: 2025-01-03 | End: 2025-01-04

## 2025-01-03 RX ORDER — OLANZAPINE 10 MG/2ML
5 INJECTION, POWDER, FOR SOLUTION INTRAMUSCULAR
Status: COMPLETED | OUTPATIENT
Start: 2025-01-03 | End: 2025-01-04

## 2025-01-03 RX ORDER — SODIUM CHLORIDE 9 MG/ML
INJECTION, SOLUTION INTRAVENOUS CONTINUOUS
Status: DISCONTINUED | OUTPATIENT
Start: 2025-01-03 | End: 2025-01-03

## 2025-01-03 RX ORDER — ONDANSETRON 2 MG/ML
4 INJECTION INTRAMUSCULAR; INTRAVENOUS EVERY 6 HOURS PRN
Status: DISCONTINUED | OUTPATIENT
Start: 2025-01-03 | End: 2025-01-07 | Stop reason: HOSPADM

## 2025-01-03 RX ORDER — LACOSAMIDE 50 MG/1
100 TABLET ORAL 2 TIMES DAILY
Status: DISCONTINUED | OUTPATIENT
Start: 2025-01-03 | End: 2025-01-07 | Stop reason: HOSPADM

## 2025-01-03 RX ADMIN — INSULIN LISPRO 1 UNITS: 100 INJECTION, SOLUTION INTRAVENOUS; SUBCUTANEOUS at 21:44

## 2025-01-03 RX ADMIN — BUSPIRONE HYDROCHLORIDE 10 MG: 10 TABLET ORAL at 14:57

## 2025-01-03 RX ADMIN — HYDRALAZINE HYDROCHLORIDE 25 MG: 25 TABLET ORAL at 20:12

## 2025-01-03 RX ADMIN — FLUOXETINE HYDROCHLORIDE 10 MG: 10 CAPSULE ORAL at 14:57

## 2025-01-03 RX ADMIN — ROPINIROLE HYDROCHLORIDE 0.5 MG: 0.25 TABLET, FILM COATED ORAL at 20:12

## 2025-01-03 RX ADMIN — CARVEDILOL 25 MG: 25 TABLET, FILM COATED ORAL at 09:45

## 2025-01-03 RX ADMIN — SODIUM CHLORIDE 1000 MG: 900 INJECTION INTRAVENOUS at 14:57

## 2025-01-03 RX ADMIN — ONDANSETRON 4 MG: 4 TABLET, ORALLY DISINTEGRATING ORAL at 20:04

## 2025-01-03 RX ADMIN — LACOSAMIDE 100 MG: 10 INJECTION INTRAVENOUS at 09:43

## 2025-01-03 RX ADMIN — SODIUM CHLORIDE: 9 INJECTION, SOLUTION INTRAVENOUS at 11:38

## 2025-01-03 RX ADMIN — ATORVASTATIN CALCIUM 80 MG: 40 TABLET, FILM COATED ORAL at 20:11

## 2025-01-03 RX ADMIN — HYDRALAZINE HYDROCHLORIDE 25 MG: 25 TABLET ORAL at 14:57

## 2025-01-03 RX ADMIN — TROSPIUM CHLORIDE 20 MG: 20 TABLET, FILM COATED ORAL at 20:12

## 2025-01-03 RX ADMIN — APIXABAN 5 MG: 5 TABLET, FILM COATED ORAL at 20:12

## 2025-01-03 RX ADMIN — PANTOPRAZOLE SODIUM 40 MG: 40 TABLET, DELAYED RELEASE ORAL at 06:12

## 2025-01-03 RX ADMIN — APIXABAN 5 MG: 5 TABLET, FILM COATED ORAL at 09:44

## 2025-01-03 RX ADMIN — LACOSAMIDE 100 MG: 50 TABLET, FILM COATED ORAL at 20:11

## 2025-01-03 RX ADMIN — BUSPIRONE HYDROCHLORIDE 10 MG: 10 TABLET ORAL at 09:44

## 2025-01-03 RX ADMIN — AMLODIPINE BESYLATE 10 MG: 5 TABLET ORAL at 09:45

## 2025-01-03 RX ADMIN — HYDRALAZINE HYDROCHLORIDE 25 MG: 25 TABLET ORAL at 06:12

## 2025-01-03 RX ADMIN — QUETIAPINE FUMARATE 25 MG: 25 TABLET ORAL at 20:12

## 2025-01-03 RX ADMIN — Medication 5 MG: at 20:11

## 2025-01-03 ASSESSMENT — PAIN DESCRIPTION - ORIENTATION: ORIENTATION: RIGHT;LEFT;LOWER

## 2025-01-03 ASSESSMENT — PAIN SCALES - GENERAL
PAINLEVEL_OUTOF10: 0
PAINLEVEL_OUTOF10: 0
PAINLEVEL_OUTOF10: 8

## 2025-01-03 ASSESSMENT — PAIN DESCRIPTION - LOCATION: LOCATION: BACK;LEG

## 2025-01-03 ASSESSMENT — PAIN - FUNCTIONAL ASSESSMENT: PAIN_FUNCTIONAL_ASSESSMENT: ACTIVITIES ARE NOT PREVENTED

## 2025-01-03 ASSESSMENT — PAIN DESCRIPTION - DESCRIPTORS: DESCRIPTORS: ACHING;DISCOMFORT

## 2025-01-03 NOTE — PROGRESS NOTES
Progress Note    Admit Date:  12/31/2024    Reported syncopal episode while on commode   New onset left-sided weakness    Nursing home patient from [place].  Partially oriented.  Has underlying Lewy body dementia.  DNR CC.   Presented with syncopal episode and left-sided weakness.   Seen by neurology.  Current workup suggesting seizure disorder, patient was supposed to be on Vimpat but was not getting it.   CT head was negative, however MRI of the brain ordered and could not be done as patient has a pacemaker.   Patient is on chronic anticoagulation    Also issues with sundowning, pt  was getting agitated last night and getting more confused -required restraints    Subjective:  Ms. Viveros seen .  Confused again this evening.  awake and partially oriented to person.   IV fluids started this morning, patient pulled out her IV again this evening .  She has having recurrent issues with sundowning and increasing confusion in the evenings and nights .  Currently not in restraints.        Objective:   Patient Vitals for the past 4 hrs:   BP Pulse   01/03/25 1454 (!) 127/55 68     VSS -reviewed     Intake/Output Summary (Last 24 hours) at 1/3/2025 1746  Last data filed at 1/3/2025 1547  Gross per 24 hour   Intake 240 ml   Output 750 ml   Net -510 ml       Physical Exam:    Gen: No distress. Alert. Appears chronically ill, older than stated age  Eyes: PERRL. No sclera icterus. No conjunctival injection.   ENT: No discharge. Pharynx clear.   Neck: No JVD.  Trachea midline.  Resp: No accessory muscle use. No crackles. No wheezes. No rhonchi.   CV: Regular rate. Regular rhythm. No murmur.  No rub. No edema.   Capillary Refill: Brisk,< 3 seconds   GI: Non-tender. Non-distended.  Normal bowel sounds.  Skin: Warm and dry. No nodule on exposed extremities. No rash on exposed extremities.   M/S: No cyanosis. No joint deformity. No clubbing.   Neuro: Awake. +left-sided upper extremity weakness   Pt doesn't ambulate at    2. Global parenchymal volume loss with nonspecific white matter changes   likely related to chronic microvascular ischemic change.   3. No significant vascular abnormality in the head or neck.   4. 40% stenosis in the proximal right ICA by NASCET criteria.   5. 20% stenosis in the proximal left ICA due to calcific atherosclerosis.              Assessment/Plan:    Syncope and Collapse  New onset left-sided weakness  Hx of Seizure, concern for seizure   - CT head and CTA of head and neck showed no intracranial hemorrhage. Does show some encephalomalacia changes in the thalami and basal ganglia. MRI ordered. Likely  microvascular ischemic changes. 40% of the right ICA and 20% stenosis of the left ICA  - neurology consulted  - not on any seizure regimen prior to admit   - MRI of brain ordered, cannot have done here due to pacemaker.  - echo ordered, still pending.  - PT/OT  - check orthostatics  - seizure precautions ordered  - monitor on telemetry   - x-ray of left shoulder showed no acute finding   - eliquis and statin   - per neuro, no need for ASA, will discontinue.   -started on Vimpat per neurology.    -> Syncopal episode likely secondary to seizure patient is on Vimpat continue the same.  Cannot rule out acute CVA,  MRI  of the brain could not be done-continue anticoagulation with Eliquis continue statins.      Elevated Creatinine on CKD stage   - baseline creatinine appears to be 1.4  - creatinine on admission 1.8, remains 1.8.  -Given IVF  - Creatinine remains at 1.8-hold off on further IV fluids monitor BMP.  Hold diuretics-Lasix; hold irbesartan  - bladder scan completed, no evidence of retention   - check urinalysis- +UTI . See below   - strict I/O    Creatinine trended up to 2.3 today she was again hydrated during the day.  She has pulled out her IV now . Hold further IV fluids and recheck BMP in a.m.    E. Coli UTI.  Acute Cystitis with hematuria   - urinalysis as above  - urine culture growing E.

## 2025-01-03 NOTE — PROGRESS NOTES
Inpatient Occupational Therapy Evaluation & Treatment    Unit: [facility]  Date:  1/3/2025  Patient Name:    Rosaura Viveros  Admitting diagnosis:  Left-sided weakness [R53.1]  Pre-syncope [R55]  Stroke-like symptoms [R29.90]  Syncope and collapse [R55]  Admit Date:  12/31/2024  Precautions/Restrictions/WB Status/ Lines/ Wounds/ Oxygen: Fall risk, Bed/chair alarm, Lines (IV and external catheter), Confusion, Telemetry, and Telesitter    Pt seen for cotreatment this date due to patient safety, patient endurance, complexity of condition, and acute illness/injury    Treatment Time: 10:59-11:32  Treatment Number:  1  Timed Code Treatment Minutes: 23 minutes  Total Treatment Minutes: 33 minutes    Patient Goals for Therapy: none stated         Discharge Recommendations: LTC with skilled therapy  DME needs for discharge: Defer to facility       Therapy recommendations for staff:   Assist of 2 for transfers with use of rolling walker (RW) and gait belt to/from BSC  to/from chair    History of Present Illness: Per admission H&P from Renetta Mooney MD on 12/31  \"Rosaura Viveros is a 74 y.o. female with chronic systolic heart failure, hypertension, hyperlipidemia, T2DM with long-term use of insulin, CKD stage III, Lewy body dementia, mood disorder, seizure disorder, atrial fibrillation presented from nursing home after a syncopal episode     Syncope and collapse  New onset left-sided weakness per report  May have had vasovagal syncope associated with transient ischemia  Unknown LKN, CT head and CTA head and neck reviewed no intracranial hemorrhage encephalomalacia changes in thalami and basal ganglia, less than 50% stenosis of bilateral ICA.  NIHSS and neurochecks per stroke protocol  Aspirin and Lipitor, MRI brain in a.m.  Permissive hypertension for at least 24 hours     THELMA on CKD stage IIIa, baseline creatinine 1.4  Creatinine 1.8 on presentation, bladder scan without evidence of retention  Check urinalysis, monitor renal

## 2025-01-03 NOTE — PROGRESS NOTES
Shift assessment complete. VSS. Patient A/O to self, disoriented to place, time, situation. Pt denies pain or N/V/D. Scheduled meds given. See MAR. Pt denies further needs. Call light within reach. Bed alarm on. Tele sitter in place.

## 2025-01-03 NOTE — PROGRESS NOTES
Rosaura Viveros  Neurology Follow-up  [facility]    Date of Service: 1/3/2025    Subjective:   CC: Follow up today regarding: Syncope and Collapse     Events noted. Chart and lab reviewed.       ROS : A 10-12 system review obtained and updated today and is unremarkable except as mentioned  in my interval history.     Past medical history, social history, medication and family history reviewed.       Objective:  Exam:   Constitutional:   Vitals:    01/03/25 0345 01/03/25 0615 01/03/25 0630 01/03/25 0942   BP: (!) 157/87   134/64   Pulse: 80   79   Resp: 16   16   Temp: 97.9 °F (36.6 °C)   98.4 °F (36.9 °C)   TempSrc: Oral   Oral   SpO2: 98%   92%   Weight:  72.4 kg (159 lb 9.6 oz) 73.7 kg (162 lb 6.4 oz)    Height:         General appearance:  Normal development and appear in no acute distress.   Mental Status:   Orientation to person only   Memory good immediate recall and intact remote memory    Attention intact as able to attend well to the exam     Language fluent in conversation   Comprehension intact; follows simple and two-steps commands    Cranial Nerves:   II: Visual fields: Full without extinction on confrontational testing.   III,IV,VI: Pupils: equal, round, reactive to light, bilaterally; Extra Ocular Movements are intact. No nystagmus  V: Facial sensation is intact to pin prick and light touch   VII: Facial strength and movements: intact and symmetric  VIII: Hearing: Intact to finger rub bilaterally   IX, XI: Normal palatal elevation and shoulder shrug  XII: Tongue movements are normal; tongue midline with protrusion   Musculoskeletal: 5/5 in all 4 extremities.  Good range of motion.  No muscle atrophy.    Tone: Normal tone.   Reflexes: Bilateral biceps 2/4, patellar 2/4  Planters: Flexor bilaterally  Coordination: no pronator drift, no dysmetria with FNF   Sensation: normal in both arms and legs.      MDM:  A. Problems (any 1)    High:    [x] Acute/Chronic Illness/injury posing threat to  Value Date    INR 1.28 (H) 12/31/2024    PROTIME 16.1 (H) 12/31/2024       Neuroimaging was independently reviewed by me and discussed results with the patient  I reviewed blood testing and other test results and discussed results with the patient      Impression:  Syncopal episode while at SNF   Patient has a history of seizure like activity with previous abnormal EEG with temporal sharp waves. Patient previously on Keppra but was not tolerated due to increased irritability. Patient was on zonisamide 200 mg nightly but unclear why discontinued.     Nurse reports increased agitation overnight requiring ativan and Zyprexa.     12/03/24: The patient is neurologically stabilized today.  The patient is able to follow only simple commands.    Recommendation  Avoid EEG testing given she is back to baseline and this is likely to exacerbate tendency for agitation.   Unable to obtain MRI Brain without secondary to pacemaker   Blood pressure goal <140/90  Continue Eliquis 5 mg BID   Continue home buspar and Prozac  Switch to lacosamide oral.  Continue atorvastatin 80 mg nightly     There is nothing else neurology can offer at this time.  Neurology will sign off.

## 2025-01-03 NOTE — PROGRESS NOTES
Inpatient Physical Therapy Evaluation & Treatment    Unit: [facility]  Date:  1/3/2025  Patient Name:    Rosaura Viveros  Admitting diagnosis:  Left-sided weakness [R53.1]  Pre-syncope [R55]  Stroke-like symptoms [R29.90]  Syncope and collapse [R55]  Admit Date:  12/31/2024  Precautions/Restrictions/WB Status/ Lines/ Wounds/ Oxygen: Fall risk, Bed/chair alarm, Lines (IV and external catheter), Telemetry, and Telesitter      Pt seen for cotreatment this date due to patient safety, patient endurance, complexity of condition, and acute illness/injury    Treatment Time:  3475 - 9323  Treatment Number:  1   Timed Code Treatment Minutes: 23 minutes  Total Treatment Minutes:  33  minutes    Patient Stated Goals for Therapy: \" to get better \"          Discharge Recommendations: Return to LTC with skilled therapy  DME needs for discharge: Defer to facility       Therapy recommendation for EMS Transport: requires transport by cot due to pt needs A x 2 for safe transfers    Therapy recommendations for staff:   Assist of 2 for transfers with use of rolling walker (RW) and gait belt to/from BSC  to/from chair    History of Present Illness:   Per admission H&P by Dr. Mooney on 12/31/25:  \"Rosaura Viveros is a 74 y.o. female with chronic systolic heart failure, hypertension, hyperlipidemia, T2DM with long-term use of insulin, CKD stage III, Lewy body dementia, mood disorder, seizure disorder, atrial fibrillation presented from nursing home after a syncopal episode.  Apparently EMS reported patient's blood pressure was in 70s although on arrival in ER patient had a normal systolic blood pressure.  Initially evaluated by ER provider noted left-sided upper and lower extremity weakness, stroke alert was not initiated due to unknown LKN that patient being on Eliquis.  ER provider confirmed with nursing home about left-sided weakness and they confirmed that this is not baseline.  During my evaluation patient was alert oriented x 2 unable to  in sitting: Max A   Scooting in supine:  Not Tested   Bridging:  Not Tested  Comments:     Transfer Training     Sit to stand:   Mod A   Stand to sit:   Mod A   Bed to/from Chair:  Mod A    Comments:     Gait gait deferred due to difficulty with transfers; pt ambulated 0 ft.   Distance:      2 ft pivot to the chair    Stair Training deferred, pt unsafe/ not appropriate to complete stairs at this time    Therapeutic Exercises Initiated  deferred secondary to pt fatigued    Positioning Needs   Pt reclined in chair  Call light provided and all needs within reach  RN aware of pt position/status  Telesitter present in room    Other Activities  See OT note for ADL's and UE activities    Patient/Family Education   Pt educated on role of inpatient PT, POC, importance of continued activity, DC recommendations, functional transfer/mobility safety, transfer techniques, and calling for assist with mobility.    Assessment  Pt is a 73 yo female presenting today for physical therapy Evaluation and Treatment in the acute care setting. Pt demonstrates increased pain, limited ROM, decreased strength, decreased endurance, and impaired balance, contributing to limitations during bed mobility, transfers, ambulation, stair negotiation, sitting tolerance, standing tolerance, and places the patient at an increased risk of falling. Today's treatment consisted of bed mobility, transfers training, activities to challenge sitting balance/tolerance, and activities to challenge standing balance/tolerance in order to address the above impairments and functional limitations. Continued skilled physical therapy is necessary to address the above impairments, in order to help the patient make a full return to PLOF without complaints of pain, difficulty or compensation.     Recommending return to LTC with skilled therapy.    Goals :   To be met in 3 visits:  1). Independent with LE Ex x 10 reps  2). Sit to/from stand: Min A   3). Bed to chair: Min A

## 2025-01-03 NOTE — PROGRESS NOTES
4 Eyes Skin Assessment     NAME:  Rosaura Viveros  YOB: 1950  MEDICAL RECORD NUMBER:  [id number]    The patient is being assessed for  Other low stefan score     I agree that at least one RN has performed a thorough Head to Toe Skin Assessment on the patient. ALL assessment sites listed below have been assessed.      Areas assessed by both nurses:    Head, Face, Ears, Shoulders, Back, Chest, Arms, Elbows, Hands, Sacrum. Buttock, Coccyx, Ischium, Legs. Feet and Heels, and Under Medical Devices         Blanchable redness to buttocks   Scattered bruising    Does the Patient have a Wound? No noted wound(s)       Stefan Prevention initiated by RN: Yes  Wound Care Orders initiated by RN: No    Pressure Injury (Stage 3,4, Unstageable, DTI, NWPT, and Complex wounds) if present, place Wound referral order by RN under : No    New Ostomies, if present place, Ostomy referral order under : No     Nurse 1 eSignature: Electronically signed by Keren Ceballos RN on 1/3/25 at 11:58 AM EST    **SHARE this note so that the co-signing nurse can place an eSignature**    Nurse 2 eSignature: Electronically signed by Jessica Mccallum RN on 1/3/25 at 12:07 PM EST

## 2025-01-03 NOTE — PROGRESS NOTES
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     Weights Reviewed: Yes   Admission weight: 90.7 kg (200 lb)   Wt Readings from Last 3 Encounters:   01/03/25 73.7 kg (162 lb 6.4 oz)   12/16/23 81.2 kg (179 lb)   09/23/23 81.2 kg (179 lb 0.2 oz)     Intake & Output Reviewed: Yes     Intake/Output Summary (Last 24 hours) at 1/3/2025 1059  Last data filed at 1/3/2025 0942  Gross per 24 hour   Intake 120 ml   Output 450 ml   Net -330 ml       ECHOCARDIOGRAM Reviewed: Yes   Patient's Ejection Fraction (EF) is greater than 40%     Medications Reviewed: Yes   SCHEDULED HOSPITAL MEDICATIONS:   lacosamide  100 mg Oral BID    atorvastatin  80 mg Oral Nightly    insulin lispro  0-4 Units SubCUTAneous 4x Daily AC & HS    apixaban  5 mg Oral BID    amLODIPine  10 mg Oral Daily    carvedilol  25 mg Oral BID WC    [Held by provider] furosemide  40 mg Oral QPM    trospium  20 mg Oral Nightly    hydrALAZINE  25 mg Oral 3 times per day    pantoprazole  40 mg Oral QAM AC    rOPINIRole  0.5 mg Oral Nightly    cefTRIAXone (ROCEPHIN) IV  1,000 mg IntraVENous Q24H    FLUoxetine  10 mg Oral Q24H    busPIRone  10 mg Oral 2 times per day     HOME MEDICATIONS:  Prior to Admission medications    Medication Sig Start Date End Date Taking? Authorizing Provider   acetaminophen (TYLENOL) 325 MG tablet Take 2 tablets by mouth every 4 hours as needed for Pain    Provider, MD Yobany   Exenatide

## 2025-01-03 NOTE — PLAN OF CARE
Problem: Safety - Adult  Goal: Free from fall injury  1/3/2025 0049 by Luh Laureano RN  Outcome: Progressing     Problem: Chronic Conditions and Co-morbidities  Goal: Patient's chronic conditions and co-morbidity symptoms are monitored and maintained or improved  1/3/2025 0049 by Luh Laureano RN  Outcome: Progressing     Problem: Discharge Planning  Goal: Discharge to home or other facility with appropriate resources  1/3/2025 0049 by Luh Laureano RN  Outcome: Progressing     Problem: Pain  Goal: Verbalizes/displays adequate comfort level or baseline comfort level  1/3/2025 0049 by Luh Laureano RN  Outcome: Progressing     Problem: ABCDS Injury Assessment  Goal: Absence of physical injury  Outcome: Progressing     Problem: Skin/Tissue Integrity  Goal: Absence of new skin breakdown  Description: 1.  Monitor for areas of redness and/or skin breakdown  2.  Assess vascular access sites hourly  3.  Every 4-6 hours minimum:  Change oxygen saturation probe site  4.  Every 4-6 hours:  If on nasal continuous positive airway pressure, respiratory therapy assess nares and determine need for appliance change or resting period.  Outcome: Progressing     Problem: Safety - Medical Restraint  Goal: Remains free of injury from restraints (Restraint for Interference with Medical Device)  Description: INTERVENTIONS:  1. Determine that other, less restrictive measures have been tried or would not be effective before applying the restraint  2. Evaluate the patient's condition at the time of restraint application  3. Inform patient/family regarding the reason for restraint  4. Q2H: Monitor safety, psychosocial status, comfort, nutrition and hydration  Outcome: Progressing     Problem: Neurosensory - Adult  Goal: Achieves stable or improved neurological status  Outcome: Progressing      Assessment/Plan:  1  Medial epicondylitis of right elbow  Ambulatory referral to PT/OT hand therapy   2  Pain in right elbow  CANCELED: XR elbow 3+ vw left   3  Ulnar neuropathy of right upper extremity         Scribe Attestation    I,:   Disha Hutchinson MA am acting as a scribe while in the presence of the attending physician :        I,:   Mark Kasper MD personally performed the services described in this documentation    as scribed in my presence :              I discussed with Gene Vivas today that his signs and symptoms are consistent with medial epicondylitis as well as ulnar neuropathy  He is tender to palpation over the medial epicondyle  He does have a positive tinel's  Treatment options were discussed in the form of formal physical therapy and bracing  He was agreeable to this  A referral was provided for physical therapy today  He was fitted and provided with a counterforce brace as well as a cock-up wrist brace  He was instructed to wear the counterforce brace during the day and the cock-up wrist brace at night  He will follow up in 6 weeks for follow up evaluation  If his pain is not improved at that time we may consider ordering an MRI at that time  Subjective:   Patricia Yu is a 79 y o  male who presents to the office today for evaluation of right elbow pain  Patient states this has been ongoing for the past 2 months  He notes pain to the medial aspect of his right elbow  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  He denies any known injury  He has tried Advil, Tylenol and motrin OTC which has not provided him with any pain relief  He also notes difficulty with finger flexion as well as numbness and tingling to his right small and ring finger  He does have a history of right elbow lateral epicondylitis debridement performed in the past        Review of Systems   Constitutional: Negative for chills and fever  HENT: Negative for drooling and sneezing  Eyes: Negative for redness  Respiratory: Negative for cough and wheezing  Gastrointestinal: Negative for nausea and vomiting  Musculoskeletal: Positive for arthralgias and myalgias  Negative for joint swelling  Neurological: Negative for weakness and numbness  Psychiatric/Behavioral: Negative for behavioral problems  The patient is not nervous/anxious  Past Medical History:   Diagnosis Date    Arthritis     shoulder       Past Surgical History:   Procedure Laterality Date    COLONOSCOPY N/A 12/12/2016    Procedure: COLONOSCOPY;  Surgeon: Preethi Velazquez MD;  [facility];   Service:     ELBOW SURGERY Bilateral     HERNIA REPAIR Right 2001    inguinal    KNEE ARTHROSCOPY Right     ROTATOR CUFF REPAIR Bilateral 2011       Family History   Problem Relation Age of Onset    No Known Problems Mother     No Known Problems Father     No Known Problems Sister     No Known Problems Brother     No Known Problems Maternal Aunt     No Known Problems Maternal Uncle     No Known Problems Paternal Aunt     No Known Problems Paternal Uncle     No Known Problems Maternal Grandmother     No Known Problems Maternal Grandfather     No Known Problems Paternal Grandmother     No Known Problems Paternal Grandfather     ADD / ADHD Neg Hx     Anesthesia problems Neg Hx     Cancer Neg Hx     Clotting disorder Neg Hx     Collagen disease Neg Hx     Diabetes Neg Hx     Dislocations Neg Hx     Learning disabilities Neg Hx     Neurological problems Neg Hx     Osteoporosis Neg Hx     Rheumatologic disease Neg Hx     Scoliosis Neg Hx     Vascular Disease Neg Hx        Social History     Occupational History    Not on file   Tobacco Use    Smoking status: Never Smoker    Smokeless tobacco: Never Used   Substance and Sexual Activity    Alcohol use: No    Drug use: No    Sexual activity: Not on file         Current Outpatient Medications:     gabapentin (NEURONTIN) 300 mg capsule, , Disp: , Rfl: 0    hydrOXYzine HCL (ATARAX) 25 mg tablet, Take 1 tablet (25 mg total) by mouth daily at bedtime, Disp: 30 tablet, Rfl: 0    levofloxacin (LEVAQUIN) 500 mg tablet, Take 500 mg by mouth daily, Disp: , Rfl: 0    omeprazole (PriLOSEC) 40 MG capsule, Take 1 capsule (40 mg total) by mouth daily, Disp: 30 capsule, Rfl: 2    No Known Allergies    Objective:  Vitals:    09/25/19 1426   BP: 109/66   Pulse: 97       Right Elbow Exam     Tenderness   The patient is experiencing tenderness in the medial epicondyle  Range of Motion   Extension: normal   Flexion: normal     Tests   Valgus: positive    Other   Erythema: absent  Sensation: normal  Pulse: present            Physical Exam   Constitutional: He is oriented to person, place, and time  He appears well-developed and well-nourished  HENT:   Head: Normocephalic and atraumatic  Eyes: Conjunctivae are normal  Right eye exhibits no discharge  Left eye exhibits no discharge  Neck: Normal range of motion  Neck supple  Cardiovascular: Normal rate and intact distal pulses  Pulmonary/Chest: Effort normal  No respiratory distress  Musculoskeletal:   As noted in HPI   Neurological: He is alert and oriented to person, place, and time  Skin: Skin is warm and dry  Psychiatric: He has a normal mood and affect  His behavior is normal  Judgment and thought content normal    Vitals reviewed  I have personally reviewed pertinent films in PACS and my interpretation is as follows:X-ray right elbow performed in the office today demonstrates no osseous abnormalities

## 2025-01-03 NOTE — CARE COORDINATION
INTERDISCIPLINARY PLAN OF CARE CONFERENCE    Date/Time: 1/3/2025 4:02 PM  Completed by: Ave Tracey RN, Case Management      Patient Name:  Rosaura Viveros  YOB: 1950  Admitting Diagnosis: Left-sided weakness [R53.1]  Pre-syncope [R55]  Stroke-like symptoms [R29.90]  Syncope and collapse [R55]     Admit Date/Time:  12/31/2024  9:17 PM    Chart reviewed. Interdisciplinary team contacted or reviewed plan related to patient progress and discharge plans.   Disciplines included Case Management, Nursing, and Dietitian.    Current Status:Stable  PT/OT recommendation for discharge plan of care:  Return to LTC with skilled therapy     Expected D/C Disposition:  Nursing Home  Confirmed plan with patient and/or family Yes confirmed with: (name) chiquita/ZENY Leroy  Met with:patient  Discharge Plan Comments: Reviewed chart and met with pt who is confused. Spoke with chiquita via phone and plan remain return to [facility] LTC. Updated Leonel at [facility] who states pt can return at [place]. Will cont to follow.     Home O2 in place on admit: No  Pt informed of need to bring portable home O2 tank on day of discharge for nursing to connect prior to leaving:  Not Indicated  Verbalized agreement/Understanding:  Not Indicated

## 2025-01-03 NOTE — PROGRESS NOTES
Nursing Core Measures for Stroke:   [x]   Education template documentation (STROKE/TIA).  [x]   Care Plan template documentation (Physiologic Instability - Neurosensory).   [x]   Verified Swallow Screen completed prior to PO intake of food, drink, medications>         [x]   VTE Prophylaxis: ordered/addressed; Eliquis             Patient and/or family member: not available / not appropriate for education at this timeNo family present and pt asleep at time of visit.   Verified stroke education booklet at bedside.         Electronically signed by Roseanne Rosenthal RN on 01/02/2025 @ 11:30

## 2025-01-04 LAB
ANION GAP SERPL CALCULATED.3IONS-SCNC: 10 MMOL/L (ref 3–16)
BUN SERPL-MCNC: 26 MG/DL (ref 7–20)
CALCIUM SERPL-MCNC: 7.8 MG/DL (ref 8.3–10.6)
CHLORIDE SERPL-SCNC: 108 MMOL/L (ref 99–110)
CO2 SERPL-SCNC: 25 MMOL/L (ref 21–32)
CREAT SERPL-MCNC: 2.2 MG/DL (ref 0.6–1.2)
GFR SERPLBLD CREATININE-BSD FMLA CKD-EPI: 23 ML/MIN/{1.73_M2}
GLUCOSE BLD-MCNC: 106 MG/DL (ref 70–99)
GLUCOSE BLD-MCNC: 114 MG/DL (ref 70–99)
GLUCOSE BLD-MCNC: 116 MG/DL (ref 70–99)
GLUCOSE BLD-MCNC: 161 MG/DL (ref 70–99)
GLUCOSE SERPL-MCNC: 97 MG/DL (ref 70–99)
PERFORMED ON: ABNORMAL
POTASSIUM SERPL-SCNC: 3.9 MMOL/L (ref 3.5–5.1)
SODIUM SERPL-SCNC: 143 MMOL/L (ref 136–145)

## 2025-01-04 PROCEDURE — 1200000000 HC SEMI PRIVATE

## 2025-01-04 PROCEDURE — 2500000003 HC RX 250 WO HCPCS

## 2025-01-04 PROCEDURE — 36415 COLL VENOUS BLD VENIPUNCTURE: CPT

## 2025-01-04 PROCEDURE — 6360000002 HC RX W HCPCS: Performed by: STUDENT IN AN ORGANIZED HEALTH CARE EDUCATION/TRAINING PROGRAM

## 2025-01-04 PROCEDURE — 6370000000 HC RX 637 (ALT 250 FOR IP): Performed by: INTERNAL MEDICINE

## 2025-01-04 PROCEDURE — 6370000000 HC RX 637 (ALT 250 FOR IP): Performed by: STUDENT IN AN ORGANIZED HEALTH CARE EDUCATION/TRAINING PROGRAM

## 2025-01-04 PROCEDURE — 99232 SBSQ HOSP IP/OBS MODERATE 35: CPT | Performed by: INTERNAL MEDICINE

## 2025-01-04 PROCEDURE — 80048 BASIC METABOLIC PNL TOTAL CA: CPT

## 2025-01-04 PROCEDURE — 6370000000 HC RX 637 (ALT 250 FOR IP): Performed by: PSYCHIATRY & NEUROLOGY

## 2025-01-04 RX ORDER — TRAMADOL HYDROCHLORIDE 50 MG/1
50 TABLET ORAL EVERY 12 HOURS PRN
Status: DISCONTINUED | OUTPATIENT
Start: 2025-01-04 | End: 2025-01-07 | Stop reason: HOSPADM

## 2025-01-04 RX ORDER — OLANZAPINE 5 MG/1
5 TABLET, ORALLY DISINTEGRATING ORAL NIGHTLY PRN
Status: DISCONTINUED | OUTPATIENT
Start: 2025-01-04 | End: 2025-01-07 | Stop reason: HOSPADM

## 2025-01-04 RX ORDER — AMOXICILLIN AND CLAVULANATE POTASSIUM 500; 125 MG/1; MG/1
1 TABLET, FILM COATED ORAL EVERY 12 HOURS SCHEDULED
Status: COMPLETED | OUTPATIENT
Start: 2025-01-04 | End: 2025-01-07

## 2025-01-04 RX ADMIN — LACOSAMIDE 100 MG: 50 TABLET, FILM COATED ORAL at 22:10

## 2025-01-04 RX ADMIN — BUSPIRONE HYDROCHLORIDE 10 MG: 10 TABLET ORAL at 14:27

## 2025-01-04 RX ADMIN — TRAMADOL HYDROCHLORIDE 50 MG: 50 TABLET ORAL at 15:33

## 2025-01-04 RX ADMIN — WATER 2 ML: 1 INJECTION INTRAMUSCULAR; INTRAVENOUS; SUBCUTANEOUS at 00:02

## 2025-01-04 RX ADMIN — APIXABAN 5 MG: 5 TABLET, FILM COATED ORAL at 14:29

## 2025-01-04 RX ADMIN — FLUOXETINE HYDROCHLORIDE 10 MG: 10 CAPSULE ORAL at 14:27

## 2025-01-04 RX ADMIN — TROSPIUM CHLORIDE 20 MG: 20 TABLET, FILM COATED ORAL at 22:10

## 2025-01-04 RX ADMIN — ROPINIROLE HYDROCHLORIDE 0.5 MG: 0.25 TABLET, FILM COATED ORAL at 22:10

## 2025-01-04 RX ADMIN — LACOSAMIDE 100 MG: 50 TABLET, FILM COATED ORAL at 14:29

## 2025-01-04 RX ADMIN — Medication 5 MG: at 15:34

## 2025-01-04 RX ADMIN — APIXABAN 5 MG: 5 TABLET, FILM COATED ORAL at 22:10

## 2025-01-04 RX ADMIN — CARVEDILOL 25 MG: 25 TABLET, FILM COATED ORAL at 17:49

## 2025-01-04 RX ADMIN — PANTOPRAZOLE SODIUM 40 MG: 40 TABLET, DELAYED RELEASE ORAL at 05:21

## 2025-01-04 RX ADMIN — ATORVASTATIN CALCIUM 80 MG: 40 TABLET, FILM COATED ORAL at 22:10

## 2025-01-04 RX ADMIN — HYDRALAZINE HYDROCHLORIDE 25 MG: 25 TABLET ORAL at 22:10

## 2025-01-04 RX ADMIN — AMOXICILLIN AND CLAVULANATE POTASSIUM 1 TABLET: 500; 125 TABLET, FILM COATED ORAL at 22:12

## 2025-01-04 RX ADMIN — OLANZAPINE 5 MG: 10 INJECTION, POWDER, FOR SOLUTION INTRAMUSCULAR at 00:01

## 2025-01-04 RX ADMIN — HYDRALAZINE HYDROCHLORIDE 25 MG: 25 TABLET ORAL at 05:21

## 2025-01-04 RX ADMIN — HYDRALAZINE HYDROCHLORIDE 25 MG: 25 TABLET ORAL at 14:27

## 2025-01-04 ASSESSMENT — PAIN DESCRIPTION - DESCRIPTORS: DESCRIPTORS: ACHING;CRAMPING

## 2025-01-04 ASSESSMENT — PAIN DESCRIPTION - LOCATION: LOCATION: LEG

## 2025-01-04 ASSESSMENT — PAIN DESCRIPTION - ORIENTATION: ORIENTATION: RIGHT;LEFT

## 2025-01-04 ASSESSMENT — PAIN - FUNCTIONAL ASSESSMENT: PAIN_FUNCTIONAL_ASSESSMENT: ACTIVITIES ARE NOT PREVENTED

## 2025-01-04 NOTE — PROGRESS NOTES
Bed side report given to STEPHANIE Arvizu. Call light within reach. Bed alarm on. Tele sitter in place.

## 2025-01-04 NOTE — FLOWSHEET NOTE
01/03/25 2000   Vital Signs   Temp 98.2 °F (36.8 °C)   Temp Source Oral   Pulse 69   Heart Rate Source Monitor   Respirations 18   BP (!) 146/93   MAP (Calculated) 111   BP Location Right upper arm   BP Method Automatic   Patient Position Semi fowlers;Lying left side     Care assumed for 7pm-7am shift. Pt awake this evening and alert to self only. She is complaining of some nausea and was holding a bag of what looked like undigested food and liquid. She is tearful this evening and asks repetitive questions. P.O. zofran ordered and administered. Pt also complaining of pain throughout her back and her legs with the left being worse than the right. She attributes this to \"old age\". VS obtained and assessment performed. Pt given opportunity to ask questions, POC reviewed and all needs addressed at this time. Call light within reach, bed locked and low and TSM remains in place for pt safety.

## 2025-01-04 NOTE — PLAN OF CARE
Problem: Safety - Adult  Goal: Free from fall injury  Outcome: Progressing     Problem: Chronic Conditions and Co-morbidities  Goal: Patient's chronic conditions and co-morbidity symptoms are monitored and maintained or improved  Outcome: Progressing     Problem: Neurosensory - Adult  Goal: Achieves stable or improved neurological status  Outcome: Progressing

## 2025-01-04 NOTE — PLAN OF CARE
Problem: Safety - Adult  Goal: Free from fall injury  1/3/2025 2204 by Luh Laureano RN  Outcome: Progressing     Problem: Chronic Conditions and Co-morbidities  Goal: Patient's chronic conditions and co-morbidity symptoms are monitored and maintained or improved  1/3/2025 2204 by Luh Laureano RN  Outcome: Progressing     Problem: Discharge Planning  Goal: Discharge to home or other facility with appropriate resources  1/3/2025 2204 by Luh Laureano RN  Outcome: Progressing     Problem: Pain  Goal: Verbalizes/displays adequate comfort level or baseline comfort level  1/3/2025 2204 by Luh Laureano RN  Outcome: Progressing  Problem: ABCDS Injury Assessment  Goal: Absence of physical injury  Outcome: Progressing     Problem: Skin/Tissue Integrity  Goal: Absence of new skin breakdown  Description: 1.  Monitor for areas of redness and/or skin breakdown  2.  Assess vascular access sites hourly  3.  Every 4-6 hours minimum:  Change oxygen saturation probe site  4.  Every 4-6 hours:  If on nasal continuous positive airway pressure, respiratory therapy assess nares and determine need for appliance change or resting period.  1/3/2025 2204 by Luh Laureano RN  Outcome: Progressing     Problem: Safety - Medical Restraint  Goal: Remains free of injury from restraints (Restraint for Interference with Medical Device)  Description: INTERVENTIONS:  1. Determine that other, less restrictive measures have been tried or would not be effective before applying the restraint  2. Evaluate the patient's condition at the time of restraint application  3. Inform patient/family regarding the reason for restraint  4. Q2H: Monitor safety, psychosocial status, comfort, nutrition and hydration  Outcome: Progressing     Problem: Neurosensory - Adult  Goal: Achieves stable or improved neurological status  Outcome: Progressing  Flowsheets (Taken 1/3/2025 0942 by Keren Ceballos, RN)

## 2025-01-04 NOTE — PROGRESS NOTES
IM Progress Note    Admit Date:  12/31/2024    Reported syncopal episode while on commode   New onset left-sided weakness    Nursing home patient from [place].  Partially oriented.  Has underlying Lewy body dementia.  DNR CC.   Presented with syncopal episode and left-sided weakness.   Seen by neurology.  Current workup suggesting seizure disorder, patient was supposed to be on Vimpat but was not getting it.   CT head was negative, however MRI of the brain ordered and could not be done as patient has a pacemaker.   Patient is on chronic anticoagulation    Also issues with sundowning, pt  was getting agitated last night and getting more confused -required restraints    Subjective:  Ms. Viveros seen .   Recurrent confusion/ agitation last night. Got seroquel and zyprexa.   Sleepy all day - calm now. Eating dinner   awake and partially oriented to person.     Currently not in restraints.        Objective:   Patient Vitals for the past 4 hrs:   BP Temp Temp src Pulse Resp SpO2   01/04/25 1425 (!) 142/66 98.1 °F (36.7 °C) Oral 85 18 95 %     VSS -reviewed     Intake/Output Summary (Last 24 hours) at 1/4/2025 1746  Last data filed at 1/4/2025 0452  Gross per 24 hour   Intake 120 ml   Output 700 ml   Net -580 ml       Physical Exam:    Gen: No distress. Alert. Appears chronically ill, older than stated age  Eyes: PERRL. No sclera icterus. No conjunctival injection.   ENT: No discharge. Pharynx clear.   Neck: No JVD.  Trachea midline.  Resp: No accessory muscle use. No crackles. No wheezes. No rhonchi.   CV: Regular rate. Regular rhythm. No murmur.  No rub. No edema.   Capillary Refill: Brisk,< 3 seconds   GI: Non-tender. Non-distended.  Normal bowel sounds.  Skin: Warm and dry. No nodule on exposed extremities. No rash on exposed extremities.   M/S: No cyanosis. No joint deformity. No clubbing.   Neuro: Awake. +left-sided upper extremity weakness   Pt doesn't ambulate at baseline, wheelchair bound   Psych: Oriented x  finding   - on eliquis and statin   - per neuro, no need for ASA, will discontinue.   -started on Vimpat per neurology.    -> Syncopal episode likely secondary to seizure ; patient was on  Vimpat previously-> resumed now ->  continue the same.  Cannot rule out acute CVA,  MRI  of the brain could not be done-continue anticoagulation with Eliquis;  continue statins.      Elevated Creatinine on CKD stage   - baseline creatinine appears to be 1.4  - creatinine on admission 1.8  -Given IVF given   - Hold diuretics-Lasix; hold irbesartan  - bladder scan completed, no evidence of retention   - check urinalysis- +UTI . See below   - strict I/O  - Creatinine trended up to 2.3 t- continued IVF for 1 more day- pt has pulled out her IV repeatedly. Hold further IVF and monitor BMP  - crtn 2.2 today    E. Coli UTI.  Acute Cystitis with hematuria   - urinalysis as above  - urine culture growing E. coli  - given IV Rocephin; sensitivites reviewed- switch to Po augmentin    DM type 2  - holding home regimen   - SSI  - monitor glucose  - checked A1c  6.2 %  - carb control diet  - monitor glucose     Chronic systolic CHF  - holding lasix, Irbesartan in setting of hypotension and THELMA  - appears dry - hydrated gently   - daily weights, low sodium diet, strict I/IO    HTN  - held hydralazine, lasix, coreg, and Norvasc initially to allow permissive HTN  -Monitored blood pressures, check orthostats - resumed on hydralazine, Coreg and Norvasc    Atrial Fibrillation   S/p PPM  - resume eliquis   - Secondary hypercoagulable state in a patient with atrial fibrillation  - monitor on telemetry      Lewy body dementia    With agitation   - delirium precautions  -patient having sundowning and confusion and agitation at night  - Recurrent issues with agitation confusion and sundowning in the evenings.    Start melatonin qhs. Add prn zyprexa nightly . Hold seroquel     Depression  - resume Prozac and Buspar     Chronic anemia  - stable   - monitor

## 2025-01-04 NOTE — PROGRESS NOTES
BP (!) 142/70   Pulse 79   Temp 97.7 °F (36.5 °C) (Axillary)   Resp 16   Ht 1.6 m (5' 3\")   Wt 74.1 kg (163 lb 6.4 oz)   SpO2 98%   BMI 28.95 kg/m²     Assessment complete. Pt refusing meds at this time. Pt sleeping, easily arousable. Pt denies needs at this time.

## 2025-01-05 LAB
GLUCOSE BLD-MCNC: 116 MG/DL (ref 70–99)
GLUCOSE BLD-MCNC: 130 MG/DL (ref 70–99)
GLUCOSE BLD-MCNC: 141 MG/DL (ref 70–99)
GLUCOSE BLD-MCNC: 198 MG/DL (ref 70–99)
PERFORMED ON: ABNORMAL

## 2025-01-05 PROCEDURE — 1200000000 HC SEMI PRIVATE

## 2025-01-05 PROCEDURE — 6370000000 HC RX 637 (ALT 250 FOR IP): Performed by: STUDENT IN AN ORGANIZED HEALTH CARE EDUCATION/TRAINING PROGRAM

## 2025-01-05 PROCEDURE — 6370000000 HC RX 637 (ALT 250 FOR IP): Performed by: INTERNAL MEDICINE

## 2025-01-05 PROCEDURE — 51701 INSERT BLADDER CATHETER: CPT

## 2025-01-05 PROCEDURE — 99232 SBSQ HOSP IP/OBS MODERATE 35: CPT | Performed by: INTERNAL MEDICINE

## 2025-01-05 PROCEDURE — 51798 US URINE CAPACITY MEASURE: CPT

## 2025-01-05 PROCEDURE — 6370000000 HC RX 637 (ALT 250 FOR IP): Performed by: PSYCHIATRY & NEUROLOGY

## 2025-01-05 RX ADMIN — AMOXICILLIN AND CLAVULANATE POTASSIUM 1 TABLET: 500; 125 TABLET, FILM COATED ORAL at 10:46

## 2025-01-05 RX ADMIN — LACOSAMIDE 100 MG: 50 TABLET, FILM COATED ORAL at 10:46

## 2025-01-05 RX ADMIN — FLUOXETINE HYDROCHLORIDE 10 MG: 10 CAPSULE ORAL at 15:26

## 2025-01-05 RX ADMIN — ATORVASTATIN CALCIUM 80 MG: 40 TABLET, FILM COATED ORAL at 22:06

## 2025-01-05 RX ADMIN — PANTOPRAZOLE SODIUM 40 MG: 40 TABLET, DELAYED RELEASE ORAL at 05:32

## 2025-01-05 RX ADMIN — TROSPIUM CHLORIDE 20 MG: 20 TABLET, FILM COATED ORAL at 22:06

## 2025-01-05 RX ADMIN — AMOXICILLIN AND CLAVULANATE POTASSIUM 1 TABLET: 500; 125 TABLET, FILM COATED ORAL at 22:06

## 2025-01-05 RX ADMIN — HYDRALAZINE HYDROCHLORIDE 25 MG: 25 TABLET ORAL at 15:26

## 2025-01-05 RX ADMIN — BUSPIRONE HYDROCHLORIDE 10 MG: 10 TABLET ORAL at 15:26

## 2025-01-05 RX ADMIN — LACOSAMIDE 100 MG: 50 TABLET, FILM COATED ORAL at 22:06

## 2025-01-05 RX ADMIN — TRAMADOL HYDROCHLORIDE 50 MG: 50 TABLET ORAL at 06:16

## 2025-01-05 RX ADMIN — APIXABAN 5 MG: 5 TABLET, FILM COATED ORAL at 22:06

## 2025-01-05 RX ADMIN — APIXABAN 5 MG: 5 TABLET, FILM COATED ORAL at 10:46

## 2025-01-05 RX ADMIN — HYDRALAZINE HYDROCHLORIDE 25 MG: 25 TABLET ORAL at 22:06

## 2025-01-05 RX ADMIN — BUSPIRONE HYDROCHLORIDE 10 MG: 10 TABLET ORAL at 10:46

## 2025-01-05 RX ADMIN — ROPINIROLE HYDROCHLORIDE 0.5 MG: 0.25 TABLET, FILM COATED ORAL at 22:06

## 2025-01-05 RX ADMIN — HYDRALAZINE HYDROCHLORIDE 25 MG: 25 TABLET ORAL at 05:32

## 2025-01-05 RX ADMIN — OLANZAPINE 5 MG: 5 TABLET, ORALLY DISINTEGRATING ORAL at 22:06

## 2025-01-05 RX ADMIN — CARVEDILOL 25 MG: 25 TABLET, FILM COATED ORAL at 10:46

## 2025-01-05 RX ADMIN — AMLODIPINE BESYLATE 10 MG: 5 TABLET ORAL at 10:46

## 2025-01-05 ASSESSMENT — PAIN SCALES - GENERAL
PAINLEVEL_OUTOF10: 0
PAINLEVEL_OUTOF10: 8

## 2025-01-05 ASSESSMENT — PAIN DESCRIPTION - LOCATION: LOCATION: BACK

## 2025-01-05 ASSESSMENT — PAIN DESCRIPTION - FREQUENCY: FREQUENCY: CONTINUOUS

## 2025-01-05 ASSESSMENT — PAIN DESCRIPTION - ONSET: ONSET: ON-GOING

## 2025-01-05 ASSESSMENT — PAIN DESCRIPTION - PAIN TYPE: TYPE: CHRONIC PAIN

## 2025-01-05 ASSESSMENT — PAIN DESCRIPTION - DESCRIPTORS: DESCRIPTORS: ACHING;DISCOMFORT

## 2025-01-05 ASSESSMENT — PAIN - FUNCTIONAL ASSESSMENT: PAIN_FUNCTIONAL_ASSESSMENT: PREVENTS OR INTERFERES SOME ACTIVE ACTIVITIES AND ADLS

## 2025-01-05 ASSESSMENT — PAIN DESCRIPTION - ORIENTATION: ORIENTATION: MID;LOWER

## 2025-01-05 NOTE — PROGRESS NOTES
IM Progress Note    Admit Date:  12/31/2024    Reported syncopal episode while on commode   New onset left-sided weakness    Nursing home patient from [place].  Partially oriented.  Has underlying Lewy body dementia.  DNR CC.   Presented with syncopal episode and left-sided weakness.   Seen by neurology.  Current workup suggesting seizure disorder, patient was supposed to be on Vimpat but was not getting it.   CT head was negative, however MRI of the brain ordered and could not be done as patient has a pacemaker.   Patient is on chronic anticoagulation    Recurrent  issues with sundowning, patient getting agitated during restraints, was also given  pr Seroquel and Zyprexa couple of nights ago .    --> Currently off restraints    Subjective:  Ms. Viveros seen .  She is calm and cooperative now.  No distress.  Awake and partially oriented to person.  Currently not in restraints.        Objective:      Vitals:    01/05/25 0530 01/05/25 0534 01/05/25 0616 01/05/25 0932   BP: (!) 135/92   (!) 157/63   Pulse: 70   74   Resp: 16  20 22   Temp: 97.7 °F (36.5 °C)   97.7 °F (36.5 °C)   TempSrc: Oral   Oral   SpO2: 93%   95%   Weight:  74.4 kg (164 lb)     Height:           Intake/Output Summary (Last 24 hours) at 1/5/2025 1340  Last data filed at 1/5/2025 0616  Gross per 24 hour   Intake --   Output 700 ml   Net -700 ml       Physical Exam:    Gen: No distress. Alert. Appears chronically ill, older than stated age  Eyes: PERRL. No sclera icterus. No conjunctival injection.   ENT: No discharge. Pharynx clear.   Neck: No JVD.  Trachea midline.  Resp: No accessory muscle use. No crackles. No wheezes. No rhonchi.   CV: Regular rate. Regular rhythm. No murmur.  No rub. No edema.   Capillary Refill: Brisk,< 3 seconds   GI: Non-tender. Non-distended.  Normal bowel sounds.  Skin: Warm and dry. No nodule on exposed extremities. No rash on exposed extremities.   M/S: No cyanosis. No joint deformity. No clubbing.   Neuro: Awake.  brain examination.   2. Global parenchymal volume loss with nonspecific white matter changes   likely related to chronic microvascular ischemic change.   3. No significant vascular abnormality in the head or neck.   4. 40% stenosis in the proximal right ICA by NASCET criteria.   5. 20% stenosis in the proximal left ICA due to calcific atherosclerosis.            ECHO    Left Ventricle: Technically difficult study.  There is poor endomyocardial border definition but globally LV function appears normal with a visually estimated EF of 55 - 60%. Left ventricle size is normal. Mildly increased wall thickness. No obvious regional wall motion abnormalities within the limits of this exam. Grade I diastolic dysfunction with normal LAP.    Right Ventricle: Not well visualized. Unable to assess systolic function.    Mitral Valve: Mild-moderate annular calcification at the posterior leaflet.  Individual leaflets not well-visualized. Mild regurgitation. No stenosis noted.    Aortic Valve: Not well visualized. No regurgitation. No stenosis by Doppler criteria.    Tricuspid Valve: Not well visualized. Trace regurgitation.    Pulmonic Valve: The pulmonic valve was not well visualized.    Image quality is technically difficult. Pt confused and laying towards the right side.         Assessment/Plan:    Syncope and Collapse  New onset left-sided weakness  Hx of Seizure, concern for seizure   - CT head and CTA of head and neck showed no intracranial hemorrhage. Does show some encephalomalacia changes in the thalami and basal ganglia. MRI ordered. Likely  microvascular ischemic changes. 40% of the right ICA and 20% stenosis of the left ICA  - neurology consulted  - not on any seizure regimen prior to admit   - MRI of brain ordered, cannot have done here due to pacemaker.  - echo ordered- done- EF 55 to 60 %, G1 DD  - seizure precautions ordered  - monitor on telemetry   - x-ray of left shoulder showed no acute finding   - on eliquis and  statin   - per neuro, no need for ASA, will discontinue.   -started on Vimpat per neurology.    -> Syncopal episode likely secondary to seizure ; patient was on Vimpat previously-> resumed now ->  continue the same.  Cannot rule out acute CVA,  MRI  of the brain could not be done-continue anticoagulation with Eliquis;  continue statins.      Elevated Creatinine on CKD stage   - baseline creatinine appears to be 1.4  - creatinine on admission 1.8  -Given IVF given   - Hold diuretics-Lasix; hold irbesartan  - bladder scan completed, no evidence of retention   - check urinalysis- +UTI . See below   - strict I/O  - Creatinine trended up to 2.3 t- continued IVF for 1 more day- pt has pulled out her IV repeatedly. Hold further IVF and monitor BMP  - crtn 2.2 today.  Continue to monitor BMP    E. Coli UTI.  Acute Cystitis with hematuria   - urinalysis as above  - urine culture growing E. coli  - given IV Rocephin; sensitivites reviewed- switched to Po augmentin    DM type 2  - holding home regimen   -Blood sugars have been stable , will stop sliding scale insulin orders.  - checked A1c  6.2 %  - carb control diet    Chronic systolic CHF  - holding lasix, Irbesartan in setting of hypotension and THELMA  - appears dry - hydrated gently   - daily weights, low sodium diet, strict I/IO      HTN  - held hydralazine, lasix, coreg, and Norvasc initially to allow permissive HTN  -Monitored blood pressures, check orthostats - resumed on hydralazine, Coreg and Norvasc    Atrial Fibrillation   S/p PPM  - resume eliquis   - Secondary hypercoagulable state in a patient with atrial fibrillation  - monitor on telemetry      Lewy body dementia    With agitation   - delirium precautions  -patient having sundowning and confusion and agitation at night  - Recurrent issues with agitation confusion and sundowning in the evenings.    Start melatonin qhs. Added prn zyprexa nightly . Hold seroquel .      Depression  - resume Prozac and Buspar

## 2025-01-05 NOTE — FLOWSHEET NOTE
01/04/25 2210   Vital Signs   Temp 98.4 °F (36.9 °C)   Temp Source Oral   Pulse 72   Heart Rate Source Monitor   BP (!) 128/57   MAP (Calculated) 81   BP Location Right upper arm   BP Method Automatic   Patient Position Lying left side     Care assumed for 7pm-7am shift. Pt resting peacefully laying on left side. She is easily arousable and remains at her baseline confusion. Pt able to swallow evening pills with no difficulty. VS obtained and assessment performed. Pt given opportunity to ask questions, POC reviewed and all needs addressed at this time. Call light within reach and bed locked and low.

## 2025-01-05 NOTE — PLAN OF CARE
Problem: Safety - Adult  Goal: Free from fall injury  1/4/2025 2347 by Luh Laureano RN  Outcome: Progressing     Problem: Chronic Conditions and Co-morbidities  Goal: Patient's chronic conditions and co-morbidity symptoms are monitored and maintained or improved  1/4/2025 2347 by Luh Laureano RN  Outcome: Progressing     Problem: Discharge Planning  Goal: Discharge to home or other facility with appropriate resources  1/4/2025 2347 by Luh Laureano RN  Outcome: Progressing     Problem: Pain  Goal: Verbalizes/displays adequate comfort level or baseline comfort level  1/4/2025 2347 by Luh Laureano RN  Outcome: Progressing     Problem: ABCDS Injury Assessment  Goal: Absence of physical injury  1/4/2025 2347 by Luh Laureano RN  Outcome: Progressing     Problem: Skin/Tissue Integrity  Goal: Absence of new skin breakdown  Description: 1.  Monitor for areas of redness and/or skin breakdown  2.  Assess vascular access sites hourly  3.  Every 4-6 hours minimum:  Change oxygen saturation probe site  4.  Every 4-6 hours:  If on nasal continuous positive airway pressure, respiratory therapy assess nares and determine need for appliance change or resting period.  1/4/2025 2347 by Luh Laureano RN  Outcome: Progressing     Problem: Safety - Medical Restraint  Goal: Remains free of injury from restraints (Restraint for Interference with Medical Device)  Description: INTERVENTIONS:  1. Determine that other, less restrictive measures have been tried or would not be effective before applying the restraint  2. Evaluate the patient's condition at the time of restraint application  3. Inform patient/family regarding the reason for restraint  4. Q2H: Monitor safety, psychosocial status, comfort, nutrition and hydration  1/4/2025 2347 by Luh Laureano RN  Outcome: Progressing     Problem: Neurosensory - Adult  Goal: Achieves stable or improved neurological status  1/4/2025 2347 by Luh Laureano RN  Outcome:  Progressing

## 2025-01-05 NOTE — PROGRESS NOTES
Pt hasn't voided all evening. This RN bladder scanned pt which showed 703mL. Pt was assisted to bathroom toilet with help of stemarcos to try and urinate.     Pt attempted to urinate on toilet but was unable to. She states that it is because of her back pain. Pt was then assisted back to her bed and was told that the urine cannot remain in her bladder and that she would need to straight cathed. Pt unsure but agreeable to this.         This RN straight cathed pt with an immediate return of 700mL of straw colored urine. Pt tolerated procedure well.

## 2025-01-06 LAB
ANION GAP SERPL CALCULATED.3IONS-SCNC: 10 MMOL/L (ref 3–16)
BUN SERPL-MCNC: 28 MG/DL (ref 7–20)
CALCIUM SERPL-MCNC: 8.3 MG/DL (ref 8.3–10.6)
CHLORIDE SERPL-SCNC: 108 MMOL/L (ref 99–110)
CO2 SERPL-SCNC: 26 MMOL/L (ref 21–32)
CREAT SERPL-MCNC: 2.5 MG/DL (ref 0.6–1.2)
GFR SERPLBLD CREATININE-BSD FMLA CKD-EPI: 20 ML/MIN/{1.73_M2}
GLUCOSE BLD-MCNC: 134 MG/DL (ref 70–99)
GLUCOSE BLD-MCNC: 136 MG/DL (ref 70–99)
GLUCOSE BLD-MCNC: 165 MG/DL (ref 70–99)
GLUCOSE BLD-MCNC: 225 MG/DL (ref 70–99)
GLUCOSE SERPL-MCNC: 123 MG/DL (ref 70–99)
PERFORMED ON: ABNORMAL
POTASSIUM SERPL-SCNC: 4.4 MMOL/L (ref 3.5–5.1)
SODIUM SERPL-SCNC: 144 MMOL/L (ref 136–145)

## 2025-01-06 PROCEDURE — 6370000000 HC RX 637 (ALT 250 FOR IP): Performed by: STUDENT IN AN ORGANIZED HEALTH CARE EDUCATION/TRAINING PROGRAM

## 2025-01-06 PROCEDURE — 6370000000 HC RX 637 (ALT 250 FOR IP): Performed by: INTERNAL MEDICINE

## 2025-01-06 PROCEDURE — 80048 BASIC METABOLIC PNL TOTAL CA: CPT

## 2025-01-06 PROCEDURE — 2580000003 HC RX 258: Performed by: INTERNAL MEDICINE

## 2025-01-06 PROCEDURE — 51798 US URINE CAPACITY MEASURE: CPT

## 2025-01-06 PROCEDURE — 99233 SBSQ HOSP IP/OBS HIGH 50: CPT | Performed by: INTERNAL MEDICINE

## 2025-01-06 PROCEDURE — 51701 INSERT BLADDER CATHETER: CPT

## 2025-01-06 PROCEDURE — 36415 COLL VENOUS BLD VENIPUNCTURE: CPT

## 2025-01-06 PROCEDURE — 1200000000 HC SEMI PRIVATE

## 2025-01-06 PROCEDURE — 6370000000 HC RX 637 (ALT 250 FOR IP): Performed by: PSYCHIATRY & NEUROLOGY

## 2025-01-06 RX ORDER — SODIUM CHLORIDE, SODIUM LACTATE, POTASSIUM CHLORIDE, CALCIUM CHLORIDE 600; 310; 30; 20 MG/100ML; MG/100ML; MG/100ML; MG/100ML
INJECTION, SOLUTION INTRAVENOUS CONTINUOUS
Status: DISCONTINUED | OUTPATIENT
Start: 2025-01-06 | End: 2025-01-07

## 2025-01-06 RX ADMIN — AMOXICILLIN AND CLAVULANATE POTASSIUM 1 TABLET: 500; 125 TABLET, FILM COATED ORAL at 10:05

## 2025-01-06 RX ADMIN — FLUOXETINE HYDROCHLORIDE 10 MG: 10 CAPSULE ORAL at 17:05

## 2025-01-06 RX ADMIN — ROPINIROLE HYDROCHLORIDE 0.5 MG: 0.25 TABLET, FILM COATED ORAL at 20:48

## 2025-01-06 RX ADMIN — Medication 5 MG: at 20:52

## 2025-01-06 RX ADMIN — PANTOPRAZOLE SODIUM 40 MG: 40 TABLET, DELAYED RELEASE ORAL at 06:18

## 2025-01-06 RX ADMIN — AMOXICILLIN AND CLAVULANATE POTASSIUM 1 TABLET: 500; 125 TABLET, FILM COATED ORAL at 20:53

## 2025-01-06 RX ADMIN — BUSPIRONE HYDROCHLORIDE 10 MG: 10 TABLET ORAL at 10:05

## 2025-01-06 RX ADMIN — APIXABAN 5 MG: 5 TABLET, FILM COATED ORAL at 20:48

## 2025-01-06 RX ADMIN — HYDRALAZINE HYDROCHLORIDE 25 MG: 25 TABLET ORAL at 17:06

## 2025-01-06 RX ADMIN — AMLODIPINE BESYLATE 10 MG: 5 TABLET ORAL at 10:05

## 2025-01-06 RX ADMIN — TRAMADOL HYDROCHLORIDE 50 MG: 50 TABLET ORAL at 04:14

## 2025-01-06 RX ADMIN — TROSPIUM CHLORIDE 20 MG: 20 TABLET, FILM COATED ORAL at 20:48

## 2025-01-06 RX ADMIN — OLANZAPINE 5 MG: 5 TABLET, ORALLY DISINTEGRATING ORAL at 20:49

## 2025-01-06 RX ADMIN — CARVEDILOL 25 MG: 25 TABLET, FILM COATED ORAL at 10:05

## 2025-01-06 RX ADMIN — HYDRALAZINE HYDROCHLORIDE 25 MG: 25 TABLET ORAL at 06:18

## 2025-01-06 RX ADMIN — LACOSAMIDE 100 MG: 50 TABLET, FILM COATED ORAL at 20:48

## 2025-01-06 RX ADMIN — SODIUM CHLORIDE, SODIUM LACTATE, POTASSIUM CHLORIDE, AND CALCIUM CHLORIDE: .6; .31; .03; .02 INJECTION, SOLUTION INTRAVENOUS at 22:06

## 2025-01-06 RX ADMIN — APIXABAN 5 MG: 5 TABLET, FILM COATED ORAL at 10:05

## 2025-01-06 RX ADMIN — HYDRALAZINE HYDROCHLORIDE 25 MG: 25 TABLET ORAL at 20:48

## 2025-01-06 RX ADMIN — ATORVASTATIN CALCIUM 80 MG: 40 TABLET, FILM COATED ORAL at 20:49

## 2025-01-06 RX ADMIN — LACOSAMIDE 100 MG: 50 TABLET, FILM COATED ORAL at 10:05

## 2025-01-06 RX ADMIN — SODIUM CHLORIDE, SODIUM LACTATE, POTASSIUM CHLORIDE, AND CALCIUM CHLORIDE: .6; .31; .03; .02 INJECTION, SOLUTION INTRAVENOUS at 14:02

## 2025-01-06 RX ADMIN — BUSPIRONE HYDROCHLORIDE 10 MG: 10 TABLET ORAL at 17:06

## 2025-01-06 ASSESSMENT — PAIN - FUNCTIONAL ASSESSMENT: PAIN_FUNCTIONAL_ASSESSMENT: PREVENTS OR INTERFERES SOME ACTIVE ACTIVITIES AND ADLS

## 2025-01-06 ASSESSMENT — PAIN DESCRIPTION - ORIENTATION: ORIENTATION: LOWER;MID

## 2025-01-06 ASSESSMENT — PAIN DESCRIPTION - DESCRIPTORS: DESCRIPTORS: ACHING;DISCOMFORT;THROBBING

## 2025-01-06 ASSESSMENT — PAIN DESCRIPTION - LOCATION: LOCATION: ABDOMEN;BACK

## 2025-01-06 NOTE — CARE COORDINATION
INTERDISCIPLINARY PLAN OF CARE CONFERENCE    Date/Time: 1/6/2025 4:23 PM  Completed by: Ave Tracey RN, Case Management      Patient Name:  Rosaura Viveros  YOB: 1950  Admitting Diagnosis: Left-sided weakness [R53.1]  Pre-syncope [R55]  Stroke-like symptoms [R29.90]  Syncope and collapse [R55]     Admit Date/Time:  12/31/2024  9:17 PM    Chart reviewed. Interdisciplinary team contacted or reviewed plan related to patient progress and discharge plans.   Disciplines included Case Management, Nursing, and Dietitian.    Current Status:Stable  PT/OT recommendation for discharge plan of care:  Return to LTC with skilled therapy     Expected D/C Disposition:  Nursing Home  Confirmed plan with patient and/or family Yes confirmed with: (name) chiquita Leroy  Met with:Spoke with chiquita via phone  Discharge Plan Comments: Reviewed chart and spoke with chiquita Leroy via phone and updated. Plan remains return to [facility] LTC. Will cont to follow.    Home O2 in place on admit: No  Pt informed of need to bring portable home O2 tank on day of discharge for nursing to connect prior to leaving:  Not Indicated  Verbalized agreement/Understanding:  Not Indicated

## 2025-01-06 NOTE — PLAN OF CARE
Problem: Safety - Adult  Goal: Free from fall injury  Outcome: Progressing     Problem: Chronic Conditions and Co-morbidities  Goal: Patient's chronic conditions and co-morbidity symptoms are monitored and maintained or improved  Outcome: Progressing     Problem: Discharge Planning  Goal: Discharge to home or other facility with appropriate resources  Outcome: Progressing     Problem: Pain  Goal: Verbalizes/displays adequate comfort level or baseline comfort level  Outcome: Progressing     Problem: ABCDS Injury Assessment  Goal: Absence of physical injury  Outcome: Progressing     Problem: Skin/Tissue Integrity  Goal: Absence of new skin breakdown  Description: 1.  Monitor for areas of redness and/or skin breakdown  2.  Assess vascular access sites hourly  3.  Every 4-6 hours minimum:  Change oxygen saturation probe site  4.  Every 4-6 hours:  If on nasal continuous positive airway pressure, respiratory therapy assess nares and determine need for appliance change or resting period.  Outcome: Progressing     Problem: Safety - Medical Restraint  Goal: Remains free of injury from restraints (Restraint for Interference with Medical Device)  Description: INTERVENTIONS:  1. Determine that other, less restrictive measures have been tried or would not be effective before applying the restraint  2. Evaluate the patient's condition at the time of restraint application  3. Inform patient/family regarding the reason for restraint  4. Q2H: Monitor safety, psychosocial status, comfort, nutrition and hydration  Outcome: Progressing     Problem: Neurosensory - Adult  Goal: Achieves stable or improved neurological status  Outcome: Progressing

## 2025-01-06 NOTE — PROGRESS NOTES
IM Progress Note    Admit Date:  12/31/2024    Reported syncopal episode while on commode   New onset left-sided weakness    Nursing home patient from [place].  Partially oriented.  Has underlying Lewy body dementia.  DNR CC.   Presented with syncopal episode and left-sided weakness.   Seen by neurology.  Current workup suggesting seizure disorder, patient was supposed to be on Vimpat but was not getting it.   CT head was negative, however MRI of the brain ordered and could not be done as patient has a pacemaker.   Patient is on chronic anticoagulation    Recurrent  issues with sundowning, patient getting agitated during restraints, was also given  pr Seroquel and Zyprexa couple of nights ago .    --> Currently off restraints    Subjective:  Ms. Viveros seen .  She is calm and cooperative now.  No distress.  Awake and partially oriented to person.  Currently not in restraints.      Creatinine is up slightly to 2.5      Objective:      Vitals:    01/06/25 0400 01/06/25 0600 01/06/25 0900 01/06/25 1005   BP: (!) 154/66  (!) 148/93 (!) 165/91   Pulse: 71  73 81   Resp: 22  20    Temp: 97.5 °F (36.4 °C)  98.2 °F (36.8 °C)    TempSrc: Oral  Oral    SpO2: 94%  95%    Weight:  75.6 kg (166 lb 11.2 oz)     Height:           Intake/Output Summary (Last 24 hours) at 1/6/2025 1324  Last data filed at 1/6/2025 0415  Gross per 24 hour   Intake 120 ml   Output 400 ml   Net -280 ml       Physical Exam:    Gen: No distress. Alert. Appears chronically ill, older than stated age  Eyes: PERRL. No sclera icterus. No conjunctival injection.   ENT: No discharge. Pharynx clear.   Neck: No JVD.  Trachea midline.  Resp: No accessory muscle use. No crackles. No wheezes. No rhonchi.   CV: Regular rate. Regular rhythm. No murmur.  No rub. No edema.   Capillary Refill: Brisk,< 3 seconds   GI: Non-tender. Non-distended.  Normal bowel sounds.  Skin: Warm and dry. No nodule on exposed extremities. No rash on exposed extremities.   M/S: No  changes are seen in   the thalami and basal ganglia. Please correlate MR brain examination.   2. Global parenchymal volume loss with nonspecific white matter changes   likely related to chronic microvascular ischemic change.   3. No significant vascular abnormality in the head or neck.   4. 40% stenosis in the proximal right ICA by NASCET criteria.   5. 20% stenosis in the proximal left ICA due to calcific atherosclerosis.            ECHO    Left Ventricle: Technically difficult study.  There is poor endomyocardial border definition but globally LV function appears normal with a visually estimated EF of 55 - 60%. Left ventricle size is normal. Mildly increased wall thickness. No obvious regional wall motion abnormalities within the limits of this exam. Grade I diastolic dysfunction with normal LAP.    Right Ventricle: Not well visualized. Unable to assess systolic function.    Mitral Valve: Mild-moderate annular calcification at the posterior leaflet.  Individual leaflets not well-visualized. Mild regurgitation. No stenosis noted.    Aortic Valve: Not well visualized. No regurgitation. No stenosis by Doppler criteria.    Tricuspid Valve: Not well visualized. Trace regurgitation.    Pulmonic Valve: The pulmonic valve was not well visualized.    Image quality is technically difficult. Pt confused and laying towards the right side.         Assessment/Plan:    Syncope and Collapse  New onset left-sided weakness  Hx of Seizure, concern for seizure   - CT head and CTA of head and neck showed no intracranial hemorrhage. Does show some encephalomalacia changes in the thalami and basal ganglia. MRI ordered. Likely  microvascular ischemic changes. 40% of the right ICA and 20% stenosis of the left ICA  - neurology consulted  - not on any seizure regimen prior to admit   - MRI of brain ordered, cannot have done here due to pacemaker.  - echo ordered- done- EF 55 to 60 %, G1 DD  - seizure precautions ordered  - monitor on

## 2025-01-07 VITALS
BODY MASS INDEX: 29.34 KG/M2 | WEIGHT: 165.6 LBS | TEMPERATURE: 32 F | RESPIRATION RATE: 16 BRPM | HEIGHT: 63 IN | DIASTOLIC BLOOD PRESSURE: 81 MMHG | SYSTOLIC BLOOD PRESSURE: 166 MMHG | HEART RATE: 81 BPM | OXYGEN SATURATION: 94 %

## 2025-01-07 LAB
ALBUMIN SERPL-MCNC: 3.4 G/DL (ref 3.4–5)
ANION GAP SERPL CALCULATED.3IONS-SCNC: 10 MMOL/L (ref 3–16)
BUN SERPL-MCNC: 23 MG/DL (ref 7–20)
CALCIUM SERPL-MCNC: 8.4 MG/DL (ref 8.3–10.6)
CHLORIDE SERPL-SCNC: 109 MMOL/L (ref 99–110)
CO2 SERPL-SCNC: 25 MMOL/L (ref 21–32)
CREAT SERPL-MCNC: 2 MG/DL (ref 0.6–1.2)
GFR SERPLBLD CREATININE-BSD FMLA CKD-EPI: 26 ML/MIN/{1.73_M2}
GLUCOSE BLD-MCNC: 123 MG/DL (ref 70–99)
GLUCOSE BLD-MCNC: 155 MG/DL (ref 70–99)
GLUCOSE SERPL-MCNC: 145 MG/DL (ref 70–99)
PERFORMED ON: ABNORMAL
PERFORMED ON: ABNORMAL
PHOSPHATE SERPL-MCNC: 2.9 MG/DL (ref 2.5–4.9)
POTASSIUM SERPL-SCNC: 4.6 MMOL/L (ref 3.5–5.1)
SODIUM SERPL-SCNC: 144 MMOL/L (ref 136–145)

## 2025-01-07 PROCEDURE — 51798 US URINE CAPACITY MEASURE: CPT

## 2025-01-07 PROCEDURE — 2580000003 HC RX 258: Performed by: INTERNAL MEDICINE

## 2025-01-07 PROCEDURE — 51702 INSERT TEMP BLADDER CATH: CPT

## 2025-01-07 PROCEDURE — 6370000000 HC RX 637 (ALT 250 FOR IP): Performed by: STUDENT IN AN ORGANIZED HEALTH CARE EDUCATION/TRAINING PROGRAM

## 2025-01-07 PROCEDURE — 99238 HOSP IP/OBS DSCHRG MGMT 30/<: CPT | Performed by: INTERNAL MEDICINE

## 2025-01-07 PROCEDURE — 36415 COLL VENOUS BLD VENIPUNCTURE: CPT

## 2025-01-07 PROCEDURE — 80069 RENAL FUNCTION PANEL: CPT

## 2025-01-07 PROCEDURE — 6370000000 HC RX 637 (ALT 250 FOR IP): Performed by: INTERNAL MEDICINE

## 2025-01-07 PROCEDURE — 6370000000 HC RX 637 (ALT 250 FOR IP): Performed by: PSYCHIATRY & NEUROLOGY

## 2025-01-07 RX ORDER — OLANZAPINE 5 MG/1
5 TABLET, ORALLY DISINTEGRATING ORAL NIGHTLY PRN
Qty: 30 TABLET | Refills: 0
Start: 2025-01-07

## 2025-01-07 RX ORDER — TRAMADOL HYDROCHLORIDE 50 MG/1
50 TABLET ORAL EVERY 6 HOURS PRN
Qty: 12 TABLET | Refills: 0 | Status: SHIPPED | OUTPATIENT
Start: 2025-01-07 | End: 2025-01-10

## 2025-01-07 RX ORDER — SODIUM CHLORIDE 450 MG/100ML
INJECTION, SOLUTION INTRAVENOUS CONTINUOUS
Status: DISCONTINUED | OUTPATIENT
Start: 2025-01-07 | End: 2025-01-07 | Stop reason: HOSPADM

## 2025-01-07 RX ORDER — LACOSAMIDE 100 MG/1
100 TABLET ORAL 2 TIMES DAILY
Qty: 60 TABLET | Refills: 0 | Status: SHIPPED | OUTPATIENT
Start: 2025-01-07 | End: 2025-02-06

## 2025-01-07 RX ADMIN — CARVEDILOL 25 MG: 25 TABLET, FILM COATED ORAL at 09:50

## 2025-01-07 RX ADMIN — HYDRALAZINE HYDROCHLORIDE 25 MG: 25 TABLET ORAL at 15:02

## 2025-01-07 RX ADMIN — FLUOXETINE HYDROCHLORIDE 10 MG: 10 CAPSULE ORAL at 15:02

## 2025-01-07 RX ADMIN — AMLODIPINE BESYLATE 10 MG: 5 TABLET ORAL at 09:49

## 2025-01-07 RX ADMIN — BUSPIRONE HYDROCHLORIDE 10 MG: 10 TABLET ORAL at 15:02

## 2025-01-07 RX ADMIN — HYDRALAZINE HYDROCHLORIDE 25 MG: 25 TABLET ORAL at 05:25

## 2025-01-07 RX ADMIN — APIXABAN 5 MG: 5 TABLET, FILM COATED ORAL at 09:49

## 2025-01-07 RX ADMIN — SODIUM CHLORIDE, SODIUM LACTATE, POTASSIUM CHLORIDE, AND CALCIUM CHLORIDE: .6; .31; .03; .02 INJECTION, SOLUTION INTRAVENOUS at 06:33

## 2025-01-07 RX ADMIN — BUSPIRONE HYDROCHLORIDE 10 MG: 10 TABLET ORAL at 09:49

## 2025-01-07 RX ADMIN — PANTOPRAZOLE SODIUM 40 MG: 40 TABLET, DELAYED RELEASE ORAL at 05:25

## 2025-01-07 RX ADMIN — AMOXICILLIN AND CLAVULANATE POTASSIUM 1 TABLET: 500; 125 TABLET, FILM COATED ORAL at 09:49

## 2025-01-07 RX ADMIN — LACOSAMIDE 100 MG: 50 TABLET, FILM COATED ORAL at 09:50

## 2025-01-07 NOTE — DISCHARGE INSTR - COC
Continuity of Care Form    Patient Name: Rosaura Viveros   :  1950  MRN:  [id number]    Admit date:  2024  Discharge date:  25    Code Status Order: DNR-CC   Advance Directives:   Advance Care Flowsheet Documentation             Admitting Physician:  Leon Walsh MD  PCP: John Rose MD    Discharging Nurse: Bryan  [facility] Unit/Room#: 0211/0211-02  Discharging Unit Phone Number: [phone]    Emergency Contact:   Extended Emergency Contact Information  Primary Emergency Contact: Mariaa Simms   [facility]  Home Phone: [phone]  Relation: Healthcare Decision Maker  Secondary Emergency Contact: Neeraj Viveros  Home Phone: [phone]  Relation: Spouse    Past Surgical History:  Past Surgical History:   Procedure Laterality Date    CHOLECYSTECTOMY         Immunization History:   Immunization History   Administered Date(s) Administered    COVID-19, [place] Vaccine, Vaccine Unspecified 2022    Influenza 2013    Influenza Virus Vaccine 10/01/2011, 2014    Influenza, FLUZONE High Dose, (age 65 y+), IM, Trivalent PF, 0.5mL 2015, 10/28/2016, 2017    Pneumococcal Conjugate 7-valent (Prevnar7) 2007    Pneumococcal, PCV-13, PREVNAR 13, (age 6w+), IM, 0.5mL 2016    Tetanus 1995    Zoster Live (Zostavax) 2012       Active Problems:  Patient Active Problem List   Diagnosis Code    Type 2 diabetes mellitus ([facility]) E11.9    Anxiety F41.9    Depression F32.A    Fibromyalgia M79.7    Restless legs syndrome G25.81    Narcolepsy without cataplexy G47.419    Migraines G43.909    Gastritis K29.70    Patient overweight E66.3    Amenorrhea N91.2    Uncontrolled hypertension I10    Carpal tunnel syndrome of right wrist G56.01    Dementia ([facility]) F03.90    Type 2 diabetes mellitus with diabetic neuropathy ([facility]) E11.40    CHF (congestive heart failure), NYHA class I, acute on chronic, combined ([facility]) I50.43    Acute congestive heart  failure ([facility]) I50.9    Coronary artery disease involving native coronary artery of native heart without angina pectoris I25.10    PAF (paroxysmal atrial fibrillation) ([facility]) I48.0    Stage 4 chronic kidney disease ([facility]) N18.4    PVC's (premature ventricular contractions) I49.3    Pacemaker Z95.0    Stroke-like symptoms R29.90    Pre-syncope R55    Left-sided weakness R53.1    Syncope and collapse R55    Hx of seizure disorder Z86.69    THELMA (acute kidney injury) ([facility]) N17.9    Acute cystitis with hematuria N30.01       Isolation/Infection:   Isolation            No Isolation          Patient Infection Status       None to display                     Nurse Assessment:  Last Vital Signs: BP (!) 166/81   Pulse 81   Temp 98.3 °F (36.8 °C) (Oral)   Resp 16   Ht 1.6 m (5' 3\")   Wt 75.1 kg (165 lb 9.6 oz)   SpO2 94%   BMI 29.33 kg/m²     Last documented pain score (0-10 scale): Pain Level: 0  Last Weight:   Wt Readings from Last 1 Encounters:   01/07/25 75.1 kg (165 lb 9.6 oz)     Mental Status:  disoriented and alert    IV Access:  - None    Nursing Mobility/ADLs:  Walking   Dependent  Transfer  Dependent  Bathing  Dependent  Dressing  Assisted  Toileting  Dependent  Feeding  Independent  Med Admin  Assisted  Med Delivery   whole    Wound Care Documentation and Therapy:        Elimination:  Continence:   Bowel: Yes  Bladder: No  Urinary Catheter: Insertion Date: 1/7/25    Colostomy/Ileostomy/Ileal Conduit: No       Date of Last BM: 1/5/25    Intake/Output Summary (Last 24 hours) at 1/7/2025 1209  Last data filed at 1/7/2025 0832  Gross per 24 hour   Intake --   Output 1000 ml   Net -1000 ml     I/O last 3 completed shifts:  In: -   Out: 400 [Urine:400]    Safety Concerns:     At Risk for Falls    Impairments/Disabilities:      Speech    Nutrition Therapy:  Current Nutrition Therapy:   - Oral Diet:  Carb Control 4 carbs/meal (1800kcals/day)    Routes of Feeding: Oral  Liquids: Thin Liquids  Daily Fluid Restriction:  no  Last Modified Barium Swallow with Video (Video Swallowing Test): not done    Treatments at the Time of Hospital Discharge:   Respiratory Treatments:   Oxygen Therapy:  is not on home oxygen therapy.  Ventilator:    - No ventilator support    Rehab Therapies: Physical Therapy and Occupational Therapy  Weight Bearing Status/Restrictions: No weight bearing restrictions  Other Medical Equipment (for information only, NOT a DME order):  hospital bed  Other Treatments:     Patient's personal belongings (please select all that are sent with patient):  None    RN SIGNATURE:  Electronically signed by Bryan Mcdaniel RN on 1/7/25 at 2:45 PM EST    CASE MANAGEMENT/SOCIAL WORK SECTION    Inpatient Status Date: ***    Readmission Risk Assessment Score:  [facility] RISK OF UNPLANNED READMISSION 2.0             16.2 Total Score        Discharging to Facility/ Agency   Name:   Address:  Phone:  Fax:    Dialysis Facility (if applicable)   Name:  Address:  Dialysis Schedule:  Phone:  Fax:    / signature: {Esignature:[id number]}    PHYSICIAN SECTION    Prognosis: Fair    Condition at Discharge: Stable    Rehab Potential (if transferring to Rehab): Fair    Recommended Labs or Other Treatments After Discharge: Long term care    Physician Certification: I certify the above information and transfer of Rosaura Viveros  is necessary for the continuing treatment of the diagnosis listed and that she requires Skilled Nursing Facility for greater 30 days.     Update Admission H&P: No change in H&P    PHYSICIAN SIGNATURE:  Electronically signed by JOSE DACOSTA MD on 1/7/25 at 12:10 PM EST

## 2025-01-07 NOTE — DISCHARGE INSTRUCTIONS
Patient coming with a colvin catheter. Patient to have a voiding trail in three days. Patient to follow up with urology.

## 2025-01-07 NOTE — PROGRESS NOTES
Secure message sent to covering provider regarding pt not voiding. This RN concerned that this is her third day not voiding on her own and if we should place a colvin instead. Per MD we are to not straight cath the pt at this time and a new consult was placed for nephrology.

## 2025-01-07 NOTE — CONSULTS
Patient seen and examined, consult note dictated.    Assessment and Plan:    1- A/CKD: The patient has chronic kidney disease with a baseline creatinine of 1.4 to 1.6 mg/dl. Her THELMA is likely secondary to a pre-renal component, although a non oliguric ATN injury cannot be ruled out at this time. Her urinary output is well maintained and her serum creatinine is slowly trending down.  - Continue volume expansion.  - Avoid all nephrotoxic agents at this time.  - Maintain systolic blood pressure > 120 mmHg.    2- No significant electrolytes disorders noted.    3- HTN: Blood pressure within acceptable range.    4- Anemia: Stable hemoglobin, monitor.    5- Syncope/stroke-like symptoms: Management per primary team and Neurology.

## 2025-01-07 NOTE — DISCHARGE SUMMARY
Name:  Rosaura Viveros  Room:  0211/0211-02  MRN:    [id number]    Discharge Summary      This discharge summary is in conjunction with a complete physical exam done on the day of discharge.    Discharging Physician: Dr. Corley      Admit: 12/31/2024  Discharge:  1/7/2025    HPI taken from admission H&P:    Rosaura Viveros is a 74 y.o. female with chronic systolic heart failure, hypertension, hyperlipidemia, T2DM with long-term use of insulin, CKD stage III, Lewy body dementia, mood disorder, seizure disorder, atrial fibrillation presented from nursing home after a syncopal episode.  Apparently EMS reported patient's blood pressure was in 70s although on arrival in ER patient had a normal systolic blood pressure.  Initially evaluated by ER provider noted left-sided upper and lower extremity weakness, stroke alert was not initiated due to unknown LKN that patient being on Eliquis.  ER provider confirmed with nursing home about left-sided weakness and they confirmed that this is not baseline.  During my evaluation patient was alert oriented x 2 unable to engage in a meaningful conversation only mentions left shoulder pain on movement.        Diagnoses this Admission and Hospital Course     Syncope and Collapse  New onset left-sided weakness  Hx of Seizure, concern for seizure   - CT head and CTA of head and neck showed no intracranial hemorrhage. Does show some encephalomalacia changes in the thalami and basal ganglia. MRI ordered. Likely  microvascular ischemic changes. 40% of the right ICA and 20% stenosis of the left ICA  - neurology consulted  - not on any seizure regimen prior to admit   - MRI of brain ordered, cannot have done here due to pacemaker.  - echo ordered- done- EF 55 to 60 %, G1 DD  - seizure precautions ordered  - monitor on telemetry   - x-ray of left shoulder showed no acute finding   - on eliquis and statin   - per neuro, no need for ASA, will discontinue.   -started on Vimpat per neurology.     ->  as: COREG  Take 1 tablet by mouth 2 times daily (with meals)     Exenatide 2 MG Pen     FLUoxetine 10 MG capsule  Commonly known as: PROZAC     furosemide 40 MG tablet  Commonly known as: LASIX     Gemtesa 75 MG Tabs tablet  Generic drug: vibegron     glucagon 1 MG injection     guaiFENesin 600 MG extended release tablet  Commonly known as: MUCINEX     hydrALAZINE 25 MG tablet  Commonly known as: APRESOLINE  Take 1 tablet by mouth every 8 hours     insulin aspart 100 UNIT/ML injection pen  Commonly known as: NovoLOG     irbesartan 300 MG tablet  Commonly known as: Avapro  Take 1 tablet by mouth daily     latanoprost 0.005 % ophthalmic solution  Commonly known as: XALATAN     loratadine 10 MG tablet  Commonly known as: CLARITIN     melatonin 3 MG Tabs tablet     omeprazole 40 MG delayed release capsule  Commonly known as: PRILOSEC  Take 1 capsule by mouth daily     rOPINIRole 0.5 MG tablet  Commonly known as: REQUIP  Take 1 tablet by mouth nightly     Toujeo SoloStar 300 UNIT/ML concentrated injection pen  Generic drug: insulin glargine (1 unit dial)     traMADol 50 MG tablet  Commonly known as: ULTRAM  Take 1 tablet by mouth every 6 hours as needed for Pain for up to 3 days. Max Daily Amount: 200 mg     vitamin B-12 500 MCG tablet  Commonly known as: CYANOCOBALAMIN     Vitamin D3 1.25 MG (81764 UT) Caps               Where to Get Your Medications        You can get these medications from any pharmacy    Bring a paper prescription for each of these medications  lacosamide 100 MG Tabs tablet  traMADol 50 MG tablet       Information about where to get these medications is not yet available    Ask your nurse or doctor about these medications  OLANZapine zydis 5 MG disintegrating tablet           Discharged in stable condition back to [facility]     Follow Up:  Follow up with physician at [facility]        JOSE DACOSTA MD 1/16/2025 9:15 AM

## 2025-01-07 NOTE — DISCHARGE INSTR - DIET

## 2025-01-07 NOTE — PLAN OF CARE
Problem: Safety - Adult  Goal: Free from fall injury  Outcome: Adequate for Discharge     Problem: Chronic Conditions and Co-morbidities  Goal: Patient's chronic conditions and co-morbidity symptoms are monitored and maintained or improved  Outcome: Adequate for Discharge     Problem: Discharge Planning  Goal: Discharge to home or other facility with appropriate resources  Outcome: Adequate for Discharge     Problem: Pain  Goal: Verbalizes/displays adequate comfort level or baseline comfort level  Outcome: Adequate for Discharge     Problem: ABCDS Injury Assessment  Goal: Absence of physical injury  Outcome: Adequate for Discharge     Problem: Skin/Tissue Integrity  Goal: Absence of new skin breakdown  Description: 1.  Monitor for areas of redness and/or skin breakdown  2.  Assess vascular access sites hourly  3.  Every 4-6 hours minimum:  Change oxygen saturation probe site  4.  Every 4-6 hours:  If on nasal continuous positive airway pressure, respiratory therapy assess nares and determine need for appliance change or resting period.  Outcome: Adequate for Discharge     Problem: Safety - Medical Restraint  Goal: Remains free of injury from restraints (Restraint for Interference with Medical Device)  Description: INTERVENTIONS:  1. Determine that other, less restrictive measures have been tried or would not be effective before applying the restraint  2. Evaluate the patient's condition at the time of restraint application  3. Inform patient/family regarding the reason for restraint  4. Q2H: Monitor safety, psychosocial status, comfort, nutrition and hydration  Outcome: Adequate for Discharge     Problem: Neurosensory - Adult  Goal: Achieves stable or improved neurological status  Outcome: Adequate for Discharge

## 2025-01-07 NOTE — CONSULTS
[address] [address]                              CONSULTATION      PATIENT NAME: CEDRICK LOPEZ                : 1950  MED REC NO: [id number]                      ROOM: 0211  ACCOUNT NO: [id number]                       ADMIT DATE: 2024  PROVIDER: Braeden Guerrero MD      CONSULT DATE: 2025    REASON FOR CONSULTATION:  Acute on chronic kidney disease.    HISTORY OF PRESENT ILLNESS:  The patient is a 74-year-old  female patient with a past medical history significant for chronic kidney disease and a baseline creatinine ranging between 1.4 to 1.6 mg/dL.  The patient presented to [facility] with a syncope episode.  She also noticed a new-onset left-sided weakness.  Upon presentation, she was noted to have an acute on chronic kidney injury with a serum creatinine of 2.5 mg/dL, which prompted Nephrology consultation.    PAST MEDICAL HISTORY:    1. Chronic kidney disease.  2. Diastolic heart failure.  3. Dementia.  4. Depression.  5. Fibromyalgia.  6. Hypertension.  7. Migraine headaches.  8. Restless legs syndrome.  9. Type 2 diabetes mellitus.  10. Transient ischemic attack.    PAST SURGICAL HISTORY:  Cholecystectomy.    ALLERGIES:  THE PATIENT IS ALLERGIC TO ASPARTAME, CODEINE, ERYTHROMYCIN, AND LISINOPRIL.      SOCIAL HISTORY:  The patient does not smoke or drink alcohol.    FAMILY HISTORY:  Significant for hypertension in her mother.    REVIEW OF SYSTEMS:  The patient denied any nausea, vomiting, or abdominal pain.  Otherwise, a 10-point review of systems was relatively unremarkable.    PHYSICAL EXAMINATION:  VITAL SIGNS:  Blood pressure 153/86, heart rate 70, respirations 16, temperature 97.5 Fahrenheit.  The patient is saturating 94% on room air.  GENERAL APPEARANCE:  The patient is alert, oriented x3, not in acute distress.  EYES:  Reveal normal conjunctivae, reactive pupils.    NECK:

## 2025-01-07 NOTE — CARE COORDINATION
DISCHARGE ORDER  Date/Time 2025 1:21 PM  Completed by: Ave Tracey, RN, Case Management    Patient Name: Rosaura Viveros    : 1950      Admit order Date and Status:25 inpt  Noted discharge order. (verify MD's last order for status of admission/Traditional Medicare 3 [place] Inpatient qualifying stay required for SNF)    Confirmed discharge plan with:              Patient:  Yes              When pt confirms DC plan does any support person need to be contacted by CM Yes if yes who chiquitamati Leroy                      Discharge to Facility: [facility]   Facility phone number for staff giving report: [phone]   Pre-certification completed: N/A   Hospital Exemption Notification (HENS) completed: N/A-LTC   Discharge orders and Continuity of Care faxed to facility:  facility will pull from [facility]      Transportation:               Medical Transport explained with choice list offered to pt/family.                Choice:(no preference)  Agency used: [employer]   time:   14:30      Pt/family/Nursing/Facility aware of  time:   Yes Names: Carmencita GIBBS, Galina charge, Bryan nurse, pt, pt chiquita and Leonel at [facility]  Ambulance form completed:  Yes:      Date Last IMM Given: 25    Comments:Order for dc noted. Spoke with pt chiquita as pt has dementia and plan remains return to [facility]. Spoke with Leonel at [facility] who can accept today. Chart reviewed and no dc needs identified.    Pt is being d/c'd to home today. Pt's O2 sats are 94% on RA.    Discharge timeout done with nsg, CM and pt. All discharge needs and concerns addressed.    Discharging nurse to complete LEATHA, reconcile AVS, and place final copy with patient's discharge packet. Discharging RN to ensure that written prescriptions for  Level II medications are sent with patient to the facility as per protocol.

## 2025-01-07 NOTE — FLOWSHEET NOTE
01/06/25 2045   Vital Signs   Temp 98.1 °F (36.7 °C)   Temp Source Oral   Pulse 78   Heart Rate Source Monitor   Respirations 20   BP (!) 158/64   MAP (Calculated) 95   BP Location Left upper arm   BP Method Manual   Patient Position Lying right side     Care assumed for 7pm-7am shift. Pt lying in bed on right side denying pain she states that she is cold. IV infusing per order with no signs of occlusion. Pt able to swallow some medications but did spit some out, so this RN unable to verify what she did or did not take. VS obtained and assessment performed. Pt given opportunity to ask questions, POC reviewed and all needs addressed at this time. Call light within reach and bed locked and low. TSM remains in place for pts safety.

## 2025-01-07 NOTE — PROGRESS NOTES
Patient to go back to nursing home with a colvin catheter. A void trial in three days of return to see if retention is better. Follow up with urology.

## 2025-01-07 NOTE — PROGRESS NOTES
Patient having retention. Writer bladder scanned patient showing 879 ml. Provider notified and colvin catheter ordered. When placed immediately 1000 ml was released.

## 2025-01-12 ENCOUNTER — APPOINTMENT (OUTPATIENT)
Dept: GENERAL RADIOLOGY | Age: 75
End: 2025-01-12
Payer: MEDICARE

## 2025-01-12 ENCOUNTER — HOSPITAL ENCOUNTER (EMERGENCY)
Age: 75
Discharge: HOME | End: 2025-01-12
Attending: EMERGENCY MEDICINE
Payer: MEDICARE

## 2025-01-12 VITALS
OXYGEN SATURATION: 96 % | HEIGHT: 62 IN | RESPIRATION RATE: 18 BRPM | BODY MASS INDEX: 30.29 KG/M2 | SYSTOLIC BLOOD PRESSURE: 139 MMHG | DIASTOLIC BLOOD PRESSURE: 65 MMHG | HEART RATE: 70 BPM | TEMPERATURE: 98.2 F

## 2025-01-12 DIAGNOSIS — U07.1 COVID-19: ICD-10-CM

## 2025-01-12 DIAGNOSIS — R53.1 GENERALIZED WEAKNESS: Primary | ICD-10-CM

## 2025-01-12 LAB
ALBUMIN SERPL-MCNC: 3.2 G/DL (ref 3.4–5)
ALBUMIN/GLOB SERPL: 1.5 {RATIO} (ref 1.1–2.2)
ALP SERPL-CCNC: 78 U/L (ref 40–129)
ALT SERPL-CCNC: 12 U/L (ref 10–40)
AMORPH SED URNS QL MICRO: ABNORMAL /HPF
ANION GAP SERPL CALCULATED.3IONS-SCNC: 11 MMOL/L (ref 3–16)
AST SERPL-CCNC: 19 U/L (ref 15–37)
BASOPHILS # BLD: 0 K/UL (ref 0–0.2)
BASOPHILS NFR BLD: 0.4 %
BILIRUB SERPL-MCNC: <0.2 MG/DL (ref 0–1)
BILIRUB UR QL STRIP.AUTO: NEGATIVE
BUN SERPL-MCNC: 37 MG/DL (ref 7–20)
CALCIUM SERPL-MCNC: 8 MG/DL (ref 8.3–10.6)
CHLORIDE SERPL-SCNC: 104 MMOL/L (ref 99–110)
CLARITY UR: CLEAR
CO2 SERPL-SCNC: 26 MMOL/L (ref 21–32)
COLOR UR: YELLOW
CREAT SERPL-MCNC: 2 MG/DL (ref 0.6–1.2)
DEPRECATED RDW RBC AUTO: 13.5 % (ref 12.4–15.4)
EKG ATRIAL RATE: 69 BPM
EKG DIAGNOSIS: NORMAL
EKG P AXIS: 44 DEGREES
EKG P-R INTERVAL: 158 MS
EKG Q-T INTERVAL: 416 MS
EKG QRS DURATION: 82 MS
EKG QTC CALCULATION (BAZETT): 445 MS
EKG R AXIS: 18 DEGREES
EKG T AXIS: 83 DEGREES
EKG VENTRICULAR RATE: 69 BPM
EOSINOPHIL # BLD: 0.1 K/UL (ref 0–0.6)
EOSINOPHIL NFR BLD: 2.1 %
EPI CELLS #/AREA URNS HPF: ABNORMAL /HPF (ref 0–5)
FLUAV RNA RESP QL NAA+PROBE: NOT DETECTED
FLUBV RNA RESP QL NAA+PROBE: NOT DETECTED
GFR SERPLBLD CREATININE-BSD FMLA CKD-EPI: 26 ML/MIN/{1.73_M2}
GLUCOSE SERPL-MCNC: 189 MG/DL (ref 70–99)
GLUCOSE UR STRIP.AUTO-MCNC: 100 MG/DL
HCT VFR BLD AUTO: 28.5 % (ref 36–48)
HGB BLD-MCNC: 9.6 G/DL (ref 12–16)
HGB UR QL STRIP.AUTO: ABNORMAL
KETONES UR STRIP.AUTO-MCNC: NEGATIVE MG/DL
LEUKOCYTE ESTERASE UR QL STRIP.AUTO: NEGATIVE
LYMPHOCYTES # BLD: 0.6 K/UL (ref 1–5.1)
LYMPHOCYTES NFR BLD: 11.2 %
MCH RBC QN AUTO: 29.2 PG (ref 26–34)
MCHC RBC AUTO-ENTMCNC: 33.6 G/DL (ref 31–36)
MCV RBC AUTO: 86.7 FL (ref 80–100)
MONOCYTES # BLD: 0.6 K/UL (ref 0–1.3)
MONOCYTES NFR BLD: 9.7 %
MUCOUS THREADS #/AREA URNS LPF: ABNORMAL /LPF
NEUTROPHILS # BLD: 4.4 K/UL (ref 1.7–7.7)
NEUTROPHILS NFR BLD: 76.6 %
NITRITE UR QL STRIP.AUTO: NEGATIVE
PH UR STRIP.AUTO: 6 [PH] (ref 5–8)
PLATELET # BLD AUTO: 287 K/UL (ref 135–450)
PMV BLD AUTO: 7.8 FL (ref 5–10.5)
POTASSIUM SERPL-SCNC: 4.5 MMOL/L (ref 3.5–5.1)
PROT SERPL-MCNC: 5.4 G/DL (ref 6.4–8.2)
PROT UR STRIP.AUTO-MCNC: >=300 MG/DL
RBC # BLD AUTO: 3.28 M/UL (ref 4–5.2)
RBC #/AREA URNS HPF: ABNORMAL /HPF (ref 0–4)
SARS-COV-2 RNA RESP QL NAA+PROBE: DETECTED
SODIUM SERPL-SCNC: 141 MMOL/L (ref 136–145)
SP GR UR STRIP.AUTO: 1.02 (ref 1–1.03)
TROPONIN, HIGH SENSITIVITY: 32 NG/L (ref 0–14)
TROPONIN, HIGH SENSITIVITY: 35 NG/L (ref 0–14)
UA DIPSTICK W REFLEX MICRO PNL UR: YES
URN SPEC COLLECT METH UR: ABNORMAL
UROBILINOGEN UR STRIP-ACNC: 0.2 E.U./DL
WBC # BLD AUTO: 5.7 K/UL (ref 4–11)
WBC #/AREA URNS HPF: ABNORMAL /HPF (ref 0–5)

## 2025-01-12 PROCEDURE — 71045 X-RAY EXAM CHEST 1 VIEW: CPT

## 2025-01-12 PROCEDURE — 36415 COLL VENOUS BLD VENIPUNCTURE: CPT

## 2025-01-12 PROCEDURE — 84484 ASSAY OF TROPONIN QUANT: CPT

## 2025-01-12 PROCEDURE — 81001 URINALYSIS AUTO W/SCOPE: CPT

## 2025-01-12 PROCEDURE — 99285 EMERGENCY DEPT VISIT HI MDM: CPT

## 2025-01-12 PROCEDURE — 93005 ELECTROCARDIOGRAM TRACING: CPT | Performed by: EMERGENCY MEDICINE

## 2025-01-12 PROCEDURE — 93010 ELECTROCARDIOGRAM REPORT: CPT | Performed by: INTERNAL MEDICINE

## 2025-01-12 PROCEDURE — 6370000000 HC RX 637 (ALT 250 FOR IP): Performed by: EMERGENCY MEDICINE

## 2025-01-12 PROCEDURE — 2580000003 HC RX 258: Performed by: EMERGENCY MEDICINE

## 2025-01-12 PROCEDURE — 87636 SARSCOV2 & INF A&B AMP PRB: CPT

## 2025-01-12 PROCEDURE — 85025 COMPLETE CBC W/AUTO DIFF WBC: CPT

## 2025-01-12 PROCEDURE — 80053 COMPREHEN METABOLIC PANEL: CPT

## 2025-01-12 RX ORDER — 0.9 % SODIUM CHLORIDE 0.9 %
1000 INTRAVENOUS SOLUTION INTRAVENOUS ONCE
Status: COMPLETED | OUTPATIENT
Start: 2025-01-12 | End: 2025-01-12

## 2025-01-12 RX ORDER — OLANZAPINE 5 MG/1
5 TABLET, ORALLY DISINTEGRATING ORAL ONCE
Status: COMPLETED | OUTPATIENT
Start: 2025-01-12 | End: 2025-01-12

## 2025-01-12 RX ADMIN — OLANZAPINE 5 MG: 5 TABLET, ORALLY DISINTEGRATING ORAL at 15:17

## 2025-01-12 RX ADMIN — SODIUM CHLORIDE 1000 ML: 9 INJECTION, SOLUTION INTRAVENOUS at 11:13

## 2025-01-12 ASSESSMENT — PAIN - FUNCTIONAL ASSESSMENT: PAIN_FUNCTIONAL_ASSESSMENT: NONE - DENIES PAIN

## 2025-01-12 NOTE — ED NOTES
Writer spoke with pt's daughter, who is POA for pt. Writer will call daughter with update to POC for pt. Pt resting in bed at this time, no needs, writer will continue to monitor.

## 2025-01-12 NOTE — ED PROVIDER NOTES
DETECTED   CBC with Auto Differential   Result Value Ref Range    WBC 5.7 4.0 - 11.0 K/uL    RBC 3.28 (L) 4.00 - 5.20 M/uL    Hemoglobin 9.6 (L) 12.0 - 16.0 g/dL    Hematocrit 28.5 (L) 36.0 - 48.0 %    MCV 86.7 80.0 - 100.0 fL    MCH 29.2 26.0 - 34.0 pg    MCHC 33.6 31.0 - 36.0 g/dL    RDW 13.5 12.4 - 15.4 %    Platelets 287 135 - 450 K/uL    MPV 7.8 5.0 - 10.5 fL    Neutrophils % 76.6 %    Lymphocytes % 11.2 %    Monocytes % 9.7 %    Eosinophils % 2.1 %    Basophils % 0.4 %    Neutrophils Absolute 4.4 1.7 - 7.7 K/uL    Lymphocytes Absolute 0.6 (L) 1.0 - 5.1 K/uL    Monocytes Absolute 0.6 0.0 - 1.3 K/uL    Eosinophils Absolute 0.1 0.0 - 0.6 K/uL    Basophils Absolute 0.0 0.0 - 0.2 K/uL   Comprehensive Metabolic Panel w/ Reflex to MG   Result Value Ref Range    Sodium 141 136 - 145 mmol/L    Potassium reflex Magnesium 4.5 3.5 - 5.1 mmol/L    Chloride 104 99 - 110 mmol/L    CO2 26 21 - 32 mmol/L    Anion Gap 11 3 - 16    Glucose 189 (H) 70 - 99 mg/dL    BUN 37 (H) 7 - 20 mg/dL    Creatinine 2.0 (H) 0.6 - 1.2 mg/dL    Est, Glom Filt Rate 26 (A) >60    Calcium 8.0 (L) 8.3 - 10.6 mg/dL    Total Protein 5.4 (L) 6.4 - 8.2 g/dL    Albumin 3.2 (L) 3.4 - 5.0 g/dL    Albumin/Globulin Ratio 1.5 1.1 - 2.2    Total Bilirubin <0.2 0.0 - 1.0 mg/dL    Alkaline Phosphatase 78 40 - 129 U/L    ALT 12 10 - 40 U/L    AST 19 15 - 37 U/L   Urinalysis   Result Value Ref Range    Color, UA Yellow Straw/Yellow    Clarity, UA Clear Clear    Glucose, Ur 100 (A) Negative mg/dL    Bilirubin, Urine Negative Negative    Ketones, Urine Negative Negative mg/dL    Specific Gravity, UA 1.025 1.005 - 1.030    Blood, Urine LARGE (A) Negative    pH, Urine 6.0 5.0 - 8.0    Protein, UA >=300 (A) Negative mg/dL    Urobilinogen, Urine 0.2 <2.0 E.U./dL    Nitrite, Urine Negative Negative    Leukocyte Esterase, Urine Negative Negative    Microscopic Examination YES     Urine Type NotGiven    Troponin   Result Value Ref Range    Troponin, High Sensitivity 35 (H)

## 2025-01-12 NOTE — ED NOTES
1517 -- pt begins to be agitated, yelling for help or for her daughter. Pt has LB dementia, per daughter pt usually begins to sundown/become agitated around 1500 each day. Writer asked for medication order of Zyprexa from pt's home med list and medicated pt. Pt continues to yell for daughter, writer and Lo BROWN taking turns reassuring pt that she is safe and her transport home will arrive soon.

## 2025-01-12 NOTE — ED NOTES
Spoke with daughter to provide update that pt can return to [facility]. Transport request in progress.

## 2025-01-12 NOTE — DISCHARGE INSTRUCTIONS
You have COVID-19.  The remainder of your workup was reassuring.  Rest, drink plenty of fluids to stay hydrated.  Alternate Tylenol and ibuprofen for body aches and fevers.

## 2025-01-13 NOTE — PROGRESS NOTES
Physician Progress Note      PATIENT:               CEDRICK LOPEZ  CSN #:                  [id number]  :                       1950  ADMIT DATE:       2024 9:17 PM  DISCH DATE:        2025 3:19 PM  RESPONDING  PROVIDER #:        Braeden Guerrero MD          QUERY TEXT:    Patient with documentation of elevated creatinine on CKD, baseline creatinine   appears to be 1.4 - creatinine on admission 1.8. it is 2.5 today.  Nephrology   consult note on  states,  \"Her acute kidney injury is likely secondary to a   prerenal component, although a nonoliguric acute tubular necrosis injury   cannot be ruled out at this time.  Her urine output is well maintained, and   her serum creatinine is slowly trending down.\" After study, please further   clarify:    The medical record reflects the following:  Risk Factors: age, CKD, CHF, htn, DM  Clinical Indicators:  Nephrology consult note on  states, \"Her acute kidney   injury is likely secondary to a prerenal component, although a nonoliguric   acute tubular necrosis injury cannot be ruled out at this time.  Her urine   output is well maintained, and her serum creatinine is slowly trending down  Treatment: nephrology consult, trend labs, Continue volume expansion.    b.   Avoid all nephrotoxic agents at this time.    c. Maintain systolic blood   pressure above 120 mmHg.  Options provided:  -- Suspected ATN present as evidenced by, Please document evidence.  -- ATN was ruled out, patient with THELMA on CKD  -- Other - I will add my own diagnosis  -- Disagree - Not applicable / Not valid  -- Disagree - Clinically unable to determine / Unknown  -- Refer to Clinical Documentation Reviewer    PROVIDER RESPONSE TEXT:    ATN was ruled out after study, THELMA on CKD    Query created by: Gema Baker on 2025 5:43 AM      Electronically signed by:  Braeden Guerrero MD 2025 12:11 PM

## 2025-01-17 NOTE — PROGRESS NOTES
Physician Progress Note      PATIENT:               CEDRICK LOPEZ  CSN #:                  [id number]  :                       1950  ADMIT DATE:       2024 9:17 PM  DISCH DATE:        2025 3:19 PM  RESPONDING  PROVIDER #:        Bhumi Corley MD          QUERY TEXT:    Patient admitted with syncope and left sided weakness secondary to seizure.    The progress note on  states, \"Cannot rule out acute CVA, MRI of the brain   could not be done-continue anticoagulation with Eliquis; continue statins\".   Please clarify the following:    The medical record reflects the following:  Risk Factors: age seizure, ckd, DM2, htn, afib, Secondary hypercoagulable   state in a patient with atrial fibrillation  Clinical Indicators: annot rule out acute CVA,  MRI  of the brain could not be   done-continue anticoagulation with Eliquis; continue statins.  Treatment: neuro consult, echo, tele, eliquis, statin, monitoring  Options provided:  -- Suspected CVA was treated.  -- CVA ruled out  -- Other - I will add my own diagnosis  -- Disagree - Not applicable / Not valid  -- Disagree - Clinically unable to determine / Unknown  -- Refer to Clinical Documentation Reviewer    PROVIDER RESPONSE TEXT:    This patient has suspected CVA.    Query created by: Gema Baker on 2025 8:27 AM      Electronically signed by:  Bhumi Corley MD 2025 8:11 PM

## 2025-02-16 ENCOUNTER — ANESTHESIA EVENT (OUTPATIENT)
Dept: OPERATING ROOM | Age: 75
End: 2025-02-16
Payer: MEDICARE

## 2025-02-21 NOTE — PROGRESS NOTES

## 2025-03-02 NOTE — ANESTHESIA PRE PROCEDURE
Wheezing or Shortness of Breath 12/16/23   John Telles,    busPIRone (BUSPAR) 10 MG tablet Take 1 tablet by mouth 2 times daily 9/23/23   Christine Tesfaye APRN - CNP   hydrALAZINE (APRESOLINE) 25 MG tablet Take 1 tablet by mouth every 8 hours 9/23/23   Christine Tesfaye APRN - CNP   carvedilol (COREG) 25 MG tablet Take 1 tablet by mouth 2 times daily (with meals) 9/23/23   Christine Tesfaye APRN - CNP   rOPINIRole (REQUIP) 0.5 MG tablet Take 1 tablet by mouth nightly 9/23/23   Christine Tesfaye APRN - CNP   furosemide (LASIX) 40 MG tablet Take 1 tablet by mouth daily    Yobany Lambert MD   GEMTESA 75 MG TABS tablet Take 1 tablet by mouth daily 9/5/23   Yobany Lambert MD   Cholecalciferol (VITAMIN D3) 1.25 MG (99867 UT) CAPS Take 1 capsule by mouth once a week On fridays    Yobany Lambert MD   amLODIPine (NORVASC) 10 MG tablet Take 1 tablet by mouth every evening 3/27/19   Yobany Lambert MD   atorvastatin (LIPITOR) 80 MG tablet  2/8/23   Yobany Lambert MD   melatonin 3 MG TABS tablet Take 2 tablets by mouth    Yobany Lambert MD   insulin glargine, 1 unit dial, (TOUJEO SOLOSTAR) 300 UNIT/ML concentrated injection pen Inject 10 Units into the skin 2 times daily 12/27/21   Yobany Lambert MD   insulin aspart (NOVOLOG) 100 UNIT/ML injection pen Inject 5 Units into the skin 3 times daily 12/27/21   Yobany Lambert MD   apixaban (ELIQUIS) 5 MG TABS tablet Take 1 tablet by mouth 2 times daily 9/18/18   Yobany Lambert MD   irbesartan (AVAPRO) 300 MG tablet Take 1 tablet by mouth daily  Patient taking differently: Take 1 tablet by mouth at bedtime At bedtime hold for sbp<100 or LUDIN <60 or HR <60 12/14/17   Vania Mccallum APRN - CNP   omeprazole (PRILOSEC) 40 MG delayed release capsule Take 1 capsule by mouth daily 12/14/17   Vania Mccallum, APRN - CNP   Blood Glucose Monitoring Suppl (BLOOD GLUCOSE

## 2025-03-03 RX ORDER — LOSARTAN POTASSIUM 100 MG/1
100 TABLET ORAL DAILY
COMMUNITY

## 2025-03-03 RX ORDER — TRAMADOL HYDROCHLORIDE 50 MG/1
50 TABLET ORAL EVERY 6 HOURS PRN
COMMUNITY

## 2025-03-04 ENCOUNTER — ANESTHESIA (OUTPATIENT)
Dept: OPERATING ROOM | Age: 75
End: 2025-03-04
Payer: MEDICARE

## 2025-03-04 ENCOUNTER — HOSPITAL ENCOUNTER (OUTPATIENT)
Age: 75
Setting detail: OUTPATIENT SURGERY
Discharge: HOME | End: 2025-03-04
Attending: UROLOGY | Admitting: UROLOGY
Payer: MEDICARE

## 2025-03-04 VITALS
HEIGHT: 63 IN | SYSTOLIC BLOOD PRESSURE: 107 MMHG | WEIGHT: 167 LBS | HEART RATE: 70 BPM | RESPIRATION RATE: 12 BRPM | TEMPERATURE: 97.6 F | BODY MASS INDEX: 29.59 KG/M2 | DIASTOLIC BLOOD PRESSURE: 68 MMHG | OXYGEN SATURATION: 96 %

## 2025-03-04 DIAGNOSIS — R31.29 MICROSCOPIC HEMATURIA: ICD-10-CM

## 2025-03-04 LAB
ANION GAP SERPL CALCULATED.3IONS-SCNC: 10 MMOL/L (ref 3–16)
BUN SERPL-MCNC: 54 MG/DL (ref 7–20)
CALCIUM SERPL-MCNC: 8.3 MG/DL (ref 8.3–10.6)
CHLORIDE SERPL-SCNC: 102 MMOL/L (ref 99–110)
CO2 SERPL-SCNC: 26 MMOL/L (ref 21–32)
CREAT SERPL-MCNC: 3.1 MG/DL (ref 0.6–1.2)
DEPRECATED RDW RBC AUTO: 15 % (ref 12.4–15.4)
GFR SERPLBLD CREATININE-BSD FMLA CKD-EPI: 15 ML/MIN/{1.73_M2}
GLUCOSE SERPL-MCNC: 155 MG/DL (ref 70–99)
HCT VFR BLD AUTO: 29.9 % (ref 36–48)
HGB BLD-MCNC: 10 G/DL (ref 12–16)
MCH RBC QN AUTO: 29.5 PG (ref 26–34)
MCHC RBC AUTO-ENTMCNC: 33.6 G/DL (ref 31–36)
MCV RBC AUTO: 87.7 FL (ref 80–100)
PLATELET # BLD AUTO: 257 K/UL (ref 135–450)
PMV BLD AUTO: 8.4 FL (ref 5–10.5)
POTASSIUM SERPL-SCNC: 5.4 MMOL/L (ref 3.5–5.1)
RBC # BLD AUTO: 3.41 M/UL (ref 4–5.2)
SODIUM SERPL-SCNC: 138 MMOL/L (ref 136–145)
WBC # BLD AUTO: 7.2 K/UL (ref 4–11)

## 2025-03-04 PROCEDURE — 80048 BASIC METABOLIC PNL TOTAL CA: CPT

## 2025-03-04 PROCEDURE — 85027 COMPLETE CBC AUTOMATED: CPT

## 2025-03-04 PROCEDURE — 3600000004 HC SURGERY LEVEL 4 BASE: Performed by: UROLOGY

## 2025-03-04 PROCEDURE — 87077 CULTURE AEROBIC IDENTIFY: CPT

## 2025-03-04 PROCEDURE — 2580000003 HC RX 258: Performed by: NURSE ANESTHETIST, CERTIFIED REGISTERED

## 2025-03-04 PROCEDURE — 7100000011 HC PHASE II RECOVERY - ADDTL 15 MIN: Performed by: UROLOGY

## 2025-03-04 PROCEDURE — 3600000014 HC SURGERY LEVEL 4 ADDTL 15MIN: Performed by: UROLOGY

## 2025-03-04 PROCEDURE — 6360000002 HC RX W HCPCS: Performed by: NURSE ANESTHETIST, CERTIFIED REGISTERED

## 2025-03-04 PROCEDURE — 2500000003 HC RX 250 WO HCPCS: Performed by: ANESTHESIOLOGY

## 2025-03-04 PROCEDURE — 87086 URINE CULTURE/COLONY COUNT: CPT

## 2025-03-04 PROCEDURE — 2709999900 HC NON-CHARGEABLE SUPPLY: Performed by: UROLOGY

## 2025-03-04 PROCEDURE — 87186 SC STD MICRODIL/AGAR DIL: CPT

## 2025-03-04 PROCEDURE — 2580000003 HC RX 258: Performed by: ANESTHESIOLOGY

## 2025-03-04 PROCEDURE — 3700000001 HC ADD 15 MINUTES (ANESTHESIA): Performed by: UROLOGY

## 2025-03-04 PROCEDURE — 36415 COLL VENOUS BLD VENIPUNCTURE: CPT

## 2025-03-04 PROCEDURE — 3700000000 HC ANESTHESIA ATTENDED CARE: Performed by: UROLOGY

## 2025-03-04 PROCEDURE — 7100000010 HC PHASE II RECOVERY - FIRST 15 MIN: Performed by: UROLOGY

## 2025-03-04 PROCEDURE — 2500000003 HC RX 250 WO HCPCS: Performed by: UROLOGY

## 2025-03-04 RX ORDER — SODIUM CHLORIDE, SODIUM LACTATE, POTASSIUM CHLORIDE, CALCIUM CHLORIDE 600; 310; 30; 20 MG/100ML; MG/100ML; MG/100ML; MG/100ML
INJECTION, SOLUTION INTRAVENOUS
Status: DISCONTINUED | OUTPATIENT
Start: 2025-03-04 | End: 2025-03-04 | Stop reason: SDUPTHER

## 2025-03-04 RX ORDER — PROPOFOL 10 MG/ML
INJECTION, EMULSION INTRAVENOUS
Status: DISCONTINUED | OUTPATIENT
Start: 2025-03-04 | End: 2025-03-04 | Stop reason: SDUPTHER

## 2025-03-04 RX ORDER — SODIUM CHLORIDE, SODIUM LACTATE, POTASSIUM CHLORIDE, CALCIUM CHLORIDE 600; 310; 30; 20 MG/100ML; MG/100ML; MG/100ML; MG/100ML
INJECTION, SOLUTION INTRAVENOUS CONTINUOUS
Status: DISCONTINUED | OUTPATIENT
Start: 2025-03-04 | End: 2025-03-04 | Stop reason: HOSPADM

## 2025-03-04 RX ORDER — ONDANSETRON 2 MG/ML
4 INJECTION INTRAMUSCULAR; INTRAVENOUS
Status: DISCONTINUED | OUTPATIENT
Start: 2025-03-04 | End: 2025-03-04 | Stop reason: HOSPADM

## 2025-03-04 RX ORDER — OXYCODONE HYDROCHLORIDE 5 MG/1
10 TABLET ORAL PRN
Status: DISCONTINUED | OUTPATIENT
Start: 2025-03-04 | End: 2025-03-04 | Stop reason: HOSPADM

## 2025-03-04 RX ORDER — DIPHENHYDRAMINE HYDROCHLORIDE 50 MG/ML
12.5 INJECTION INTRAMUSCULAR; INTRAVENOUS
Status: DISCONTINUED | OUTPATIENT
Start: 2025-03-04 | End: 2025-03-04 | Stop reason: HOSPADM

## 2025-03-04 RX ORDER — LABETALOL HYDROCHLORIDE 5 MG/ML
5 INJECTION, SOLUTION INTRAVENOUS EVERY 10 MIN PRN
Status: DISCONTINUED | OUTPATIENT
Start: 2025-03-04 | End: 2025-03-04 | Stop reason: HOSPADM

## 2025-03-04 RX ORDER — SODIUM CHLORIDE 9 MG/ML
INJECTION, SOLUTION INTRAVENOUS PRN
Status: DISCONTINUED | OUTPATIENT
Start: 2025-03-04 | End: 2025-03-04 | Stop reason: HOSPADM

## 2025-03-04 RX ORDER — CEFUROXIME AXETIL 250 MG/1
250 TABLET ORAL 2 TIMES DAILY
Qty: 14 TABLET | Refills: 0 | Status: SHIPPED | OUTPATIENT
Start: 2025-03-04 | End: 2025-03-11

## 2025-03-04 RX ORDER — OXYCODONE HYDROCHLORIDE 5 MG/1
5 TABLET ORAL PRN
Status: DISCONTINUED | OUTPATIENT
Start: 2025-03-04 | End: 2025-03-04 | Stop reason: HOSPADM

## 2025-03-04 RX ORDER — CEFUROXIME AXETIL 250 MG/1
250 TABLET ORAL 2 TIMES DAILY
Qty: 14 TABLET | Refills: 0 | Status: SHIPPED | OUTPATIENT
Start: 2025-03-04 | End: 2025-03-04

## 2025-03-04 RX ORDER — SODIUM CHLORIDE 0.9 % (FLUSH) 0.9 %
5-40 SYRINGE (ML) INJECTION EVERY 12 HOURS SCHEDULED
Status: DISCONTINUED | OUTPATIENT
Start: 2025-03-04 | End: 2025-03-04 | Stop reason: HOSPADM

## 2025-03-04 RX ORDER — LIDOCAINE HYDROCHLORIDE 20 MG/ML
INJECTION, SOLUTION INFILTRATION; PERINEURAL
Status: DISCONTINUED | OUTPATIENT
Start: 2025-03-04 | End: 2025-03-04 | Stop reason: SDUPTHER

## 2025-03-04 RX ORDER — SODIUM CHLORIDE 0.9 % (FLUSH) 0.9 %
5-40 SYRINGE (ML) INJECTION PRN
Status: DISCONTINUED | OUTPATIENT
Start: 2025-03-04 | End: 2025-03-04 | Stop reason: HOSPADM

## 2025-03-04 RX ORDER — NALOXONE HYDROCHLORIDE 0.4 MG/ML
INJECTION, SOLUTION INTRAMUSCULAR; INTRAVENOUS; SUBCUTANEOUS PRN
Status: DISCONTINUED | OUTPATIENT
Start: 2025-03-04 | End: 2025-03-04 | Stop reason: HOSPADM

## 2025-03-04 RX ADMIN — PROPOFOL 100 MG: 10 INJECTION, EMULSION INTRAVENOUS at 10:16

## 2025-03-04 RX ADMIN — PROPOFOL 100 MG: 10 INJECTION, EMULSION INTRAVENOUS at 10:12

## 2025-03-04 RX ADMIN — SODIUM CHLORIDE, SODIUM LACTATE, POTASSIUM CHLORIDE, AND CALCIUM CHLORIDE: .6; .31; .03; .02 INJECTION, SOLUTION INTRAVENOUS at 09:03

## 2025-03-04 RX ADMIN — LIDOCAINE HYDROCHLORIDE 60 MG: 20 INJECTION, SOLUTION INFILTRATION; PERINEURAL at 10:06

## 2025-03-04 RX ADMIN — PROPOFOL 100 MG: 10 INJECTION, EMULSION INTRAVENOUS at 10:06

## 2025-03-04 RX ADMIN — Medication 20 MG: at 09:02

## 2025-03-04 RX ADMIN — SODIUM CHLORIDE, POTASSIUM CHLORIDE, SODIUM LACTATE AND CALCIUM CHLORIDE: 600; 310; 30; 20 INJECTION, SOLUTION INTRAVENOUS at 10:01

## 2025-03-04 ASSESSMENT — PAIN - FUNCTIONAL ASSESSMENT
PAIN_FUNCTIONAL_ASSESSMENT: 0-10
PAIN_FUNCTIONAL_ASSESSMENT: ACTIVITIES ARE NOT PREVENTED
PAIN_FUNCTIONAL_ASSESSMENT: 0-10

## 2025-03-04 NOTE — ANESTHESIA POSTPROCEDURE EVALUATION
Department of Anesthesiology  Postprocedure Note    Patient: Rosaura Viveros  MRN: [id number]  YOB: 1950  Date of evaluation: 3/4/2025    Procedure Summary       Date: 03/04/25 Room / Location: [address]    Anesthesia Start: 1001 Anesthesia Stop: 1028    Procedure: CYSTOSCOPY (Bladder) Diagnosis:       Microscopic hematuria      (Microscopic hematuria [R31.29])    Surgeons: Ladarius Cabrera MD Responsible Provider: Yusef Bethea MD    Anesthesia Type: general ASA Status: 3            Anesthesia Type: No value filed.    Riley Phase I: Riley Score: 10    Riley Phase II:      Anesthesia Post Evaluation    Comments: Postoperative Anesthesia Note    Name:    Rosaura Viveros  MRN:      [id number]    Patient Vitals in the past 12 hrs:  03/04/25 1033, BP:107/68, Pulse:70, Resp:12, SpO2:96 %  03/04/25 1027, BP:101/80, Temp:97.6 °F (36.4 °C), Temp src:Axillary, Pulse:72, Resp:14, SpO2:97 %  03/04/25 0837, BP:(!) 157/68, Temp:98.3 °F (36.8 °C), Temp src:Oral, Pulse:72, Resp:16, SpO2:97 %, Height:1.6 m (5' 3\"), Weight:75.8 kg (167 lb)     LABS:    CBC  Lab Results       Component                Value               Date/Time                  WBC                      7.2                 03/04/2025 08:55 AM        HGB                      10.0 (L)            03/04/2025 08:55 AM        HCT                      29.9 (L)            03/04/2025 08:55 AM        PLT                      257                 03/04/2025 08:55 AM   RENAL  Lab Results       Component                Value               Date/Time                  NA                       138                 03/04/2025 08:55 AM        K                        5.4 (H)             03/04/2025 08:55 AM        CL                       102                 03/04/2025 08:55 AM        CO2                      26                  03/04/2025 08:55 AM        BUN                      54 (H)              03/04/2025 08:55 AM        CREATININE

## 2025-03-04 NOTE — CONSULTS
2.0 (H) 01/12/2025    CREATININE 2.0 (H) 01/07/2025    CREATININE 2.5 (H) 01/06/2025     Lab Results   Component Value Date/Time    COLORU Yellow 01/12/2025 01:47 PM    NITRU Negative 01/12/2025 01:47 PM    GLUCOSEU 100 01/12/2025 01:47 PM    KETUA Negative 01/12/2025 01:47 PM    UROBILINOGEN 0.2 01/12/2025 01:47 PM    BILIRUBINUR Negative 01/12/2025 01:47 PM     Lab Results   Component Value Date    WBC 5.7 01/12/2025    HGB 9.6 (L) 01/12/2025    HCT 28.5 (L) 01/12/2025    MCV 86.7 01/12/2025     01/12/2025        Radiology: \"Imaging was independently reviewed by myself and I agree with the radiology interpretation except as noted above\"  Renal ultrasound as well as CAT scan  No obvious stones no no hydronephrosis    Impression/Plan:  Chronic kidney disease, creatinine baseline is around 2.0  Microscopic hematuria    This procedure has been fully reviewed with the patient and written informed consent has been obtained.      Ladarius Cabrera MD  Office: [phone]

## 2025-03-04 NOTE — OP NOTE
Operative Note      Patient: Rosaura Viveros  YOB: 1950  MRN: [id number]    Date of Procedure: 3/4/2025    Pre-Op Diagnosis Codes:      * Microscopic hematuria [R31.29]    Post-Operative Diagnosis: same     Procedure Performed: Cystourethroscopy      Surgeon:  Ladarius Cabrera MD    Anesthesia: Total Intravenous anesthesia  Specimens: None  Estimated Blood Loss: None  Complications: None      Indications: See office notes and ct and renal US neg,  ckd, cytol neg, microhematuria and cystitis. Here for cysto    Description of Procedure:   Informed consent was obtained prior to the procedure. The patient was positioned in dorsal lithotomy in the operating room. The genitalia was then prepped and draped in the usual sterile fashion.  A 22 Sami cystoscope was then inserted through the urethra up into the bladder. The bladder was examined systematically.  No mucosal lesions seen.  There was cloudy urine that we sent off for urine culture.  The patient tolerated the procedure without any complications.      Findings:   External genitalia was normal without lesions  Urethra: Within normal limits  Bladder: Mild cystitis changes, Mild trabeculation. No tumors, stones, or diverticula were identified. Bilateral ureteral orifices were normal.  No anterior vaginal lesions or cyst on pelvic exam; no obvious prolapse in the sedated state    Follow up:  -fu with PCP or one of the LIN 1-2 months to re-assess

## 2025-03-04 NOTE — H&P
H&P reviewed. The patient was examined and there are no changes to the H&P.     Hp from hospital notes, office notes, or printed at bedside.  See all documents including the scanned Documents.   These were reviewed with the patient and I examined the patient.  There was no change.  The surgical site was confirmed by the patient and me.             Plan: The risks, benefits, expected outcome, and alternative to the recommended procedure have been discussed with the patient. Patient understands and wants to proceed with the procedure.     All questions answered.

## 2025-03-04 NOTE — DISCHARGE INSTRUCTIONS
Great news!   No tumors.  There was possible infection in the bladder.  We ordered antibiotics for you.    Follow up with your PCP/regular doctor in 1-2 months and call us if something changes.     If you have any questions or issues arise  Please call my Nurse Apoorva Rebolledo - [phone]     Office: [phone]        Cystoscopy  The doctor will put a thin, lighted tool called a cystoscope, or scope, into your urethra. The urethra is the tube that carries urine from the bladder to the outside of the body. Then the doctor will gently thread the scope into your bladder. Your bladder will then be filled with fluid. This stretches the bladder so that your doctor can clearly see the inside of your bladder.     You may feel the need to urinate often for a while after the surgery. But this should improve with time. It may burn when you urinate. Drink lots of fluids to help with the burning. Your urine also may look pink for up to 2 to 3 days after surgery. This is because there may be blood in it.    You may have to avoid strenuous activity and heavy lifting for about 1-2 days after surgery.

## 2025-03-06 LAB
BACTERIA UR CULT: ABNORMAL
BACTERIA UR CULT: ABNORMAL
ORGANISM: ABNORMAL
ORGANISM: ABNORMAL

## 2025-05-18 ENCOUNTER — APPOINTMENT (OUTPATIENT)
Dept: GENERAL RADIOLOGY | Age: 75
End: 2025-05-18
Payer: MEDICARE

## 2025-05-18 ENCOUNTER — HOSPITAL ENCOUNTER (EMERGENCY)
Age: 75
Discharge: HOME | End: 2025-05-18
Attending: STUDENT IN AN ORGANIZED HEALTH CARE EDUCATION/TRAINING PROGRAM
Payer: MEDICARE

## 2025-05-18 ENCOUNTER — APPOINTMENT (OUTPATIENT)
Dept: CT IMAGING | Age: 75
End: 2025-05-18
Payer: MEDICARE

## 2025-05-18 VITALS
HEART RATE: 65 BPM | DIASTOLIC BLOOD PRESSURE: 74 MMHG | WEIGHT: 180 LBS | RESPIRATION RATE: 18 BRPM | BODY MASS INDEX: 31.89 KG/M2 | TEMPERATURE: 96.5 F | OXYGEN SATURATION: 99 % | SYSTOLIC BLOOD PRESSURE: 162 MMHG | HEIGHT: 63 IN

## 2025-05-18 DIAGNOSIS — N30.00 ACUTE CYSTITIS WITHOUT HEMATURIA: Primary | ICD-10-CM

## 2025-05-18 DIAGNOSIS — Z86.59 HISTORY OF DEMENTIA: ICD-10-CM

## 2025-05-18 LAB
ALBUMIN SERPL-MCNC: 3.5 G/DL (ref 3.4–5)
ALBUMIN/GLOB SERPL: 1.4 {RATIO} (ref 1.1–2.2)
ALP SERPL-CCNC: 69 U/L (ref 40–129)
ALT SERPL-CCNC: 16 U/L (ref 10–40)
ANION GAP SERPL CALCULATED.3IONS-SCNC: 12 MMOL/L (ref 3–16)
AST SERPL-CCNC: 25 U/L (ref 15–37)
BACTERIA URNS QL MICRO: ABNORMAL /HPF
BASOPHILS # BLD: 0 K/UL (ref 0–0.2)
BASOPHILS NFR BLD: 0.7 %
BILIRUB SERPL-MCNC: <0.2 MG/DL (ref 0–1)
BILIRUB UR QL STRIP.AUTO: NEGATIVE
BUN SERPL-MCNC: 32 MG/DL (ref 7–20)
CALCIUM SERPL-MCNC: 8.3 MG/DL (ref 8.3–10.6)
CHLORIDE SERPL-SCNC: 106 MMOL/L (ref 99–110)
CLARITY UR: ABNORMAL
CO2 SERPL-SCNC: 21 MMOL/L (ref 21–32)
COLOR UR: YELLOW
CREAT SERPL-MCNC: 1.6 MG/DL (ref 0.6–1.2)
DEPRECATED RDW RBC AUTO: 14.3 % (ref 12.4–15.4)
EKG ATRIAL RATE: 60 BPM
EKG DIAGNOSIS: NORMAL
EKG P-R INTERVAL: 260 MS
EKG Q-T INTERVAL: 438 MS
EKG QRS DURATION: 70 MS
EKG QTC CALCULATION (BAZETT): 438 MS
EKG R AXIS: 4 DEGREES
EKG T AXIS: 44 DEGREES
EKG VENTRICULAR RATE: 60 BPM
EOSINOPHIL # BLD: 0.1 K/UL (ref 0–0.6)
EOSINOPHIL NFR BLD: 2.3 %
EPI CELLS #/AREA URNS HPF: ABNORMAL /HPF (ref 0–5)
GFR SERPLBLD CREATININE-BSD FMLA CKD-EPI: 33 ML/MIN/{1.73_M2}
GLUCOSE SERPL-MCNC: 190 MG/DL (ref 70–99)
GLUCOSE UR STRIP.AUTO-MCNC: 100 MG/DL
HCT VFR BLD AUTO: 32.7 % (ref 36–48)
HGB BLD-MCNC: 10.2 G/DL (ref 12–16)
HGB UR QL STRIP.AUTO: NEGATIVE
HYALINE CASTS #/AREA URNS LPF: ABNORMAL /LPF (ref 0–2)
KETONES UR STRIP.AUTO-MCNC: NEGATIVE MG/DL
LEUKOCYTE ESTERASE UR QL STRIP.AUTO: ABNORMAL
LYMPHOCYTES # BLD: 1 K/UL (ref 1–5.1)
LYMPHOCYTES NFR BLD: 16 %
MCH RBC QN AUTO: 28.4 PG (ref 26–34)
MCHC RBC AUTO-ENTMCNC: 31.3 G/DL (ref 31–36)
MCV RBC AUTO: 90.9 FL (ref 80–100)
MONOCYTES # BLD: 0.3 K/UL (ref 0–1.3)
MONOCYTES NFR BLD: 5.3 %
MUCOUS THREADS #/AREA URNS LPF: ABNORMAL /LPF
NEUTROPHILS # BLD: 4.9 K/UL (ref 1.7–7.7)
NEUTROPHILS NFR BLD: 75.7 %
NITRITE UR QL STRIP.AUTO: POSITIVE
PH UR STRIP.AUTO: 7.5 [PH] (ref 5–8)
PLATELET # BLD AUTO: 245 K/UL (ref 135–450)
PMV BLD AUTO: 8.2 FL (ref 5–10.5)
POTASSIUM SERPL-SCNC: 4.8 MMOL/L (ref 3.5–5.1)
POTASSIUM SERPL-SCNC: 5.6 MMOL/L (ref 3.5–5.1)
PROT SERPL-MCNC: 6 G/DL (ref 6.4–8.2)
PROT UR STRIP.AUTO-MCNC: >=300 MG/DL
RBC # BLD AUTO: 3.59 M/UL (ref 4–5.2)
RBC #/AREA URNS HPF: ABNORMAL /HPF (ref 0–4)
SODIUM SERPL-SCNC: 139 MMOL/L (ref 136–145)
SP GR UR STRIP.AUTO: 1.02 (ref 1–1.03)
TROPONIN, HIGH SENSITIVITY: 26 NG/L (ref 0–14)
TROPONIN, HIGH SENSITIVITY: 27 NG/L (ref 0–14)
UA DIPSTICK W REFLEX MICRO PNL UR: YES
URN SPEC COLLECT METH UR: ABNORMAL
UROBILINOGEN UR STRIP-ACNC: 0.2 E.U./DL
WBC # BLD AUTO: 6.4 K/UL (ref 4–11)
WBC #/AREA URNS HPF: ABNORMAL /HPF (ref 0–5)

## 2025-05-18 PROCEDURE — 84132 ASSAY OF SERUM POTASSIUM: CPT

## 2025-05-18 PROCEDURE — 87186 SC STD MICRODIL/AGAR DIL: CPT

## 2025-05-18 PROCEDURE — 99285 EMERGENCY DEPT VISIT HI MDM: CPT

## 2025-05-18 PROCEDURE — 71045 X-RAY EXAM CHEST 1 VIEW: CPT

## 2025-05-18 PROCEDURE — 93010 ELECTROCARDIOGRAM REPORT: CPT | Performed by: INTERNAL MEDICINE

## 2025-05-18 PROCEDURE — 84484 ASSAY OF TROPONIN QUANT: CPT

## 2025-05-18 PROCEDURE — 81001 URINALYSIS AUTO W/SCOPE: CPT

## 2025-05-18 PROCEDURE — 87077 CULTURE AEROBIC IDENTIFY: CPT

## 2025-05-18 PROCEDURE — 85025 COMPLETE CBC W/AUTO DIFF WBC: CPT

## 2025-05-18 PROCEDURE — 36415 COLL VENOUS BLD VENIPUNCTURE: CPT

## 2025-05-18 PROCEDURE — 80053 COMPREHEN METABOLIC PANEL: CPT

## 2025-05-18 PROCEDURE — 93005 ELECTROCARDIOGRAM TRACING: CPT | Performed by: STUDENT IN AN ORGANIZED HEALTH CARE EDUCATION/TRAINING PROGRAM

## 2025-05-18 PROCEDURE — 70450 CT HEAD/BRAIN W/O DYE: CPT

## 2025-05-18 PROCEDURE — 6370000000 HC RX 637 (ALT 250 FOR IP): Performed by: STUDENT IN AN ORGANIZED HEALTH CARE EDUCATION/TRAINING PROGRAM

## 2025-05-18 PROCEDURE — 87086 URINE CULTURE/COLONY COUNT: CPT

## 2025-05-18 RX ORDER — LACOSAMIDE 100 MG/1
100 TABLET ORAL 2 TIMES DAILY
COMMUNITY

## 2025-05-18 RX ORDER — FERROUS SULFATE 325(65) MG
325 TABLET ORAL
COMMUNITY

## 2025-05-18 RX ORDER — CEPHALEXIN 500 MG/1
500 CAPSULE ORAL 2 TIMES DAILY
Qty: 10 CAPSULE | Refills: 0 | Status: SHIPPED | OUTPATIENT
Start: 2025-05-18 | End: 2025-05-23

## 2025-05-18 RX ORDER — INSULIN GLARGINE 100 [IU]/ML
10 INJECTION, SOLUTION SUBCUTANEOUS NIGHTLY
COMMUNITY

## 2025-05-18 RX ORDER — CEPHALEXIN 500 MG/1
500 CAPSULE ORAL 2 TIMES DAILY
Qty: 10 CAPSULE | Refills: 0 | Status: SHIPPED | OUTPATIENT
Start: 2025-05-18 | End: 2025-05-18

## 2025-05-18 RX ORDER — CEPHALEXIN 500 MG/1
500 CAPSULE ORAL ONCE
Status: COMPLETED | OUTPATIENT
Start: 2025-05-18 | End: 2025-05-18

## 2025-05-18 RX ADMIN — CEPHALEXIN 500 MG: 500 CAPSULE ORAL at 15:32

## 2025-05-18 ASSESSMENT — PAIN SCALES - GENERAL: PAINLEVEL_OUTOF10: 8

## 2025-05-18 ASSESSMENT — PAIN DESCRIPTION - LOCATION: LOCATION: FOOT

## 2025-05-18 ASSESSMENT — PAIN DESCRIPTION - ORIENTATION: ORIENTATION: LEFT

## 2025-05-18 ASSESSMENT — PAIN - FUNCTIONAL ASSESSMENT: PAIN_FUNCTIONAL_ASSESSMENT: 0-10

## 2025-05-18 NOTE — ED PROVIDER NOTES
[facility] EMERGENCY DEPARTMENT     EMERGENCY DEPARTMENT ENCOUNTER            Pt Name: Rosaura Viveros   MRN: [id number]   Birthdate 1950   Date of evaluation: 5/18/2025   Provider: Tessa Huang MD   PCP: John Rose MD   Note Started: 11:57 AM EDT 5/18/25          CHIEF COMPLAINT     Chief Complaint   Patient presents with    Fatigue      HISTORY OF PRESENT ILLNESS:   History from : Patient and EMS   Limitations to history : dementia      Rosaura Viveros is a 75 y.o. female who presents via EMS from SNF with concerns for altered mental status.  Patient has a history of Lewy body dementia.  Patient reportedly was acting what is normal for her this morning but they feel that her mental status is changed.  Upon arrival, was initially complaining of left leg pain.  Denies any other complaints or concerns.  Oriented only to self.    Nursing Notes were all reviewed and agreed with, or any disagreements were addressed in the HPI.     REVIEW OF SYSTEMS :    Positives and Pertinent negatives as per HPI.      MEDICAL HISTORY   has a past medical history of Amenorrhea, Anxiety, Carpal tunnel syndrome of right wrist (07/16/2013), CHF (congestive heart failure) (HCC), Dementia with Lewy bodies (HCC), Depression, Epilepsy (HCC), Fibromyalgia, Gastritis, History of stroke, HTN (hypertension), Hypertension, Migraines, Narcolepsy without cataplexy(347.00), Overweight(278.02), Paroxysmal atrial fibrillation (HCC), Restless legs syndrome, TIA (transient ischemic attack), and Type II or unspecified type diabetes mellitus without mention of complication, not stated as uncontrolled.    Past Surgical History:   Procedure Laterality Date    CHOLECYSTECTOMY  01/01/1979    CYSTOSCOPY  03/04/2025    CYSTOSCOPY N/A 3/4/2025    CYSTOSCOPY [facility] by Ladarius Cabrera MD [facility]      CURRENTMEDICATIONS       Current Discharge Medication List        CONTINUE these medications which have NOT CHANGED    Details

## 2025-05-20 LAB
BACTERIA UR CULT: ABNORMAL
ORGANISM: ABNORMAL

## 2025-06-15 ENCOUNTER — HOSPITAL ENCOUNTER (EMERGENCY)
Age: 75
Discharge: NURSING, REHAB OR LONG-TERM CARE | End: 2025-06-15
Attending: EMERGENCY MEDICINE
Payer: MEDICARE

## 2025-06-15 VITALS
TEMPERATURE: 98 F | OXYGEN SATURATION: 93 % | DIASTOLIC BLOOD PRESSURE: 67 MMHG | RESPIRATION RATE: 16 BRPM | SYSTOLIC BLOOD PRESSURE: 160 MMHG | HEART RATE: 82 BPM

## 2025-06-15 DIAGNOSIS — T82.838A BLEEDING FROM PERIPHERALLY INSERTED CENTRAL CATHETER (PICC), INITIAL ENCOUNTER: Primary | ICD-10-CM

## 2025-06-15 PROCEDURE — 99283 EMERGENCY DEPT VISIT LOW MDM: CPT

## 2025-06-15 ASSESSMENT — PAIN - FUNCTIONAL ASSESSMENT: PAIN_FUNCTIONAL_ASSESSMENT: NONE - DENIES PAIN

## 2025-06-15 NOTE — DISCHARGE INSTRUCTIONS
Your PICC line became dislodged.  You are not bleeding from the site.  We placed a peripheral IV so you can continue your antibiotics.  You will need to have a new PICC line placed at your nursing home tomorrow.

## 2025-06-15 NOTE — ED PROVIDER NOTES
Spouse name: Not on file    Number of children: Not on file    Years of education: Not on file    Highest education level: Not on file   Occupational History    Not on file   Tobacco Use    Smoking status: Never    Smokeless tobacco: Never   Substance and Sexual Activity    Alcohol use: No    Drug use: Never    Sexual activity: Not on file   Other Topics Concern    Not on file   Social History Narrative    Not on file     Social Drivers of Health     Financial Resource Strain: Low Risk  (1/30/2022)    [facility]    Overall Financial Resource Strain (CARDIA)     Difficulty of Paying Living Expenses: Not hard at all   Food Insecurity: No Food Insecurity (1/1/2025)    Hunger Vital Sign     Worried About Running Out of Food in the Last Year: Never true     Ran Out of Food in the Last Year: Never true   Transportation Needs: No Transportation Needs (1/1/2025)    PRAPARE - Transportation     Lack of Transportation (Medical): No     Lack of Transportation (Non-Medical): No   Physical Activity: Not on file   Stress: Not on file   Social Connections: Not on file   Intimate Partner Violence: Not on file   Housing Stability: Low Risk  (1/1/2025)    Housing Stability Vital Sign     Unable to Pay for Housing in the Last Year: No     Number of Times Moved in the Last Year: 1     Homeless in the Last Year: No     No current facility-administered medications for this encounter.     Current Outpatient Medications   Medication Sig Dispense Refill    insulin glargine (BASAGLAR KWIKPEN) 100 UNIT/ML injection pen Inject 10 Units into the skin nightly      lacosamide (VIMPAT) 100 MG TABS tablet Take 1 tablet by mouth 2 times daily. Max Daily Amount: 200 mg      ferrous sulfate (IRON 325) 325 (65 Fe) MG tablet Take 1 tablet by mouth daily (with breakfast)      losartan (COZAAR) 100 MG tablet Take 1 tablet by mouth daily      acetaminophen (TYLENOL) 325 MG tablet Take 2 tablets by

## 2025-06-15 NOTE — ED NOTES
Report given to transport team at bedside, patient alert and confused per baseline.     Belongings sent with transport team taking patient back to [facility]. Patient sent with peripheral IV per provider order and charge nurse instructions.     Writer trying to call [facility] to give report

## (undated) DEVICE — SOLUTION IRRIGATION STRL H2O 1000 ML UROMATIC CONTAINER

## (undated) DEVICE — GLOVE ORANGE PI 7   MSG9070

## (undated) DEVICE — CANNULA NSL 13FT TUBE AD ETCO2 DIV SAMP M

## (undated) DEVICE — MHCZ CYSTO: Brand: MEDLINE INDUSTRIES, INC.